# Patient Record
Sex: FEMALE | Race: WHITE | NOT HISPANIC OR LATINO | Employment: FULL TIME | URBAN - METROPOLITAN AREA
[De-identification: names, ages, dates, MRNs, and addresses within clinical notes are randomized per-mention and may not be internally consistent; named-entity substitution may affect disease eponyms.]

---

## 2017-03-29 ENCOUNTER — RADIANT APPOINTMENT (OUTPATIENT)
Dept: MAMMOGRAPHY | Facility: CLINIC | Age: 47
End: 2017-03-29
Attending: NURSE PRACTITIONER
Payer: COMMERCIAL

## 2017-03-29 DIAGNOSIS — Z12.31 VISIT FOR SCREENING MAMMOGRAM: ICD-10-CM

## 2017-03-29 PROCEDURE — G0202 SCR MAMMO BI INCL CAD: HCPCS | Performed by: RADIOLOGY

## 2017-04-14 ENCOUNTER — OFFICE VISIT (OUTPATIENT)
Dept: URGENT CARE | Facility: URGENT CARE | Age: 47
End: 2017-04-14
Payer: COMMERCIAL

## 2017-04-14 ENCOUNTER — TELEPHONE (OUTPATIENT)
Dept: FAMILY MEDICINE | Facility: CLINIC | Age: 47
End: 2017-04-14

## 2017-04-14 VITALS
TEMPERATURE: 98.2 F | HEART RATE: 77 BPM | BODY MASS INDEX: 20.52 KG/M2 | DIASTOLIC BLOOD PRESSURE: 78 MMHG | WEIGHT: 132 LBS | OXYGEN SATURATION: 99 % | SYSTOLIC BLOOD PRESSURE: 114 MMHG | RESPIRATION RATE: 15 BRPM

## 2017-04-14 DIAGNOSIS — T14.8XXA BRUISING: ICD-10-CM

## 2017-04-14 DIAGNOSIS — J30.2 SEASONAL ALLERGIC RHINITIS, UNSPECIFIED ALLERGIC RHINITIS TRIGGER: ICD-10-CM

## 2017-04-14 DIAGNOSIS — J45.31 MILD PERSISTENT ASTHMA WITH EXACERBATION: ICD-10-CM

## 2017-04-14 DIAGNOSIS — R42 VERTIGO: Primary | ICD-10-CM

## 2017-04-14 LAB
ERYTHROCYTE [DISTWIDTH] IN BLOOD BY AUTOMATED COUNT: 12.2 % (ref 10–15)
HCT VFR BLD AUTO: 40.1 % (ref 35–47)
HGB BLD-MCNC: 13.1 G/DL (ref 11.7–15.7)
MCH RBC QN AUTO: 30.6 PG (ref 26.5–33)
MCHC RBC AUTO-ENTMCNC: 32.7 G/DL (ref 31.5–36.5)
MCV RBC AUTO: 94 FL (ref 78–100)
PLATELET # BLD AUTO: 209 10E9/L (ref 150–450)
RBC # BLD AUTO: 4.28 10E12/L (ref 3.8–5.2)
WBC # BLD AUTO: 5.5 10E9/L (ref 4–11)

## 2017-04-14 PROCEDURE — 99214 OFFICE O/P EST MOD 30 MIN: CPT | Performed by: FAMILY MEDICINE

## 2017-04-14 PROCEDURE — 36415 COLL VENOUS BLD VENIPUNCTURE: CPT | Performed by: FAMILY MEDICINE

## 2017-04-14 PROCEDURE — 85027 COMPLETE CBC AUTOMATED: CPT | Performed by: FAMILY MEDICINE

## 2017-04-14 RX ORDER — FLUTICASONE PROPIONATE 50 MCG
SPRAY, SUSPENSION (ML) NASAL
Qty: 9.9 ML | Refills: 1 | Status: SHIPPED | OUTPATIENT
Start: 2017-04-14 | End: 2017-07-18

## 2017-04-14 RX ORDER — ALBUTEROL SULFATE 90 UG/1
2 AEROSOL, METERED RESPIRATORY (INHALATION) EVERY 4 HOURS PRN
Qty: 1 INHALER | Refills: 2 | Status: SHIPPED | OUTPATIENT
Start: 2017-04-14 | End: 2018-09-28

## 2017-04-14 ASSESSMENT — PAIN SCALES - GENERAL: PAINLEVEL: MILD PAIN (3)

## 2017-04-14 NOTE — TELEPHONE ENCOUNTER
RN returned call to pt.    Pt reports a family hx of migraines. Pt states her first migraine was 15-years-ago. Previously pt headaches were only associated with allergy symptom congestion. Pt states recently over the past few months, headaches have become more frequent and no longer respond to tx with Advil. Pt states she is unsure if increasing HAs are related to stress, tension, or other. Pt reports having a headache earlier today, but did Yoga to stretch out and it helped.     Pt reports hx of dizziness/vertigo with current episode lasting 6 days without complete resolution. First episode about 4-5 years ago. Hx of seizures as a child at age 1, without additional hx of this since early childhood. Pt reports hx of low BP and states she gets dizziness when dehydrated due to low BP. Pt states she is not dehydrated at this time and states the dizziness has been persistent and not only associated with position changes. Pt states last week on Thursday and Friday she developed a headache, followed by improvement on Saturday. Sunday-Wednesday of this week significant, persistent dizziness developed especially with position changes. The vertigo Sunday-Wednesday was associated with nausea with any change in position. Pt took dramamine for vertigo sx. Pt states mild dizziness persists today, but is able to eat and walk without nausea now today.    Pt reports about 1 week ago she noticed blood underneath skin on wrists. Pt reports sudden onset without injury. Pt felt pinching sensation in wrist and at that time could see active bleeding below the skin in her wrist without a break in the skin. Pt states affected area began about 1.5 inches above the wrist in the arm, and extended to the hand near thumb. Pt states the visible blood below the skin was associated with swelling locally and pain associated with the swelling in the region. Pt states the bleeding stopped without additional intervention, and bruising remained visible  in an area about 3 inches in area. Pt has had no additional episodes of spontaneous or unusual bleeding episodes since that time.    Per Telephone Triage Protocols for Nurses, Fifth Edition, Millicent Parson/Agnieszka, pt advised to be seen for evaluation for vertigo sx persisting beyond 3 days. Advised it would be appropriate to discuss the spontaneous bleeding episode and questions regarding peripheral circulation at that time as well. Advised pt schedule next available appointment with PCP as well to discuss the increasing frequency and severity of headaches no longer responding to OTC tx.   Pt states she will come to urgent care tonight regarding vertigo and bleeding, and next available appointment was scheduled on 4/26/17 with PCP. The patient/parent agrees with the plan and verbalized good understanding.  Note above reviewed with Dr. Vega and provider agrees with this plan.    Shauna Worley RN

## 2017-04-14 NOTE — MR AVS SNAPSHOT
After Visit Summary   4/14/2017    An Cornejo    MRN: [id number]           Patient Information     Date Of Birth          1970        Visit Information        Provider Department      4/14/2017 6:10 PM Ayan Nava MD [facility]        Today's Diagnoses     Vertigo    -  1    Bruising        Seasonal allergic rhinitis, unspecified allergic rhinitis trigger        Mild persistent asthma with exacerbation          Care Instructions      Benign Positional Vertigo    The inner ear is located behind the middle ear. It is a part of the balance center of the body. It contains small calcium particles within fluid filled canals (semi-circular canals). These particles can move out of position as a result of aging, head trauma or disease of the inner ear. Once that happens, movement of the head into certain positions may cause the particles to stimulate the inner ear and create the feeling of vertigo.  Vertigo is a false feeling of motion (as if you or the room is spinning). A vertigo attack may cause sudden nausea, vomiting and heavy sweating. Severe vertigo causes a loss of balance and may result in falling. During an attack of vertigo, head movement and body position changes will worsen symptoms.  An episode of vertigo may last seconds, minutes or hours. Once you are over the first episode of vertigo, it may never return. Sometimes symptoms recur off and on over several weeks or longer.  Home Care:    If symptoms are severe, rest quietly in bed. Change positions slowly. There is usually one position that will feel best, such as lying on one side or lying on your back with your head slightly raised on pillows.    Do not drive or work with dangerous machinery for one week after symptoms disappear, in case of a sudden return of symptoms.    Take medicine as prescribed to relieve your symptoms. Unless another medicine was prescribed for nausea, vomiting and vertigo, you may use  over-the-counter motion sickness pills, such as meclizine (Bonine, Bonamine, Antivert) or dimenhydrinate (Dramamine).  Follow Up  with your doctor or as directed by our staff. Report any persistent ringing in the ear or hearing loss to your doctor.  [NOTE: If you had a CT or MRI scan, it will be reviewed by a specialist. You will be notified of any new findings that may affect your care.]  Get Prompt Medical Attention  if any of the following occur:    Worsening of vertigo not controlled by the medicine prescribed    Repeated vomiting not controlled by the medicine prescribed    Increased weakness or fainting    Severe headache or unusual drowsiness or confusion    Weakness of an arm or leg or one side of the face    Difficulty with speech or vision    Seizure    9878-7687 [employer]. [address] [place] [address]. All rights reserved. This information is not intended as a substitute for professional medical care. Always follow your healthcare professional's instructions.        Labyrinthitis    The inner ear is located behind the middle ear. It is part of the balance center of your body. When the inner ear becomes irritated or inflamed it causes a condition known as labyrinthitis. It may due to a viral infection, but often a cause is not found. Labyrinthitis causes sudden dizziness and balance problems. It often causes a feeling that you or the room is spinning (vertigo). You may feel nauseated or throw up. You may also feel a loss of balance when trying to walk. Head movement from side to side or changes in body position (from lying to sitting or standing) may worsen symptoms. You may have ringing in the ear. Hearing may also be affected.  An episode of labyrinthitis may last seconds, minutes or hours. It may never return. Or symptoms may recur off and on over several weeks or longer. In many cases, the problem is short-term and goes away when the inner ear issue resolves.  Home  care    Take medicine as prescribed to relieve your symptoms. Unless another medicine was prescribed, you can try over-the-counter motion sickness pills. Note that these medicines may cause drowsiness.    If symptoms are severe, rest quietly in bed. Change positions slowly. There may be one position feels best, such as lying on one side or lying on your back with your head slightly raised on pillows.    Vestibular rehabilitation exercises involve moving the head to help resolve problems in the inner ear. If these exercises have been prescribed, do them as you have been instructed.    Do not drive or work with dangerous machinery until one week has passed without symptoms. Be cautious about using stairs.  Follow-up care  Follow up with your healthcare provider or as advised by our staff.  When to seek medical advice  Call your healthcare provider for any of the following:    Symptoms that are not controlled by medicine or that get worse    Repeated vomiting that is not relieved by medicine    Increased weakness or fainting    Headache or unusual drowsiness    Difficulty with vision or speech    Trouble moving the face, arms or legs    Hearing loss    Symptoms persist beyond a few days    1226-8067 [employer]. [address] [place] [address]. All rights reserved. This information is not intended as a substitute for professional medical care. Always follow your healthcare professional's instructions.              Follow-ups after your visit        Additional Services     PHYSICAL THERAPY REFERRAL       *This therapy referral will be filtered to a centralized scheduling office at [facility] and the patient will receive a call to schedule an appointment at a [place] location most convenient for them. *     [facility] provides Physical Therapy evaluation and treatment and many specialty services across the [place] system.  If requesting a specialty  program, please choose from the list below.    If you have not heard from the scheduling office within 2 business days, please call [phone] for all locations, with the exception of [place], please call [phone].  Treatment: Evaluation & Treatment  Special Instructions/Modalities: none   Special Programs: Balance/Vestibular    Please be aware that coverage of these services is subject to the terms and limitations of your health insurance plan.  Call member services at your health plan with any benefit or coverage questions.      **Note to Provider:  If you are referring outside of [facility] for the therapy appointment, please list the name of the location in the  special instructions  above, print the referral and give to the patient to schedule the appointment.                  Your next 10 appointments already scheduled     Apr 26, 2017  8:35 AM CDT   SHORT with Melania Vega MD   [facility] ([facility])    [address] [facility] [address] [phone]              Who to contact     If you have questions or need follow up information about today's clinic visit or your schedule please contact [facility] directly at [phone].  Normal or non-critical lab and imaging results will be communicated to you by MyChart, letter or phone within 4 business days after the clinic has received the results. If you do not hear from us within 7 days, please contact the clinic through [employer] or phone. If you have a critical or abnormal lab result, we will notify you by phone as soon as possible.  Submit refill requests through [employer] or call your pharmacy and they will forward the refill request to us. Please allow 3 business days for your refill to be completed.          Additional Information About Your Visit        [employer] Information     [employer] gives you secure access to your electronic health record. If you see a primary care provider, you can also send  messages to your care team and make appointments. If you have questions, please call your primary care clinic.  If you do not have a primary care provider, please call [phone] and they will assist you.        Care EveryWhere ID     This is your Care EveryWhere ID. This could be used by other organizations to access your [facility] medical records  [id number]        Your Vitals Were     Pulse Temperature Respirations Pulse Oximetry Breastfeeding? BMI (Body Mass Index)    77 98.2  F (36.8  C) (Oral) 15 99% No 20.52 kg/m2       Blood Pressure from Last 3 Encounters:   04/14/17 114/78   11/28/16 117/80   10/31/16 121/79    Weight from Last 3 Encounters:   04/14/17 132 lb (59.9 kg)   11/28/16 134 lb (60.8 kg)   10/31/16 136 lb 9.6 oz (62 kg)              We Performed the Following     CBC with platelets     PHYSICAL THERAPY REFERRAL          Where to get your medicines      These medications were sent to [facility]/pharmacy #[id number] - [place] [address] [facility],  AT [address], [facility] [address]     Phone:  [phone]     albuterol 108 (90 BASE) MCG/ACT Inhaler    fluticasone 50 MCG/ACT spray          Primary Care Provider Office Phone # Fax #    Melania Vega [phone]       [facility] [address] [facility] [address]        Thank you!     Thank you for choosing [facility]  for your care. Our goal is always to provide you with excellent care. Hearing back from our patients is one way we can continue to improve our services. Please take a few minutes to complete the written survey that you may receive in the mail after your visit with us. Thank you!             Your Updated Medication List - Protect others around you: Learn how to safely use, store and throw away your medicines at www.disposemymeds.org.          This list is accurate as of: 4/14/17  6:56 PM.  Always use your most recent med list.                    Brand Name Dispense Instructions for use    albuterol 108 (90 BASE) MCG/ACT Inhaler    PROAIR HFA/PROVENTIL HFA/VENTOLIN HFA    1 Inhaler    Inhale 2 puffs into the lungs every 4 hours as needed for shortness of breath / dyspnea       ALPRAZolam 0.25 MG tablet    XANAX    30 tablet    Take 1 tablet (0.25 mg) by mouth 3 times daily as needed for sleep or anxiety       cyclobenzaprine 10 MG tablet    FLEXERIL    14 tablet    Take 1 tablet (10 mg) by mouth nightly as needed for muscle spasms       dicyclomine 20 MG tablet    BENTYL    40 tablet    Take 1 tablet (20 mg) by mouth 4 times daily as needed       fluticasone 50 MCG/ACT spray    FLONASE    9.9 mL    2 sprays each nostril twice daily, for 1 week, then 2 sprays each nostril 1 time daily       MIRENA (52 MG) 20 MCG/24HR IUD   Generic drug:  levonorgestrel      1 each by Intrauterine route once Reported on 4/14/2017       senna 8.6 MG tablet    SENOKOT    60 tablet    Take 1 tablet by mouth daily

## 2017-04-14 NOTE — PATIENT INSTRUCTIONS
Benign Positional Vertigo    The inner ear is located behind the middle ear. It is a part of the balance center of the body. It contains small calcium particles within fluid filled canals (semi-circular canals). These particles can move out of position as a result of aging, head trauma or disease of the inner ear. Once that happens, movement of the head into certain positions may cause the particles to stimulate the inner ear and create the feeling of vertigo.  Vertigo is a false feeling of motion (as if you or the room is spinning). A vertigo attack may cause sudden nausea, vomiting and heavy sweating. Severe vertigo causes a loss of balance and may result in falling. During an attack of vertigo, head movement and body position changes will worsen symptoms.  An episode of vertigo may last seconds, minutes or hours. Once you are over the first episode of vertigo, it may never return. Sometimes symptoms recur off and on over several weeks or longer.  Home Care:    If symptoms are severe, rest quietly in bed. Change positions slowly. There is usually one position that will feel best, such as lying on one side or lying on your back with your head slightly raised on pillows.    Do not drive or work with dangerous machinery for one week after symptoms disappear, in case of a sudden return of symptoms.    Take medicine as prescribed to relieve your symptoms. Unless another medicine was prescribed for nausea, vomiting and vertigo, you may use over-the-counter motion sickness pills, such as meclizine (Bonine, Bonamine, Antivert) or dimenhydrinate (Dramamine).  Follow Up  with your doctor or as directed by our staff. Report any persistent ringing in the ear or hearing loss to your doctor.  [NOTE: If you had a CT or MRI scan, it will be reviewed by a specialist. You will be notified of any new findings that may affect your care.]  Get Prompt Medical Attention  if any of the following occur:    Worsening of vertigo not  controlled by the medicine prescribed    Repeated vomiting not controlled by the medicine prescribed    Increased weakness or fainting    Severe headache or unusual drowsiness or confusion    Weakness of an arm or leg or one side of the face    Difficulty with speech or vision    Seizure    0283-0154 [employer]. [address] [place] [address]. All rights reserved. This information is not intended as a substitute for professional medical care. Always follow your healthcare professional's instructions.        Labyrinthitis    The inner ear is located behind the middle ear. It is part of the balance center of your body. When the inner ear becomes irritated or inflamed it causes a condition known as labyrinthitis. It may due to a viral infection, but often a cause is not found. Labyrinthitis causes sudden dizziness and balance problems. It often causes a feeling that you or the room is spinning (vertigo). You may feel nauseated or throw up. You may also feel a loss of balance when trying to walk. Head movement from side to side or changes in body position (from lying to sitting or standing) may worsen symptoms. You may have ringing in the ear. Hearing may also be affected.  An episode of labyrinthitis may last seconds, minutes or hours. It may never return. Or symptoms may recur off and on over several weeks or longer. In many cases, the problem is short-term and goes away when the inner ear issue resolves.  Home care    Take medicine as prescribed to relieve your symptoms. Unless another medicine was prescribed, you can try over-the-counter motion sickness pills. Note that these medicines may cause drowsiness.    If symptoms are severe, rest quietly in bed. Change positions slowly. There may be one position feels best, such as lying on one side or lying on your back with your head slightly raised on pillows.    Vestibular rehabilitation exercises involve moving the head to help resolve  problems in the inner ear. If these exercises have been prescribed, do them as you have been instructed.    Do not drive or work with dangerous machinery until one week has passed without symptoms. Be cautious about using stairs.  Follow-up care  Follow up with your healthcare provider or as advised by our staff.  When to seek medical advice  Call your healthcare provider for any of the following:    Symptoms that are not controlled by medicine or that get worse    Repeated vomiting that is not relieved by medicine    Increased weakness or fainting    Headache or unusual drowsiness    Difficulty with vision or speech    Trouble moving the face, arms or legs    Hearing loss    Symptoms persist beyond a few days    1715-6951 [employer]. [address] [place] [address], [place] [address]. All rights reserved. This information is not intended as a substitute for professional medical care. Always follow your healthcare professional's instructions.

## 2017-04-14 NOTE — LETTER
[facility]  [address] [facility] [address]        April 17, 2017    An Nameny  [address] [facility] [address]            Dear An,    The results of your recent tests were normal.  Below is a copy of the results.  It was a pleasure to see you at your last appointment.    If you have any questions or concerns, please call myself or my nurse at [phone].    Sincerely,    Ayan Nava MD/ks    Results for orders placed or performed in visit on 04/14/17   CBC with platelets   Result Value Ref Range    WBC 5.5 4.0 - 11.0 10e9/L    RBC Count 4.28 3.8 - 5.2 10e12/L    Hemoglobin 13.1 11.7 - 15.7 g/dL    Hematocrit 40.1 35.0 - 47.0 %    MCV 94 78 - 100 fl    MCH 30.6 26.5 - 33.0 pg    MCHC 32.7 31.5 - 36.5 g/dL    RDW 12.2 10.0 - 15.0 %    Platelet Count 209 150 - 450 10e9/L

## 2017-04-14 NOTE — TELEPHONE ENCOUNTER
blood underneath skin on wrists, no trauma. Bruised for a few days. Went away.  Headaches and migraines for a few months. Dizzy spells, vertigo for 3 days. Patient would like a call back to discuss and if should be seen.   [phone]  Okay to leave detailed VM.

## 2017-04-14 NOTE — PROGRESS NOTES
SUBJECTIVE:                                                    An Cornejo is a 46 year old female who presents to clinic today for the following health issues:      Dizziness      Duration: 5 day    Description   Feeling faint:  no   Feeling like the surroundings are moving: YES  Loss of consciousness or falls: no     Intensity:  moderate    Accompanying signs and symptoms: patient recently c/o nasal congestion, post nasal drainage, headache and sinus pressure  Nausea/vomitting: YES  Palpitations: no   Weakness in arms or legs: no but off and on numbness in upper arms  Vision or speech changes: vision  Ringing in ears (Tinnitus): YES  Hearing loss related to dizziness: no   Other (fevers/chills/sweating/dyspnea): no     History (similar episodes/head trauma/previous evaluation/recent bleeding): None    Precipitating or alleviating factors (new meds/chemicals): None  Worse with activity/head movement: YES    Therapies tried and outcome: dramamine     She states that symptoms are improving gradually. 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  She had vertigo episodes in the past as well, last one 2 years ago. She had right wrist bruising few days ago, concern about the cause, bruising has resolved though, no trauma or injury. She is scheduled to see PCP in about 10 days.        Problem list and histories reviewed & adjusted, as indicated.  Additional history: as documented    Patient Active Problem List   Diagnosis     CARDIOVASCULAR SCREENING; LDL GOAL LESS THAN 160     Anxiety     Mild persistent asthma     Sore throat (viral)     Asthma, exercise induced     Exposure to pertussis     Rectocele     KATIE (stress urinary incontinence, female)     Papanicolaou smear of cervix with low grade squamous intraepithelial lesion (LGSIL)     Past Surgical History:   Procedure Laterality Date     BREAST LUMPECTOMY, RT/LT      left     COLPORRHAPHY POSTERIOR N/A 10/21/2014    Procedure:  COLPORRHAPHY POSTERIOR;  Surgeon: Fabian Harding MD;  [facility]     CYSTOSCOPY, SLING TRANSVAGINAL N/A 10/21/2014    Procedure: CYSTOSCOPY, SLING TRANSVAGINAL;  Surgeon: Fabian Harding MD;  [facility]     EXTRACTION(S) DENTAL       PERINEORRHAPHY N/A 10/21/2014    Procedure: PERINEORRHAPHY;  Surgeon: Fabian Harding MD;  [facility]     RECONSTRUCT BREAST BILATERAL         Social History   Substance Use Topics     Smoking status: Never Smoker     Smokeless tobacco: Never Used      Comment: nonsmoking household     Alcohol use 0.0 oz/week     0 Standard drinks or equivalent per week      Comment: occasional     Family History   Problem Relation Age of Onset     Depression Mother      Hypertension Mother      Lipids Mother      Arthritis Mother      Asthma Mother      Breast Cancer Maternal Grandmother      postmenopausal     CANCER Maternal Grandmother      Respiratory Paternal Grandfather      emphysema     Hypertension Father      CANCER Sister      Thyroid     Neurologic Disorder Sister      MS         Current Outpatient Prescriptions   Medication Sig Dispense Refill     fluticasone (FLONASE) 50 MCG/ACT spray 2 sprays each nostril twice daily, for 1 week, then 2 sprays each nostril 1 time daily 9.9 mL 1     albuterol (PROAIR HFA/PROVENTIL HFA/VENTOLIN HFA) 108 (90 BASE) MCG/ACT Inhaler Inhale 2 puffs into the lungs every 4 hours as needed for shortness of breath / dyspnea 1 Inhaler 2     ALPRAZolam (XANAX) 0.25 MG tablet Take 1 tablet (0.25 mg) by mouth 3 times daily as needed for sleep or anxiety 30 tablet 0     cyclobenzaprine (FLEXERIL) 10 MG tablet Take 1 tablet (10 mg) by mouth nightly as needed for muscle spasms 14 tablet 1     dicyclomine (BENTYL) 20 MG tablet Take 1 tablet (20 mg) by mouth 4 times daily as needed (Patient not taking: Reported on 4/14/2017) 40 tablet 1     senna (SENOKOT) 8.6 MG tablet Take 1 tablet by mouth daily (Patient not taking: Reported on 4/14/2017) 60  tablet 1     [DISCONTINUED] albuterol (PROAIR HFA, PROVENTIL HFA, VENTOLIN HFA) 108 (90 BASE) MCG/ACT inhaler Inhale 2 puffs into the lungs every 4 hours as needed for shortness of breath / dyspnea (Patient not taking: Reported on 4/14/2017) 1 Inhaler 2     levonorgestrel (MIRENA) 20 MCG/24HR IUD 1 each by Intrauterine route once Reported on 4/14/2017       Allergies   Allergen Reactions     Pain Relieving      Unable to urinate/have BM     Hydrocodone      Recent Labs   Lab Test  11/11/16   0739  10/28/16   1735  10/13/14   1532  12/18/12   1231  12/06/12   1141   LDL  114*   --   105   --   103   HDL  75   --   97   --   75   TRIG  52   --   45   --   141   TSH   --   3.69   --   1.93   --       BP Readings from Last 3 Encounters:   04/14/17 114/78   11/28/16 117/80   10/31/16 121/79    Wt Readings from Last 3 Encounters:   04/14/17 132 lb (59.9 kg)   11/28/16 134 lb (60.8 kg)   10/31/16 136 lb 9.6 oz (62 kg)                  Labs reviewed in EPIC    ROS:  Constitutional, HEENT, cardiovascular, pulmonary, gi and gu systems are negative, except as otherwise noted.    OBJECTIVE:                                                    /78  Pulse 77  Temp 98.2  F (36.8  C) (Oral)  Resp 15  Wt 132 lb (59.9 kg)  SpO2 99%  Breastfeeding? No  BMI 20.52 kg/m2  Body mass index is 20.52 kg/(m^2).  GENERAL: healthy, alert and no distress  EYES: Eyes grossly normal to inspection, PERRL and conjunctivae and sclerae normal, EOM intact   HENT: ear canals and TM's normal, nose and mouth without ulcers or lesions, Hallpike maneuver negative   NECK: no adenopathy, no asymmetry, masses, or scars and thyroid normal to palpation  RESP: lungs clear to auscultation - no rales, rhonchi or wheezes  CV: regular rate and rhythm, normal S1 S2, no S3 or S4, no murmur, click or rub, no peripheral edema and peripheral pulses strong  ABDOMEN: soft, nontender, no hepatosplenomegaly, no masses and bowel sounds normal  MS: no gross  musculoskeletal defects noted, no edema  SKIN: no suspicious lesions or rashes  NEURO: Normal strength and tone, mentation intact and speech normal, CN II-XII intact,   LYMPH: no cervical, supraclavicular, axillary, or inguinal adenopathy     ASSESSMENT/PLAN:                                                          ICD-10-CM    1. Vertigo R42 PHYSICAL THERAPY REFERRAL     CBC with platelets   2. Bruising T14.8    3. Seasonal allergic rhinitis, unspecified allergic rhinitis trigger J30.2 fluticasone (FLONASE) 50 MCG/ACT spray   4. Mild persistent asthma with exacerbation J45.31 albuterol (PROAIR HFA/PROVENTIL HFA/VENTOLIN HFA) 108 (90 BASE) MCG/ACT Inhaler       Discussed in detail differentials and further management. Differentials including but not limited to BPV and labyrinthitis. Vitals are normal and physical exam unremarkable. Reassurance provided. Suggested vestibular exercises, physical therapy order placed. Patient would like her platelets checked, CBC with platelets ordered. Albuterol inhaler and Flonase nasal spray refilled. Written instructions/information provided. Patient understood and in agreement with the above plan. All questions are answered. Patient has an appointment with PCP in about 10 days. Suggested to follow up earlier if needed.         Patient Instructions     Benign Positional Vertigo    The inner ear is located behind the middle ear. It is a part of the balance center of the body. It contains small calcium particles within fluid filled canals (semi-circular canals). These particles can move out of position as a result of aging, head trauma or disease of the inner ear. Once that happens, movement of the head into certain positions may cause the particles to stimulate the inner ear and create the feeling of vertigo.  Vertigo is a false feeling of motion (as if you or the room is spinning). A vertigo attack may cause sudden nausea, vomiting and heavy sweating. Severe vertigo causes a loss  of balance and may result in falling. During an attack of vertigo, head movement and body position changes will worsen symptoms.  An episode of vertigo may last seconds, minutes or hours. Once you are over the first episode of vertigo, it may never return. Sometimes symptoms recur off and on over several weeks or longer.  Home Care:    If symptoms are severe, rest quietly in bed. Change positions slowly. There is usually one position that will feel best, such as lying on one side or lying on your back with your head slightly raised on pillows.    Do not drive or work with dangerous machinery for one week after symptoms disappear, in case of a sudden return of symptoms.    Take medicine as prescribed to relieve your symptoms. Unless another medicine was prescribed for nausea, vomiting and vertigo, you may use over-the-counter motion sickness pills, such as meclizine (Bonine, Bonamine, Antivert) or dimenhydrinate (Dramamine).  Follow Up  with your doctor or as directed by our staff. Report any persistent ringing in the ear or hearing loss to your doctor.  [NOTE: If you had a CT or MRI scan, it will be reviewed by a specialist. You will be notified of any new findings that may affect your care.]  Get Prompt Medical Attention  if any of the following occur:    Worsening of vertigo not controlled by the medicine prescribed    Repeated vomiting not controlled by the medicine prescribed    Increased weakness or fainting    Severe headache or unusual drowsiness or confusion    Weakness of an arm or leg or one side of the face    Difficulty with speech or vision    Seizure    0288-2261 [employer]. [address] [place] [address], [place] [address]. All rights reserved. This information is not intended as a substitute for professional medical care. Always follow your healthcare professional's instructions.        Labyrinthitis    The inner ear is located behind the middle ear. It is part of the balance center of your  body. When the inner ear becomes irritated or inflamed it causes a condition known as labyrinthitis. It may due to a viral infection, but often a cause is not found. Labyrinthitis causes sudden dizziness and balance problems. It often causes a feeling that you or the room is spinning (vertigo). You may feel nauseated or throw up. You may also feel a loss of balance when trying to walk. Head movement from side to side or changes in body position (from lying to sitting or standing) may worsen symptoms. You may have ringing in the ear. Hearing may also be affected.  An episode of labyrinthitis may last seconds, minutes or hours. It may never return. Or symptoms may recur off and on over several weeks or longer. In many cases, the problem is short-term and goes away when the inner ear issue resolves.  Home care    Take medicine as prescribed to relieve your symptoms. Unless another medicine was prescribed, you can try over-the-counter motion sickness pills. Note that these medicines may cause drowsiness.    If symptoms are severe, rest quietly in bed. Change positions slowly. There may be one position feels best, such as lying on one side or lying on your back with your head slightly raised on pillows.    Vestibular rehabilitation exercises involve moving the head to help resolve problems in the inner ear. If these exercises have been prescribed, do them as you have been instructed.    Do not drive or work with dangerous machinery until one week has passed without symptoms. Be cautious about using stairs.  Follow-up care  Follow up with your healthcare provider or as advised by our staff.  When to seek medical advice  Call your healthcare provider for any of the following:    Symptoms that are not controlled by medicine or that get worse    Repeated vomiting that is not relieved by medicine    Increased weakness or fainting    Headache or unusual drowsiness    Difficulty with vision or speech    Trouble moving the face,  arms or legs    Hearing loss    Symptoms persist beyond a few days    7609-6514 [employer]. [address] [place] [address], [place] [address]. All rights reserved. This information is not intended as a substitute for professional medical care. Always follow your healthcare professional's instructions.            Ayan Nava MD  [facility]

## 2017-04-19 NOTE — PROGRESS NOTES
SUBJECTIVE:                                                    An Cornejo is a 46 year old female who presents to clinic today for the following health issues:      Headaches      Duration: ongoing     Description  Location: entire head and down into neck    Character: throbbing pain, dull pain, sharp pain  Frequency:  Feels like she has a constant headache all the time   Duration:  All day     Intensity:  moderate    Accompanying signs and symptoms:    Precipitating or Alleviating factors:  Nausea/vomiting: nausea   Dizziness: with last headache lasting 3 days   Weakness or numbness: weakness , numbness in arms and legs   Visual changes: with the vertigo cloudness on right side   Fever: no   Sinus or URI symptoms YES- sinus     History  Head trauma: no  Family history of migraines: no   Previous tests for headaches: no   Neurologist evaluations: no , when she was 1 years old was on seizure medication  Able to do daily activities when headache present: YES- sometimes   Wake with headaches: YES  Daily pain medication use: YES  Any changes in: family , work  and school going to grad school     Precipitating or Alleviating factors (light/sound/sleep/caffeine): unsure , does get nausea with caffeine     Therapies tried and outcome: dyquil , Ibuprofen (Advil, Motrin) and Tylenol    Outcome - not effective did take a flexeril which was somewhat effective   Frequent/daily pain medication use: no        Bleeding under right wrist, felt pop/pain in wrist then saw bruise develop under skin of dorsal right wrist.  Full resolved.   Vaginal odor - no discharge, no itch. Odor seems to come and go.     Having different HA  - Migraines - +photophobia/phonophobia, severe pain behind right eye to back of head down neck to upper back. + nausea Has to dark room, sleep. Uses OTC analgesics.  Happening 1-2 times per month over the past 6 months, previous would years between migraines.     - near daily HA - pain is in same  distribution but not as severe, no other symptoms as above.      Problem list and histories reviewed & adjusted, as indicated.  Additional history: as documented    Patient Active Problem List   Diagnosis     CARDIOVASCULAR SCREENING; LDL GOAL LESS THAN 160     Anxiety     Mild persistent asthma     Sore throat (viral)     Asthma, exercise induced     Exposure to pertussis     Rectocele     KATIE (stress urinary incontinence, female)     Papanicolaou smear of cervix with low grade squamous intraepithelial lesion (LGSIL)     Past Surgical History:   Procedure Laterality Date     BREAST LUMPECTOMY, RT/LT      left     COLPORRHAPHY POSTERIOR N/A 10/21/2014    Procedure: COLPORRHAPHY POSTERIOR;  Surgeon: Fabian Harding MD;  [facility]     CYSTOSCOPY, SLING TRANSVAGINAL N/A 10/21/2014    Procedure: CYSTOSCOPY, SLING TRANSVAGINAL;  Surgeon: Fabian Harding MD;  [facility]     EXTRACTION(S) DENTAL       PERINEORRHAPHY N/A 10/21/2014    Procedure: PERINEORRHAPHY;  Surgeon: Fabian Harding MD;  [facility]     RECONSTRUCT BREAST BILATERAL         Social History   Substance Use Topics     Smoking status: Never Smoker     Smokeless tobacco: Never Used      Comment: nonsmoking household     Alcohol use 0.0 oz/week     0 Standard drinks or equivalent per week      Comment: occasional     Family History   Problem Relation Age of Onset     Depression Mother      Hypertension Mother      Lipids Mother      Arthritis Mother      Asthma Mother      Breast Cancer Maternal Grandmother      postmenopausal     CANCER Maternal Grandmother      Respiratory Paternal Grandfather      emphysema     Hypertension Father      CANCER Sister      Thyroid     Neurologic Disorder Sister      MS         Current Outpatient Prescriptions   Medication Sig Dispense Refill     SUMAtriptan (IMITREX) 25 MG tablet Take 1-2 tablets (25-50 mg) by mouth at onset of headache for migraine May repeat in 2 hours. Max 8 tablets/24 hours. 9  "tablet 1     metroNIDAZOLE (FLAGYL) 500 MG tablet Take 1 tablet (500 mg) by mouth 2 times daily 14 tablet 0     fluticasone (FLONASE) 50 MCG/ACT spray 2 sprays each nostril twice daily, for 1 week, then 2 sprays each nostril 1 time daily 9.9 mL 1     albuterol (PROAIR HFA/PROVENTIL HFA/VENTOLIN HFA) 108 (90 BASE) MCG/ACT Inhaler Inhale 2 puffs into the lungs every 4 hours as needed for shortness of breath / dyspnea 1 Inhaler 2     dicyclomine (BENTYL) 20 MG tablet Take 1 tablet (20 mg) by mouth 4 times daily as needed 40 tablet 1     ALPRAZolam (XANAX) 0.25 MG tablet Take 1 tablet (0.25 mg) by mouth 3 times daily as needed for sleep or anxiety 30 tablet 0     senna (SENOKOT) 8.6 MG tablet Take 1 tablet by mouth daily 60 tablet 1     cyclobenzaprine (FLEXERIL) 10 MG tablet Take 1 tablet (10 mg) by mouth nightly as needed for muscle spasms 14 tablet 1     levonorgestrel (MIRENA) 20 MCG/24HR IUD 1 each by Intrauterine route once Reported on 4/14/2017       BP Readings from Last 3 Encounters:   04/26/17 110/70   04/14/17 114/78   11/28/16 117/80    Wt Readings from Last 3 Encounters:   04/26/17 135 lb (61.2 kg)   04/14/17 132 lb (59.9 kg)   11/28/16 134 lb (60.8 kg)                  Labs reviewed in EPIC    Reviewed and updated as needed this visit by clinical staff  Tobacco  Allergies  Meds  Problems  Med Hx  Surg Hx  Fam Hx  Soc Hx        Reviewed and updated as needed this visit by Provider  Allergies  Meds  Problems         ROS:  Constitutional, HEENT, cardiovascular, pulmonary, gi and gu systems are negative, except as otherwise noted.    OBJECTIVE:                                                    /70  Pulse 78  Temp 97.1  F (36.2  C) (Oral)  Ht 5' 7.25\" (1.708 m)  Wt 135 lb (61.2 kg)  BMI 20.99 kg/m2  Body mass index is 20.99 kg/(m^2).  GENERAL: healthy, alert and no distress  EYES: Eyes grossly normal to inspection, PERRL and conjunctivae and sclerae normal  HENT: ear canals and TM's " normal, nose and mouth without ulcers or lesions  NECK: no adenopathy, no asymmetry, masses, or scars and thyroid normal to palpation  MS: no gross musculoskeletal defects noted, no edema  SKIN: no suspicious lesions or rashes  NEURO: Normal strength and tone, sensory exam grossly normal, mentation intact and cranial nerves 2-12 intact  PSYCH: mentation appears normal, affect normal/bright    Diagnostic Test Results:  Wet prep positive for clue cells     ASSESSMENT/PLAN:                                                    (R51) Chronic daily headache  (primary encounter diagnosis)  (G43.009) Migraine without aura and without status migrainosus, not intractable  Comment: mixed picture  Plan: [facility] CHIR[facility]RACTIC REFERRAL,         SUMAtriptan (IMITREX) 25 MG tablet        Trial of imitrex for migraines, refer to PT for massage  Continue chiro for ha treatment    (N76.0,  B96.89) BV (bacterial vaginosis)  (N89.8) Vaginal odor  Comment: pos wet prep  Plan: metroNIDAZOLE (FLAGYL) 500 MG tablet,         DISCONTINUED: metroNIDAZOLE (FLAGYL ER) 750 MG         TB24        Wet prep        Treat with flagyl      See Patient Instructions    Melania Vega MD  [facility]

## 2017-04-26 ENCOUNTER — OFFICE VISIT (OUTPATIENT)
Dept: FAMILY MEDICINE | Facility: CLINIC | Age: 47
End: 2017-04-26
Payer: COMMERCIAL

## 2017-04-26 VITALS
SYSTOLIC BLOOD PRESSURE: 110 MMHG | WEIGHT: 135 LBS | HEART RATE: 78 BPM | DIASTOLIC BLOOD PRESSURE: 70 MMHG | BODY MASS INDEX: 21.19 KG/M2 | TEMPERATURE: 97.1 F | HEIGHT: 67 IN

## 2017-04-26 DIAGNOSIS — N76.0 BV (BACTERIAL VAGINOSIS): ICD-10-CM

## 2017-04-26 DIAGNOSIS — B96.89 BV (BACTERIAL VAGINOSIS): ICD-10-CM

## 2017-04-26 DIAGNOSIS — G43.009 MIGRAINE WITHOUT AURA AND WITHOUT STATUS MIGRAINOSUS, NOT INTRACTABLE: ICD-10-CM

## 2017-04-26 DIAGNOSIS — R51.9 CHRONIC DAILY HEADACHE: Primary | ICD-10-CM

## 2017-04-26 DIAGNOSIS — N89.8 VAGINAL ODOR: ICD-10-CM

## 2017-04-26 LAB
MICRO REPORT STATUS: ABNORMAL
SPECIMEN SOURCE: ABNORMAL
WET PREP SPEC: ABNORMAL

## 2017-04-26 PROCEDURE — 99214 OFFICE O/P EST MOD 30 MIN: CPT | Performed by: FAMILY MEDICINE

## 2017-04-26 PROCEDURE — 87210 SMEAR WET MOUNT SALINE/INK: CPT | Performed by: FAMILY MEDICINE

## 2017-04-26 RX ORDER — SUMATRIPTAN 25 MG/1
25-50 TABLET, FILM COATED ORAL
Qty: 9 TABLET | Refills: 1 | Status: SHIPPED | OUTPATIENT
Start: 2017-04-26 | End: 2017-05-18 | Stop reason: SINTOL

## 2017-04-26 RX ORDER — METRONIDAZOLE 500 MG/1
500 TABLET ORAL 2 TIMES DAILY
Qty: 14 TABLET | Refills: 0 | Status: SHIPPED | OUTPATIENT
Start: 2017-04-26 | End: 2017-06-14

## 2017-04-26 NOTE — MR AVS SNAPSHOT
After Visit Summary   4/26/2017    An Cornejo    MRN: [id number]           Patient Information     Date Of Birth          1970        Visit Information        Provider Department      4/26/2017 8:35 AM Melania Vega MD [facility]        Today's Diagnoses     Chronic daily headache    -  1    Migraine without aura and without status migrainosus, not intractable        Vaginal odor          Care Instructions    Start with PT for massage      See your chiropractor      Could consider Elavil for prevention of daily ha, take at night but may have hangover effect in AM        Follow-ups after your visit        Additional Services     CAROL ANN PT, HAND, AND CHIROPRACTIC REFERRAL       **This order will print in the [place] [facility] Scheduling Office**    Physical Therapy, Hand Therapy and Chiropractic Care are available through:    *[facility]  *[facility]  *[facility]    Call one number to schedule at any of the above locations: [phone].    Your provider has referred you to: Physical Therapy at [place] [facility] or [facility]    Indication/Reason for Referral: Headaches/neck/upper back pain  Onset of Illness: 6 months  Therapy Orders: Evaluate and Treat  Special Programs: None  Special Request: Manual Therapy: Myofascial Release/Massage  None    Micheal Burton      Additional Comments for the Therapist or Chiropractor:     Please be aware that coverage of these services is subject to the terms and limitations of your health insurance plan.  Call member services at your health plan with any benefit or coverage questions.      Please bring the following to your appointment:    *Your personal calendar for scheduling future appointments  *Comfortable clothing                  Who to contact     If you have questions or need follow up information about today's clinic visit or your schedule please contact [facility] directly at  "[phone].  Normal or non-critical lab and imaging results will be communicated to you by MyChart, letter or phone within 4 business days after the clinic has received the results. If you do not hear from us within 7 days, please contact the clinic through [employer] or phone. If you have a critical or abnormal lab result, we will notify you by phone as soon as possible.  Submit refill requests through [employer] or call your pharmacy and they will forward the refill request to us. Please allow 3 business days for your refill to be completed.          Additional Information About Your Visit        [employer] Information     [employer] gives you secure access to your electronic health record. If you see a primary care provider, you can also send messages to your care team and make appointments. If you have questions, please call your primary care clinic.  If you do not have a primary care provider, please call [phone] and they will assist you.        Care EveryWhere ID     This is your Care EveryWhere ID. This could be used by other organizations to access your [facility] medical records  [id number]        Your Vitals Were     Pulse Temperature Height BMI (Body Mass Index)          78 97.1  F (36.2  C) (Oral) 5' 7.25\" (1.708 m) 20.99 kg/m2         Blood Pressure from Last 3 Encounters:   04/26/17 110/70   04/14/17 114/78   11/28/16 117/80    Weight from Last 3 Encounters:   04/26/17 135 lb (61.2 kg)   04/14/17 132 lb (59.9 kg)   11/28/16 134 lb (60.8 kg)              We Performed the Following     CAROL ANN PT, HAND, AND CHIROPRACTIC REFERRAL     Wet prep          Today's Medication Changes          These changes are accurate as of: 4/26/17  9:10 AM.  If you have any questions, ask your nurse or doctor.               Start taking these medicines.        Dose/Directions    SUMAtriptan 25 MG tablet   Commonly known as:  IMITREX   Used for:  Migraine without aura and without status migrainosus, not intractable   Started by:  " Melania Vega MD        Dose:  25-50 mg   Take 1-2 tablets (25-50 mg) by mouth at onset of headache for migraine May repeat in 2 hours. Max 8 tablets/24 hours.   Quantity:  9 tablet   Refills:  1            Where to get your medicines      These medications were sent to [facility]/pharmacy #[id number] - [place], [place] - [address],  [address]  [address], , [facility] [address]     Phone:  [phone]     SUMAtriptan 25 MG tablet                Primary Care Provider Office Phone # Fax #    Melania Vega [phone]       [facility] [address] [address]        Thank you!     Thank you for choosing [facility]  for your care. Our goal is always to provide you with excellent care. Hearing back from our patients is one way we can continue to improve our services. Please take a few minutes to complete the written survey that you may receive in the mail after your visit with us. Thank you!             Your Updated Medication List - Protect others around you: Learn how to safely use, store and throw away your medicines at www.disposemymeds.org.          This list is accurate as of: 4/26/17  9:10 AM.  Always use your most recent med list.                   Brand Name Dispense Instructions for use    albuterol 108 (90 BASE) MCG/ACT Inhaler    PROAIR HFA/PROVENTIL HFA/VENTOLIN HFA    1 Inhaler    Inhale 2 puffs into the lungs every 4 hours as needed for shortness of breath / dyspnea       ALPRAZolam 0.25 MG tablet    XANAX    30 tablet    Take 1 tablet (0.25 mg) by mouth 3 times daily as needed for sleep or anxiety       cyclobenzaprine 10 MG tablet    FLEXERIL    14 tablet    Take 1 tablet (10 mg) by mouth nightly as needed for muscle spasms       dicyclomine 20 MG tablet    BENTYL    40 tablet    Take 1 tablet (20 mg) by mouth 4 times daily as needed       fluticasone 50 MCG/ACT spray    FLONASE    9.9 mL    2 sprays  each nostril twice daily, for 1 week, then 2 sprays each nostril 1 time daily       MIRENA (52 MG) 20 MCG/24HR IUD   Generic drug:  levonorgestrel      1 each by Intrauterine route once Reported on 4/14/2017       senna 8.6 MG tablet    SENOKOT    60 tablet    Take 1 tablet by mouth daily       SUMAtriptan 25 MG tablet    IMITREX    9 tablet    Take 1-2 tablets (25-50 mg) by mouth at onset of headache for migraine May repeat in 2 hours. Max 8 tablets/24 hours.

## 2017-04-26 NOTE — PATIENT INSTRUCTIONS
Start with PT for massage      See your chiropractor      Could consider Elavil for prevention of daily ha, take at night but may have hangover effect in AM

## 2017-04-26 NOTE — NURSING NOTE
"Chief Complaint   Patient presents with     Headache       Initial /70  Pulse 78  Temp 97.1  F (36.2  C) (Oral)  Ht 5' 7.25\" (1.708 m)  Wt 135 lb (61.2 kg)  BMI 20.99 kg/m2 Estimated body mass index is 20.99 kg/(m^2) as calculated from the following:    Height as of this encounter: 5' 7.25\" (1.708 m).    Weight as of this encounter: 135 lb (61.2 kg).  Medication Reconciliation: complete  Belle Goldsmith , EDELMIRA     "

## 2017-04-27 ASSESSMENT — ASTHMA QUESTIONNAIRES: ACT_TOTALSCORE: 23

## 2017-05-01 ENCOUNTER — THERAPY VISIT (OUTPATIENT)
Dept: PHYSICAL THERAPY | Facility: CLINIC | Age: 47
End: 2017-05-01
Payer: COMMERCIAL

## 2017-05-01 DIAGNOSIS — R29.3 POOR POSTURE: ICD-10-CM

## 2017-05-01 DIAGNOSIS — M54.2 NECK PAIN: ICD-10-CM

## 2017-05-01 DIAGNOSIS — G44.219 EPISODIC TENSION-TYPE HEADACHE, NOT INTRACTABLE: Primary | ICD-10-CM

## 2017-05-01 DIAGNOSIS — M54.9 UPPER BACK PAIN: ICD-10-CM

## 2017-05-01 PROCEDURE — 97140 MANUAL THERAPY 1/> REGIONS: CPT | Mod: GP | Performed by: PHYSICAL THERAPIST

## 2017-05-01 PROCEDURE — 97110 THERAPEUTIC EXERCISES: CPT | Mod: GP | Performed by: PHYSICAL THERAPIST

## 2017-05-01 PROCEDURE — 97112 NEUROMUSCULAR REEDUCATION: CPT | Mod: GP | Performed by: PHYSICAL THERAPIST

## 2017-05-01 PROCEDURE — 97161 PT EVAL LOW COMPLEX 20 MIN: CPT | Mod: GP | Performed by: PHYSICAL THERAPIST

## 2017-05-01 NOTE — MR AVS SNAPSHOT
After Visit Summary   5/1/2017    An Cornejo    MRN: [id number]           Patient Information     Date Of Birth          1970        Visit Information        Provider Department      5/1/2017 7:40 AM Aracelis Ojeda, PT [facility] Physical Therapy        Today's Diagnoses     Episodic tension-type headache, not intractable    -  1    Neck pain        Upper back pain        Poor posture           Follow-ups after your visit        Your next 10 appointments already scheduled     May 08, 2017  5:50 PM CDT   CAROL ANN Spine with Renaldo Servin, PT   [facility] Physical Therapy ([facility]  )    [address]  [facility] [address]   [phone]            May 18, 2017  1:30 PM CDT   CAROL ANN Spine with Renaldo Servin, PT   [facility] Physical Therapy ([facility]  )    [address]  [facility] [id number] [phone]              Who to contact     If you have questions or need follow up information about today's clinic visit or your schedule please contact [facility] PHYSICAL THERAPY directly at [phone].  Normal or non-critical lab and imaging results will be communicated to you by [employer], letter or phone within 4 business days after the clinic has received the results. If you do not hear from us within 7 days, please contact the clinic through [employer] or phone. If you have a critical or abnormal lab result, we will notify you by phone as soon as possible.  Submit refill requests through [employer] or call your pharmacy and they will forward the refill request to us. Please allow 3 business days for your refill to be completed.          Additional Information About Your Visit        [employer] Information     [employer] gives you secure access to your electronic health record. If you see a primary care provider, you can also send messages to  your care team and make appointments. If you have questions, please call your primary care clinic.  If you do not have a primary care provider, please call [phone] and they will assist you.        Care EveryWhere ID     This is your Care EveryWhere ID. This could be used by other organizations to access your [facility] medical records  [id number]         Blood Pressure from Last 3 Encounters:   04/26/17 110/70   04/14/17 114/78   11/28/16 117/80    Weight from Last 3 Encounters:   04/26/17 61.2 kg (135 lb)   04/14/17 59.9 kg (132 lb)   11/28/16 60.8 kg (134 lb)              We Performed the Following     HC PT EVAL, LOW COMPLEXITY     CAROL ANN INITIAL EVAL REPORT     MANUAL THER TECH,1+REGIONS,EA 15 MIN     NEUROMUSCULAR RE-EDUCATION     THERAPEUTIC EXERCISES        Primary Care Provider Office Phone # Fax #    Melania Vega [phone]       [address] [address]        Thank you!     Thank you for [facility] PHYSICAL THERAP[facility]  for your care. Our goal is always to provide you with excellent care. Hearing back from our patients is one way we can continue to improve our services. Please take a few minutes to complete the written survey that you may receive in the mail after your visit with us. Thank you!             Your Updated Medication List - Protect others around you: Learn how to safely use, store and throw away your medicines at www.disposemymeds.org.          This list is accurate as of: 5/1/17  1:23 PM.  Always use your most recent med list.                   Brand Name Dispense Instructions for use    albuterol 108 (90 BASE) MCG/ACT Inhaler    PROAIR HFA/PROVENTIL HFA/VENTOLIN HFA    1 Inhaler    Inhale 2 puffs into the lungs every 4 hours as needed for shortness of breath / dyspnea       ALPRAZolam 0.25 MG tablet    XANAX    30 tablet    Take 1 tablet (0.25 mg) by mouth 3 times daily as needed for  sleep or anxiety       cyclobenzaprine 10 MG tablet    FLEXERIL    14 tablet    Take 1 tablet (10 mg) by mouth nightly as needed for muscle spasms       dicyclomine 20 MG tablet    BENTYL    40 tablet    Take 1 tablet (20 mg) by mouth 4 times daily as needed       fluticasone 50 MCG/ACT spray    FLONASE    9.9 mL    2 sprays each nostril twice daily, for 1 week, then 2 sprays each nostril 1 time daily       metroNIDAZOLE 500 MG tablet    FLAGYL    14 tablet    Take 1 tablet (500 mg) by mouth 2 times daily       MIRENA (52 MG) 20 MCG/24HR IUD   Generic drug:  levonorgestrel      1 each by Intrauterine route once Reported on 4/14/2017       senna 8.6 MG tablet    SENOKOT    60 tablet    Take 1 tablet by mouth daily       SUMAtriptan 25 MG tablet    IMITREX    9 tablet    Take 1-2 tablets (25-50 mg) by mouth at onset of headache for migraine May repeat in 2 hours. Max 8 tablets/24 hours.

## 2017-05-01 NOTE — LETTER
[facility] PH[facility]ICAL [facility]  [address]  [facility] [id number] [phone]    May 1, 2017    Re: An Cornejo   :   1970  MRN:  [id number]   REFERRING PHYSICIAN:   Melania Vega    [facility]    Date of Initial Evaluation:  ***  Visits:  Rxs Used: 1  Reason for Referral:     Episodic tension-type headache, not intractable  Neck pain  Upper back pain  Poor posture    EVALUATION SUMMARY    Subjective:    Patient is a 46 year old female presenting with rehab cervical spine hpi.   An Cornejo is a 46 year old female with a cervical spine condition.  Condition occurred with:  Repetition/overuse and insidious onset.  Condition occurred: for unknown reasons.  This is a chronic and recurrent condition  MD order 17. An has been having headache, neck pain, upper back pain for the past 15 years. Last 3-4 months it has gotten worse with increased pain in her upper back, constant headache and nausea. Few weeks ago she was very dizzy and had symptoms of vertigo and was in bed for 3 days, went to ER and was given medication. She met with PCP and was referred to PT for further management..    Patient reports pain:  Cervical right side.  Radiates to:  Head, face, shoulder right and shoulder left.  Pain is described as aching and is constant and reported as 3/10.  Associated symptoms:  Dizziness, headache, tingling, numbness, loss of strength and fatigue. Pain is worse during the day.  Symptoms are exacerbated by rotating head, change of position and looking up or down and relieved by rest and muscle relaxants.  Since onset symptoms are gradually worsening.        General health as reported by patient is fair.  Pertinent medical history includes:  Seizures, depression and asthma (Dizziness, changes in skin color).      Current medications:  Muscle relaxants and pain medication.  Current occupation is Writer.                                     Objective:    Standing Alignment:    Cervical/Thoracic:  Forward head and thoracic kyphosis increased  Shoulder/UE:  Rounded shoulders, protracted scapula L and protracted scapula R                  Flexibility/Screens:     Upper Extremity:    Decreased left upper extremity flexibility at:  Pectoralis Major and Pectoralis Minor    Decreased right upper extremity flexibility present at:  Pectoralis Major and Pectoralis Minor    Spine:  Decreased left spine flexibility:  Upper Trap    Decreased right spine flexibility:  Upper Trap                  Cervical/Thoracic Evaluation  Cervical AROM: normal (R rot (+), R SB(+))         Headaches: cervical and migraine  Cervical Myotomes:  normal                  DTR's:  normal          Cervical Dermatomes:  normal                    Cervical Palpation:    Tenderness present at Left:    Rhomboids; Upper Trap; Levator and Erector Spinae  Tenderness present at Right:    Rhomboids; Upper Trap; Levator; Erector Spinae and Suboccipitals        Cord Sign:  normal                                            Vero Cervical Evaluation    Posture:  Sitting: poor  Standing: fair  Protruding Head: yes  Wry Neck: no  Correction of Posture: better    Movement Loss:          Lateral Flexion Right (LF R): min and pain    Rotation Right (ROT R): min and pain      Static Tests:      Retraction: better    Conclusion: posture and dysfunction  Principle of Treatment:  Posture Correction: Sitting, standing, working demonstrated, educated and practied, Advised to get lumbar support to help with posture                                                 ROS    Assessment/Plan:      Patient is a 46 year old female with cervical complaints.    Patient has the following significant findings with corresponding treatment plan.                Diagnosis 1:  Headache, Neck pain, Upper back pain, Poor posture  Pain -  hot/cold therapy, US, electric stimulation, manual therapy,  STS, splint/taping/bracing/orthotics, self management, education and home program  Decreased strength - therapeutic exercise, therapeutic activities and home program  Decreased function - therapeutic activities and home program  Impaired posture - neuro re-education, therapeutic activities and home program    Therapy Evaluation Codes:   1) History comprised of:   Personal factors that impact the plan of care:      Past/current experiences and Time since onset of symptoms.    Comorbidity factors that impact the plan of care are:      Asthma, Depression, Dizziness, Migraines/headaches, Numbness/tingling, Pain at night/rest, Seizures and changes in skin color.     Medications impacting care: Muscle relaxant and Pain.  2) Examination of Body Systems comprised of:   Body structures and functions that impact the plan of care:      Cervical spine.   Activity limitations that impact the plan of care are:      Driving, Reading/Computer work, Sitting and Sleeping.  3) Clinical presentation characteristics are:   Stable/Uncomplicated.  4) Decision-Making    Low complexity using standardized patient assessment instrument and/or measureable assessment of functional outcome.  Cumulative Therapy Evaluation is: Low complexity.    Previous and current functional limitations:  (See Goal Flow Sheet for this information)    Short term and Long term goals: (See Goal Flow Sheet for this information)     Communication ability:  Patient appears to be able to clearly communicate and understand verbal and written communication and follow directions correctly.  Treatment Explanation - The following has been discussed with the patient:   RX ordered/plan of care  Anticipated outcomes  Possible risks and side effects  This patient would benefit from PT intervention to resume normal activities.   Rehab potential is good.    Frequency:  1 X week, once daily  Duration:  for 6 weeks  Discharge Plan:  Achieve all LTG.  Independent in home treatment  program.  Reach maximal therapeutic benefit.    Please refer to the daily flowsheet for treatment today, total treatment time and time spent performing 1:1 timed codes.             Thank you for your referral.    INQUIRIES  Therapist:  [facility]  [address]  [facility] [address]  Phone: [phone]  Fax: [phone]

## 2017-05-01 NOTE — PROGRESS NOTES
Subjective:    Patient is a 46 year old female presenting with rehab cervical spine hpi.   An Cornejo is a 46 year old female with a cervical spine condition.  Condition occurred with:  Repetition/overuse and insidious onset.  Condition occurred: for unknown reasons.  This is a chronic and recurrent condition  MD wade 4/26/17. An has been having headache, neck pain, upper back pain for the past 15 years. Last 3-4 months it has gotten worse with increased pain in her upper back, constant headache and nausea. Few weeks ago she was very dizzy and had symptoms of vertigo and was in bed for 3 days, went to ER and was given medication. She met with PCP and was referred to PT for further management..    Patient reports pain:  Cervical right side.  Radiates to:  Head, face, shoulder right and shoulder left.  Pain is described as aching and is constant and reported as 3/10.  Associated symptoms:  Dizziness, headache, tingling, numbness, loss of strength and fatigue. Pain is worse during the day.  Symptoms are exacerbated by rotating head, change of position and looking up or down and relieved by rest and muscle relaxants.  Since onset symptoms are gradually worsening.        General health as reported by patient is fair.  Pertinent medical history includes:  Seizures, depression and asthma (Dizziness, changes in skin color).      Current medications:  Muscle relaxants and pain medication.  Current occupation is Writer.                                    Objective:    Standing Alignment:    Cervical/Thoracic:  Forward head and thoracic kyphosis increased  Shoulder/UE:  Rounded shoulders, protracted scapula L and protracted scapula R                  Flexibility/Screens:     Upper Extremity:    Decreased left upper extremity flexibility at:  Pectoralis Major and Pectoralis Minor    Decreased right upper extremity flexibility present at:  Pectoralis Major and Pectoralis Minor    Spine:  Decreased left spine flexibility:   Upper Trap    Decreased right spine flexibility:  Upper Trap                  Cervical/Thoracic Evaluation  Cervical AROM: normal (R rot (+), R SB(+))         Headaches: cervical and migraine  Cervical Myotomes:  normal                  DTR's:  normal          Cervical Dermatomes:  normal                    Cervical Palpation:    Tenderness present at Left:    Rhomboids; Upper Trap; Levator and Erector Spinae  Tenderness present at Right:    Rhomboids; Upper Trap; Levator; Erector Spinae and Suboccipitals        Cord Sign:  normal                                            Vero Cervical Evaluation    Posture:  Sitting: poor  Standing: fair  Protruding Head: yes  Wry Neck: no  Correction of Posture: better    Movement Loss:          Lateral Flexion Right (LF R): min and pain    Rotation Right (ROT R): min and pain      Static Tests:      Retraction: better    Conclusion: posture and dysfunction  Principle of Treatment:  Posture Correction: Sitting, standing, working demonstrated, educated and practied, Advised to get lumbar support to help with posture                                                 ROS    Assessment/Plan:      Patient is a 46 year old female with cervical complaints.    Patient has the following significant findings with corresponding treatment plan.                Diagnosis 1:  Headache, Neck pain, Upper back pain, Poor posture  Pain -  hot/cold therapy, US, electric stimulation, manual therapy, STS, splint/taping/bracing/orthotics, self management, education and home program  Decreased strength - therapeutic exercise, therapeutic activities and home program  Decreased function - therapeutic activities and home program  Impaired posture - neuro re-education, therapeutic activities and home program    Therapy Evaluation Codes:   1) History comprised of:   Personal factors that impact the plan of care:      Past/current experiences and Time since onset of symptoms.    Comorbidity factors that  impact the plan of care are:      Asthma, Depression, Dizziness, Migraines/headaches, Numbness/tingling, Pain at night/rest, Seizures and changes in skin color.     Medications impacting care: Muscle relaxant and Pain.  2) Examination of Body Systems comprised of:   Body structures and functions that impact the plan of care:      Cervical spine.   Activity limitations that impact the plan of care are:      Driving, Reading/Computer work, Sitting and Sleeping.  3) Clinical presentation characteristics are:   Stable/Uncomplicated.  4) Decision-Making    Low complexity using standardized patient assessment instrument and/or measureable assessment of functional outcome.  Cumulative Therapy Evaluation is: Low complexity.    Previous and current functional limitations:  (See Goal Flow Sheet for this information)    Short term and Long term goals: (See Goal Flow Sheet for this information)     Communication ability:  Patient appears to be able to clearly communicate and understand verbal and written communication and follow directions correctly.  Treatment Explanation - The following has been discussed with the patient:   RX ordered/plan of care  Anticipated outcomes  Possible risks and side effects  This patient would benefit from PT intervention to resume normal activities.   Rehab potential is good.    Frequency:  1 X week, once daily  Duration:  for 6 weeks  Discharge Plan:  Achieve all LTG.  Independent in home treatment program.  Reach maximal therapeutic benefit.    Please refer to the daily flowsheet for treatment today, total treatment time and time spent performing 1:1 timed codes.

## 2017-05-08 ENCOUNTER — THERAPY VISIT (OUTPATIENT)
Dept: PHYSICAL THERAPY | Facility: CLINIC | Age: 47
End: 2017-05-08
Payer: COMMERCIAL

## 2017-05-08 DIAGNOSIS — M54.9 UPPER BACK PAIN: ICD-10-CM

## 2017-05-08 DIAGNOSIS — G44.219 EPISODIC TENSION-TYPE HEADACHE, NOT INTRACTABLE: ICD-10-CM

## 2017-05-08 DIAGNOSIS — M54.2 NECK PAIN: ICD-10-CM

## 2017-05-08 DIAGNOSIS — R29.3 POOR POSTURE: ICD-10-CM

## 2017-05-08 PROCEDURE — 97112 NEUROMUSCULAR REEDUCATION: CPT | Mod: GP | Performed by: PHYSICAL THERAPIST

## 2017-05-08 PROCEDURE — 97110 THERAPEUTIC EXERCISES: CPT | Mod: GP | Performed by: PHYSICAL THERAPIST

## 2017-05-08 PROCEDURE — 97140 MANUAL THERAPY 1/> REGIONS: CPT | Mod: GP | Performed by: PHYSICAL THERAPIST

## 2017-05-18 ENCOUNTER — TELEPHONE (OUTPATIENT)
Dept: FAMILY MEDICINE | Facility: CLINIC | Age: 47
End: 2017-05-18

## 2017-05-18 DIAGNOSIS — G43.009 MIGRAINE WITHOUT AURA AND WITHOUT STATUS MIGRAINOSUS, NOT INTRACTABLE: ICD-10-CM

## 2017-05-18 DIAGNOSIS — R51.9 SINUS HEADACHE: ICD-10-CM

## 2017-05-18 DIAGNOSIS — R42 VERTIGO: Primary | ICD-10-CM

## 2017-05-18 RX ORDER — RIZATRIPTAN BENZOATE 10 MG/1
10 TABLET ORAL
Qty: 18 TABLET | Refills: 1 | Status: SHIPPED | OUTPATIENT
Start: 2017-05-18 | End: 2017-12-06

## 2017-05-18 NOTE — TELEPHONE ENCOUNTER
Patient states followed up with Dr Melania Vega after this appointment.  Not noted in office visit notes.  Patient states did go to PHYSICAL THERAPY, worked primarily on Muscles.   Imitrex worsens Vertigo.  Potentially sinus symptoms related to Vertigo.   Has sinus pain, headache and Vertigo all at the same time.    Requesting referral for ENT.   Please advise  Cassie Saleh RN

## 2017-05-18 NOTE — TELEPHONE ENCOUNTER
Patient calling wanted to let Dr Vega know she had another episode of vertigo lasted 3 days, declined appt at this time would like a referral to an ENT Dr or Vertigo specialist. Please call to advise.

## 2017-05-18 NOTE — TELEPHONE ENCOUNTER
Patient saw Dr Nava in 4/14/17 Urgent Care for BPV.  Was also given PHYSICAL THERAPY referral.  Left message on answering machine for patient/parent to call back.   [phone].  Cassie Saleh RN

## 2017-05-18 NOTE — TELEPHONE ENCOUNTER
Information below is reviewed with  Dr Melania Vega, Verbal Orders to place referral for ENT.  Specialty phone # is [phone] to schedule an appointment     Will prescribe Maxalt as an alternative for headaches, ideally this will not cause side effects.    The patient/parent agrees with the plan and verbalized good understanding.    Per protocol, will route encounter to be cosigned by provider for Verbal Orders.  Cassie Saleh RN

## 2017-05-24 ENCOUNTER — OFFICE VISIT (OUTPATIENT)
Dept: OTOLARYNGOLOGY | Facility: CLINIC | Age: 47
End: 2017-05-24
Payer: COMMERCIAL

## 2017-05-24 VITALS — WEIGHT: 134.2 LBS | HEIGHT: 67 IN | BODY MASS INDEX: 21.06 KG/M2 | RESPIRATION RATE: 16 BRPM

## 2017-05-24 DIAGNOSIS — R42 VERTIGO: Primary | ICD-10-CM

## 2017-05-24 DIAGNOSIS — G43.019 INTRACTABLE MIGRAINE WITHOUT AURA AND WITHOUT STATUS MIGRAINOSUS: ICD-10-CM

## 2017-05-24 PROCEDURE — 99243 OFF/OP CNSLTJ NEW/EST LOW 30: CPT | Performed by: OTOLARYNGOLOGY

## 2017-05-24 ASSESSMENT — PAIN SCALES - GENERAL: PAINLEVEL: NO PAIN (0)

## 2017-05-24 NOTE — MR AVS SNAPSHOT
After Visit Summary   5/24/2017    An Cornejo    MRN: [id number]           Patient Information     Date Of Birth          1970        Visit Information        Provider Department      5/24/2017 3:45 PM Ion Hernandez MD [facility]        Today's Diagnoses     Vertigo    -  1    Intractable migraine without aura and without status migrainosus          Care Instructions    General Scheduling Information  To schedule your CT/MRI scan, please contact [place] [facility]gu[facility] at [phone] [address]  [place] [address]    To schedule your Surgery, please contact our Specialty Schedulers at [phone]    ENT Clinic Locations Clinic Hours Telephone Number     [facility]  [address]  [place] [address]   Tuesday:       8:00am -- 4:00pm    Wednesday:  8:00am - 4:00pm   To schedule an appointment with   Dr. Hernandez,   please contact our   Specialty Scheduling Department at:     [phone]       [facility]  [address] [place] [address]   Friday:          8:00am - 4:00pm         Urgent Care Locations Clinic Hours Telephone Numbers     [facility]  [address] [place] [address]     Monday-Friday:     11:00pm - 9:00pm    Saturday-Sunday:  9:00am - 5:00pm   [phone]     [facility]  [address] [place] [address]     Monday-Friday:      5:00pm - 9:00pm     Saturday-Sunday:  9:00am - 5:00pm   [phone]               Follow-ups after your visit        Your next 10 appointments already scheduled     May 31, 2017  3:50 PM CDT   CAROL ANN Spine with Renaldo Servin PT   [facility] ([facility]  )    [address]  [employer] [address]   [phone]            Jun 07, 2017  5:50 PM CDT   CAROL ANN Spine with Renaldo Servin PT   [facility] ([facility]  )    [address]  [employer]  "[id number]   [phone]            Jun 14, 2017  5:50 PM CDT   CAROL ANN Spine with Renaldo Servin PT   [facility] [facility] ([facility] [place]  )    [address]  [place] [address]   [phone]              Future tests that were ordered for you today     Open Future Orders        Priority Expected Expires Ordered    MR Brain w/o & w Contrast Routine  5/24/2018 5/24/2017            Who to contact     If you have questions or need follow up information about today's clinic visit or your schedule please contact [facility] directly at [phone].  Normal or non-critical lab and imaging results will be communicated to you by ReserveMyHomehart, letter or phone within 4 business days after the clinic has received the results. If you do not hear from us within 7 days, please contact the clinic through NovaSparkst or phone. If you have a critical or abnormal lab result, we will notify you by phone as soon as possible.  Submit refill requests through "[employer] or call your pharmacy and they will forward the refill request to us. Please allow 3 business days for your refill to be completed.          Additional Information About Your Visit        "[employer] Information     "[employer] gives you secure access to your electronic health record. If you see a primary care provider, you can also send messages to your care team and make appointments. If you have questions, please call your primary care clinic.  If you do not have a primary care provider, please call [phone] and they will assist you.        Care EveryWhere ID     This is your Care EveryWhere ID. This could be used by other organizations to access your [facility] medical records  [id number]        Your Vitals Were     Respirations Height BMI (Body Mass Index)             16 1.708 m (5' 7.25\") 20.86 kg/m2          Blood Pressure from Last 3 Encounters:   04/26/17 110/70   04/14/17 114/78   11/28/16 117/80    Weight from " Last 3 Encounters:   05/24/17 60.9 kg (134 lb 3.2 oz)   04/26/17 61.2 kg (135 lb)   04/14/17 59.9 kg (132 lb)               Primary Care Provider Office Phone # Fax #    Melania Vega [phone]       [facility] [address] [facility] [address]        Thank you!     Thank you for choosing [facility]  for your care. Our goal is always to provide you with excellent care. Hearing back from our patients is one way we can continue to improve our services. Please take a few minutes to complete the written survey that you may receive in the mail after your visit with us. Thank you!             Your Updated Medication List - Protect others around you: Learn how to safely use, store and throw away your medicines at www.disposemymeds.org.          This list is accurate as of: 5/24/17  5:07 PM.  Always use your most recent med list.                   Brand Name Dispense Instructions for use    albuterol 108 (90 BASE) MCG/ACT Inhaler    PROAIR HFA/PROVENTIL HFA/VENTOLIN HFA    1 Inhaler    Inhale 2 puffs into the lungs every 4 hours as needed for shortness of breath / dyspnea       ALPRAZolam 0.25 MG tablet    XANAX    30 tablet    Take 1 tablet (0.25 mg) by mouth 3 times daily as needed for sleep or anxiety       cyclobenzaprine 10 MG tablet    FLEXERIL    14 tablet    Take 1 tablet (10 mg) by mouth nightly as needed for muscle spasms       dicyclomine 20 MG tablet    BENTYL    40 tablet    Take 1 tablet (20 mg) by mouth 4 times daily as needed       fluticasone 50 MCG/ACT spray    FLONASE    9.9 mL    2 sprays each nostril twice daily, for 1 week, then 2 sprays each nostril 1 time daily       metroNIDAZOLE 500 MG tablet    FLAGYL    14 tablet    Take 1 tablet (500 mg) by mouth 2 times daily       MIRENA (52 MG) 20 MCG/24HR IUD   Generic drug:  levonorgestrel      1 each by Intrauterine route once Reported on 4/14/2017       rizatriptan 10 MG tablet    MAXALT    18  tablet    Take 1 tablet (10 mg) by mouth at onset of headache for migraine May repeat in 2 hours. Max 3 tablets/24 hours.       senna 8.6 MG tablet    SENOKOT    60 tablet    Take 1 tablet by mouth daily

## 2017-05-24 NOTE — PATIENT INSTRUCTIONS
General Scheduling Information  To schedule your CT/MRI scan, please contact [place] Salguero at [phone]   [address] [place] [address]    To schedule your Surgery, please contact our Specialty Schedulers at [phone]    ENT Clinic Locations Clinic Hours Telephone Number     [facility] [place]  [address] [place] [address]   Tuesday:       8:00am -- 4:00pm    Wednesday:  8:00am - 4:00pm   To schedule an appointment with   Dr. Hernandez,   please contact our   Specialty Scheduling Department at:     [phone]       [facility]  [address] [place] [address]   Friday:          8:00am - 4:00pm         Urgent Care Locations Clinic Hours Telephone Numbers     [facility] Hand  [address] [place] [address]     Monday-Friday:     11:00pm - 9:00pm    Saturday-Sunday:  9:00am - 5:00pm   [phone]     [facility]  [address] [place] [address]     Monday-Friday:      5:00pm - 9:00pm     Saturday-Sunday:  9:00am - 5:00pm   [phone]

## 2017-05-24 NOTE — PROGRESS NOTES
I am seeing this patient in consultation for sinusitis, dizziness at the request of the provider Dr. Melania Vega.    Chief Complaint - Dizziness; headaches    History of Present Illness - An Cornejo is a 46 year old female who presents with dizziness. The patient describes vertigo in which the entire room spins, or they spin, or there is a sense of motion.  It lasted for 3 days one month ago. This happened again two weeks ago.  This happened once 5 years, but now twice in last month. The patient has significant nausea. Unsure exacerbating factors. The patient has noted no significant ear symptoms. No otalgia, otorrhea, tinnitus, or hearing loss. She notes mild bilateral tinnitus. Has more hyperacusis. The patient has a history of migraines. Daily she feels she has a sinus headache. That is constant. She uses flonase and zyrtec. She feels some postnasal drainage, but doesn't seem to be too discolored. No sinus surgery. Tried imitrex, helped headache, but dizziness and nausea were worse. Migraines in family.     Past Medical History -   Patient Active Problem List   Diagnosis     CARDIOVASCULAR SCREENING; LDL GOAL LESS THAN 160     Anxiety     Mild persistent asthma     Sore throat (viral)     Asthma, exercise induced     Exposure to pertussis     Rectocele     KATIE (stress urinary incontinence, female)     Papanicolaou smear of cervix with low grade squamous intraepithelial lesion (LGSIL)     Episodic tension-type headache, not intractable     Neck pain     Upper back pain     Poor posture       Current Medications -   Current Outpatient Prescriptions:      rizatriptan (MAXALT) 10 MG tablet, Take 1 tablet (10 mg) by mouth at onset of headache for migraine May repeat in 2 hours. Max 3 tablets/24 hours., Disp: 18 tablet, Rfl: 1     metroNIDAZOLE (FLAGYL) 500 MG tablet, Take 1 tablet (500 mg) by mouth 2 times daily, Disp: 14 tablet, Rfl: 0     fluticasone (FLONASE) 50 MCG/ACT spray, 2 sprays each nostril twice  daily, for 1 week, then 2 sprays each nostril 1 time daily, Disp: 9.9 mL, Rfl: 1     albuterol (PROAIR HFA/PROVENTIL HFA/VENTOLIN HFA) 108 (90 BASE) MCG/ACT Inhaler, Inhale 2 puffs into the lungs every 4 hours as needed for shortness of breath / dyspnea, Disp: 1 Inhaler, Rfl: 2     dicyclomine (BENTYL) 20 MG tablet, Take 1 tablet (20 mg) by mouth 4 times daily as needed, Disp: 40 tablet, Rfl: 1     ALPRAZolam (XANAX) 0.25 MG tablet, Take 1 tablet (0.25 mg) by mouth 3 times daily as needed for sleep or anxiety, Disp: 30 tablet, Rfl: 0     senna (SENOKOT) 8.6 MG tablet, Take 1 tablet by mouth daily, Disp: 60 tablet, Rfl: 1     cyclobenzaprine (FLEXERIL) 10 MG tablet, Take 1 tablet (10 mg) by mouth nightly as needed for muscle spasms, Disp: 14 tablet, Rfl: 1     levonorgestrel (MIRENA) 20 MCG/24HR IUD, 1 each by Intrauterine route once Reported on 4/14/2017, Disp: , Rfl:     Allergies -   Allergies   Allergen Reactions     Pain Relieving      Unable to urinate/have BM     Hydrocodone        Social History -   Social History     Social History     Marital status:      Spouse name: N/A     Number of children: N/A     Years of education: N/A     Social History Main Topics     Smoking status: Never Smoker     Smokeless tobacco: Never Used      Comment: nonsmoking household     Alcohol use 0.0 oz/week     0 Standard drinks or equivalent per week      Comment: occasional     Drug use: No     Sexual activity: Yes     Partners: Male     Birth control/ protection: IUD     Other Topics Concern     None     Social History Narrative       Family History -   Family History   Problem Relation Age of Onset     Depression Mother      Hypertension Mother      Lipids Mother      Arthritis Mother      Asthma Mother      Breast Cancer Maternal Grandmother      postmenopausal     CANCER Maternal Grandmother      Respiratory Paternal Grandfather      emphysema     Hypertension Father      CANCER Sister      Thyroid     Neurologic  "Disorder Sister      MS       Review of Systems - As per HPI and PMHx, has some worsening vision issues, otherwise 7 system review of the head and neck negative.    Physical Exam  Resp 16  Ht 1.708 m (5' 7.25\")  Wt 60.9 kg (134 lb 3.2 oz)  BMI 20.86 kg/m2  General - The patient is in no distress.  Alert and oriented x3, answers questions and cooperates with examination appropriately.   Voice and Breathing - The patient was breathing comfortably without the use of accessory muscles. There was no wheezing, stridor, or stertor.  The patients voice was clear and strong, with no dysphonia.    Eyes - Pupils are reactive to light. Extraocular movements intact. Has right eye beating nystagmus on right lateral gaze. None on left gaze. Sclera were not icteric or injected, conjunctiva were pink and moist.   Neurologic - Cranial nerves II-XII are grossly intact. Specifically, the facial nerve is intact, House-Brackmann grade 1 of 6. Finger-nose-finger is normal. Normal Romberg. Normal gait.   Mouth - Examination of the oral cavity showed pink, healthy oral mucosa. No lesions or ulcerations noted.  The tongue was mobile and protrudes midline.   Oropharynx - The walls of the oropharynx were smooth, pink, moist, symmetric, and had no lesions or ulcerations. The uvula was midline and the palate raised symmetrically.   Neck -  Palpation of the occipital, submental, submandibular, internal jugular chain, and supraclavicular nodes did not demonstrate any abnormal lymph nodes or masses. The parotid glands were without masses. Palpation of the thyroid was soft and smooth, with no nodules or goiter appreciated.  The trachea was midline.  Ears - The auricles appeared normal. The external auditory canals were nonedematous and nonerythematous. The tympanic membranes are normal in appearance, bony landmarks are intact.  No retraction, perforation, or masses.  No fluid or purulence was seen in the external canal or the middle ear.     A/P " - An Cornejo is a 46 year old female with dizziness. This seems most likely vertigo. However, she doesn't have much for ear symptoms. She has an extensive migraine history. i worry about vestibular migraine, possibly meniere's. She had some right lateral gaze nystagmus. MS or other neurologic issue is possible. MRI, then possibly neuro referral, and/or vestibular testing. i doubt sinusitis, but MRI will show sinuses.          Ion Hernandez MD  Otolaryngology  [facility]

## 2017-05-24 NOTE — NURSING NOTE
"Chief Complaint   Patient presents with     Sinus Problem     Infections     Dizziness     Tinnitus       Initial Resp 16  Ht 1.708 m (5' 7.25\")  Wt 60.9 kg (134 lb 3.2 oz)  BMI 20.86 kg/m2 Estimated body mass index is 20.86 kg/(m^2) as calculated from the following:    Height as of this encounter: 1.708 m (5' 7.25\").    Weight as of this encounter: 60.9 kg (134 lb 3.2 oz).  Medication Reconciliation: complete     Florence Adler MA    "
Influenza A

## 2017-05-26 ENCOUNTER — RADIANT APPOINTMENT (OUTPATIENT)
Dept: MRI IMAGING | Facility: CLINIC | Age: 47
End: 2017-05-26
Attending: OTOLARYNGOLOGY
Payer: COMMERCIAL

## 2017-05-26 ENCOUNTER — TELEPHONE (OUTPATIENT)
Dept: OTOLARYNGOLOGY | Facility: CLINIC | Age: 47
End: 2017-05-26

## 2017-05-26 DIAGNOSIS — G43.809 VESTIBULAR MIGRAINE: Primary | ICD-10-CM

## 2017-05-26 DIAGNOSIS — R42 VERTIGO: ICD-10-CM

## 2017-05-26 DIAGNOSIS — J34.89 RHINORRHEA: ICD-10-CM

## 2017-05-26 PROCEDURE — 70553 MRI BRAIN STEM W/O & W/DYE: CPT | Mod: TC

## 2017-05-26 PROCEDURE — A9585 GADOBUTROL INJECTION: HCPCS | Mod: JW | Performed by: OTOLARYNGOLOGY

## 2017-05-26 RX ORDER — IPRATROPIUM BROMIDE 42 UG/1
2 SPRAY, METERED NASAL 4 TIMES DAILY PRN
Qty: 1 BOX | Refills: 3 | Status: SHIPPED | OUTPATIENT
Start: 2017-05-26 | End: 2017-08-07

## 2017-05-26 RX ORDER — GADOBUTROL 604.72 MG/ML
7.5 INJECTION INTRAVENOUS ONCE
Status: COMPLETED | OUTPATIENT
Start: 2017-05-26 | End: 2017-05-26

## 2017-05-26 RX ADMIN — GADOBUTROL 6 ML: 604.72 INJECTION INTRAVENOUS at 08:10

## 2017-05-26 NOTE — TELEPHONE ENCOUNTER
MRI mostly normal, maybe a few small white spots that can be attributed migraines. Will send to neurology for migraine prophylaxis and vestibular migraine treatment. Try atrovent for vasomotor rhinitis.

## 2017-05-31 ENCOUNTER — THERAPY VISIT (OUTPATIENT)
Dept: PHYSICAL THERAPY | Facility: CLINIC | Age: 47
End: 2017-05-31
Payer: COMMERCIAL

## 2017-05-31 DIAGNOSIS — R29.3 POOR POSTURE: ICD-10-CM

## 2017-05-31 DIAGNOSIS — G44.219 EPISODIC TENSION-TYPE HEADACHE, NOT INTRACTABLE: ICD-10-CM

## 2017-05-31 DIAGNOSIS — M54.9 UPPER BACK PAIN: ICD-10-CM

## 2017-05-31 DIAGNOSIS — M54.2 NECK PAIN: ICD-10-CM

## 2017-05-31 PROCEDURE — 97140 MANUAL THERAPY 1/> REGIONS: CPT | Mod: GP | Performed by: PHYSICAL THERAPIST

## 2017-05-31 PROCEDURE — 97110 THERAPEUTIC EXERCISES: CPT | Mod: GP | Performed by: PHYSICAL THERAPIST

## 2017-06-14 ENCOUNTER — OFFICE VISIT (OUTPATIENT)
Dept: NEUROLOGY | Facility: CLINIC | Age: 47
End: 2017-06-14
Payer: COMMERCIAL

## 2017-06-14 ENCOUNTER — THERAPY VISIT (OUTPATIENT)
Dept: PHYSICAL THERAPY | Facility: CLINIC | Age: 47
End: 2017-06-14
Payer: COMMERCIAL

## 2017-06-14 VITALS
SYSTOLIC BLOOD PRESSURE: 104 MMHG | DIASTOLIC BLOOD PRESSURE: 75 MMHG | WEIGHT: 132.6 LBS | BODY MASS INDEX: 20.81 KG/M2 | HEART RATE: 101 BPM | HEIGHT: 67 IN

## 2017-06-14 DIAGNOSIS — R29.3 POOR POSTURE: ICD-10-CM

## 2017-06-14 DIAGNOSIS — M54.9 UPPER BACK PAIN: ICD-10-CM

## 2017-06-14 DIAGNOSIS — M54.2 NECK PAIN: ICD-10-CM

## 2017-06-14 DIAGNOSIS — G44.219 EPISODIC TENSION-TYPE HEADACHE, NOT INTRACTABLE: ICD-10-CM

## 2017-06-14 DIAGNOSIS — G43.809 VESTIBULAR MIGRAINE: Primary | ICD-10-CM

## 2017-06-14 PROCEDURE — 99244 OFF/OP CNSLTJ NEW/EST MOD 40: CPT | Performed by: PSYCHIATRY & NEUROLOGY

## 2017-06-14 PROCEDURE — 97110 THERAPEUTIC EXERCISES: CPT | Mod: GP | Performed by: PHYSICAL THERAPIST

## 2017-06-14 PROCEDURE — 97140 MANUAL THERAPY 1/> REGIONS: CPT | Mod: GP | Performed by: PHYSICAL THERAPIST

## 2017-06-14 RX ORDER — VERAPAMIL HYDROCHLORIDE 40 MG/1
TABLET ORAL
Qty: 270 TABLET | Refills: 3 | Status: SHIPPED | OUTPATIENT
Start: 2017-06-14 | End: 2018-09-11

## 2017-06-14 NOTE — MR AVS SNAPSHOT
After Visit Summary   6/14/2017    An Cornejo    MRN: [id number]           Patient Information     Date Of Birth          1970        Visit Information        Provider Department      6/14/2017 2:00 PM Johnny Gamble MD [facility]        Today's Diagnoses     Vestibular migraine    -  1      Care Instructions    AFTER VISIT SUMMARY (AVS)    Signed Prescriptions:                        Disp   Refills    verapamil (CALAN) 40 MG tablet             270 ta*3        Sig: Week 1: 40 mg at bed time. Week 2: 40 mg two times a           day. Week 3 & afterwards: 40 mg three times  Authorizing Provider: JOHNNY GAMBLE      Maxalt  for acute migraine. Not to take more than 2-3 times in a week to avoid the risk of rebound headache    VERAPAMIL for migraine prevention    Educated about  the risk of rebound headache with excessive use of either over-the-counter or prescribed medications for acute headache/migraine.    Encouraged to :        A. Keep physically and mentally active with particular emphasis on daily stretching exercises, walking, and healthy eating.          B. Read the educational material on headache and sleep hygiene.      To call us for follow-up appointment in next 3 month(s) or earlier if needed.    For acute emergency, you need to go to local ER & call for follow-up with us for further review.      Helpful Sources:  1. Http://www.headaches.org/  2. Migraine and other Headaches, Marco Reis M.D., Jose Maria Schaffer MD 3. Heal Your Headache (1-2-3 Program) by Chapito Ricketts MD    Thanks              Follow-ups after your visit        Follow-up notes from your care team     Return in about 3 months (around 9/14/2017).      Your next 10 appointments already scheduled     Jun 14, 2017  5:50 PM CDT   CAROL ANN Spine with Renaldo Servin PT   [facility] ([facility] [place]  )    [address]  [facility] [address]  "  [phone]              Who to contact     If you have questions or need follow up information about today's clinic visit or your schedule please contact [facility] directly at [phone].  Normal or non-critical lab and imaging results will be communicated to you by MyChart, letter or phone within 4 business days after the clinic has received the results. If you do not hear from us within 7 days, please contact the clinic through AngelListhart or phone. If you have a critical or abnormal lab result, we will notify you by phone as soon as possible.  Submit refill requests through [employer] or call your pharmacy and they will forward the refill request to us. Please allow 3 business days for your refill to be completed.          Additional Information About Your Visit        [employer] Information     [employer] gives you secure access to your electronic health record. If you see a primary care provider, you can also send messages to your care team and make appointments. If you have questions, please call your primary care clinic.  If you do not have a primary care provider, please call [phone] and they will assist you.        Care EveryWhere ID     This is your Care EveryWhere ID. This could be used by other organizations to access your [place] medical records  [id number]        Your Vitals Were     Pulse Height BMI (Body Mass Index)             101 1.708 m (5' 7.25\") 20.61 kg/m2          Blood Pressure from Last 3 Encounters:   06/14/17 104/75   04/26/17 110/70   04/14/17 114/78    Weight from Last 3 Encounters:   06/14/17 60.1 kg (132 lb 9.6 oz)   05/24/17 60.9 kg (134 lb 3.2 oz)   04/26/17 61.2 kg (135 lb)              Today, you had the following     No orders found for display         Today's Medication Changes          These changes are accurate as of: 6/14/17  4:23 PM.  If you have any questions, ask your nurse or doctor.               Start taking these medicines.        Dose/Directions    " verapamil 40 MG tablet   Commonly known as:  CALAN   Used for:  Vestibular migraine   Started by:  Johnny Fulton MD        Week 1: 40 mg at bed time. Week 2: 40 mg two times a day. Week 3 & afterwards: 40 mg three times   Quantity:  270 tablet   Refills:  3         These medicines have changed or have updated prescriptions.        Dose/Directions    ALPRAZolam 0.25 MG tablet   Commonly known as:  XANAX   This may have changed:  additional instructions   Used for:  Anxiety        Dose:  0.25 mg   Take 1 tablet (0.25 mg) by mouth 3 times daily as needed for sleep or anxiety   Quantity:  30 tablet   Refills:  0       ipratropium 0.06 % spray   Commonly known as:  ATROVENT   This may have changed:    - when to take this  - additional instructions   Used for:  Rhinorrhea        Dose:  2 spray   Spray 2 sprays into both nostrils 4 times daily as needed   Quantity:  1 Box   Refills:  3            Where to get your medicines      These medications were sent to [facility]pharm[facility] #[id number] - [address],  [address], [facility] [address]     Phone:  [phone]     verapamil 40 MG tablet                Primary Care Provider Office Phone # Fax #    Melania Vega [phone]       [facility] [address] [facility] [address]        Thank you!     Thank you for choosing [facility]  for your care. Our goal is always to provide you with excellent care. Hearing back from our patients is one way we can continue to improve our services. Please take a few minutes to complete the written survey that you may receive in the mail after your visit with us. Thank you!             Your Updated Medication List - Protect others around you: Learn how to safely use, store and throw away your medicines at www.disposemymeds.org.          This list is accurate as of: 6/14/17  4:23 PM.  Always use your most recent med list.                    Brand Name Dispense Instructions for use    albuterol 108 (90 BASE) MCG/ACT Inhaler    PROAIR HFA/PROVENTIL HFA/VENTOLIN HFA    1 Inhaler    Inhale 2 puffs into the lungs every 4 hours as needed for shortness of breath / dyspnea       ALPRAZolam 0.25 MG tablet    XANAX    30 tablet    Take 1 tablet (0.25 mg) by mouth 3 times daily as needed for sleep or anxiety       cyclobenzaprine 10 MG tablet    FLEXERIL    14 tablet    Take 1 tablet (10 mg) by mouth nightly as needed for muscle spasms       dicyclomine 20 MG tablet    BENTYL    40 tablet    Take 1 tablet (20 mg) by mouth 4 times daily as needed       fluticasone 50 MCG/ACT spray    FLONASE    9.9 mL    2 sprays each nostril twice daily, for 1 week, then 2 sprays each nostril 1 time daily       ipratropium 0.06 % spray    ATROVENT    1 Box    Spray 2 sprays into both nostrils 4 times daily as needed       MIRENA (52 MG) 20 MCG/24HR IUD   Generic drug:  levonorgestrel      1 each by Intrauterine route once Reported on 4/14/2017       rizatriptan 10 MG tablet    MAXALT    18 tablet    Take 1 tablet (10 mg) by mouth at onset of headache for migraine May repeat in 2 hours. Max 3 tablets/24 hours.       senna 8.6 MG tablet    SENOKOT    60 tablet    Take 1 tablet by mouth daily       verapamil 40 MG tablet    CALAN    270 tablet    Week 1: 40 mg at bed time. Week 2: 40 mg two times a day. Week 3 & afterwards: 40 mg three times

## 2017-06-14 NOTE — NURSING NOTE
"Chief Complaint   Patient presents with     Neurologic Problem     Vestibular Migraine        Initial /75 (BP Location: Right arm, Patient Position: Chair, Cuff Size: Adult Regular)  Pulse 101  Ht 5' 7.25\" (1.708 m)  Wt 132 lb 9.6 oz (60.1 kg)  BMI 20.61 kg/m2 Estimated body mass index is 20.61 kg/(m^2) as calculated from the following:    Height as of this encounter: 5' 7.25\" (1.708 m).    Weight as of this encounter: 132 lb 9.6 oz (60.1 kg).  Medication Reconciliation: complete   Yana Cai MA      "

## 2017-06-14 NOTE — PATIENT INSTRUCTIONS
AFTER VISIT SUMMARY (AVS)    Signed Prescriptions:                        Disp   Refills    verapamil (CALAN) 40 MG tablet             270 ta*3        Sig: Week 1: 40 mg at bed time. Week 2: 40 mg two times a           day. Week 3 & afterwards: 40 mg three times  Authorizing Provider: CHARLIE GAMBLE  for acute migraine. Not to take more than 2-3 times in a week to avoid the risk of rebound headache    VERAPAMIL for migraine prevention    Educated about  the risk of rebound headache with excessive use of either over-the-counter or prescribed medications for acute headache/migraine.    Encouraged to :        A. Keep physically and mentally active with particular emphasis on daily stretching exercises, walking, and healthy eating.          B. Read the educational material on headache and sleep hygiene.      To call us for follow-up appointment in next 3 month(s) or earlier if needed.    For acute emergency, you need to go to local ER & call for follow-up with us for further review.      Helpful Sources:  1. Http://www.headaches.org/  2. Migraine and other Headaches, Marco Reis M.D., Jose Maria Schaffer MD 3. Heal Your Headache (1-2-3 Program) by Chapito Ricketts MD    Thanks

## 2017-06-14 NOTE — PROGRESS NOTES
"INITIAL NEUROLOGY CONSULTATION    LOCATION: [facility]   DATE OF VISIT: 2017  MRN: [id number]  NAME: Ms. An Cornejo  :  1970 (46 year old)    REFERRING/PRIMARY CARE PROVIDER: Melania Vega MD    REASON FOR CONSULTATION: Headache    HISTORY OF PRESENT ILLNESS ([place]):    Ms. Cornejo is seen at the request of Dr. Ion Hernandez. 46-year-old Right-handed woman started having headaches age 29. Headaches used to be once a year till age 34. Around age 34 she started having \"sinus headaches\". Today her main  concern is that she has been having frequent headaches for last 6 months. She will acute headache 3 times a week. No visual aura. Each headache is quite severe and describes as feeling off as \"carlene passing through her right eye to the back of the head\" although headache is distributed all over her head with severity of 7/10. With severe headaches she would get vertigo and her description is that her head is spinning and there are times objects around her spinning. Associated symptoms include nausea, photophobia and sonophobia. Dietary trigger include wine.  Environmental triggers include strong smells-perfume & gasoline. Sleep does not help with the headache.  H/o car sickness.  Strong family h/o migraine-mother and all her siblings    PMH of anxiety.Takes medication for it.  No h/o hypertension.    Review of Systems: All negative except as noted in the [place] and Identifying information.    Current Outpatient Prescriptions on File Prior to Visit:  ipratropium (ATROVENT) 0.06 % spray Spray 2 sprays into both nostrils 4 times daily as needed (Patient taking differently: Spray 2 sprays into both nostrils daily Patient says she uses this daily)   fluticasone (FLONASE) 50 MCG/ACT spray 2 sprays each nostril twice daily, for 1 week, then 2 sprays each nostril 1 time daily   albuterol (PROAIR HFA/PROVENTIL HFA/VENTOLIN HFA) 108 (90 BASE) MCG/ACT Inhaler Inhale 2 puffs into the lungs every 4 hours as needed " "for shortness of breath / dyspnea   dicyclomine (BENTYL) 20 MG tablet Take 1 tablet (20 mg) by mouth 4 times daily as needed   ALPRAZolam (XANAX) 0.25 MG tablet Take 1 tablet (0.25 mg) by mouth 3 times daily as needed for sleep or anxiety (Patient taking differently: Take 0.25 mg by mouth 3 times daily as needed for sleep or anxiety Per patient she says if she needs this medication she takes 2 tablets at a time)   cyclobenzaprine (FLEXERIL) 10 MG tablet Take 1 tablet (10 mg) by mouth nightly as needed for muscle spasms   levonorgestrel (MIRENA) 20 MCG/24HR IUD 1 each by Intrauterine route once Reported on 4/14/2017   rizatriptan (MAXALT) 10 MG tablet Take 1 tablet (10 mg) by mouth at onset of headache for migraine May repeat in 2 hours. Max 3 tablets/24 hours. (Patient not taking: Reported on 6/14/2017)   senna (SENOKOT) 8.6 MG tablet Take 1 tablet by mouth daily (Patient not taking: Reported on 6/14/2017)     No current facility-administered medications on file prior to visit.   Allergies   Allergen Reactions     Pain Relieving      Unable to urinate/have BM     Hydrocodone      Past Medical History:   Diagnosis Date     Anxiety     Use medication as needed     Asthma     Uses inhaler as needed     Episodic tension type headache     Started when she was in her 30's intermittent     IUD     Mirena inserted 6/5/13     Neck pain     Both side of neck     Papanicolaou smear of cervix with low grade squamous intraepithelial lesion (LGSIL) 10/31/16    + HR HPV 16 & other     Seizures (H) infancy    \"grew out of it\"     Status post colposcopy 11/28/16    ECC - negative     Upper back pain     Over 5 years or more     Past Surgical History:   Procedure Laterality Date     BREAST LUMPECTOMY, RT/LT Left     Benign lesion as per patient     COLPORRHAPHY POSTERIOR N/A 10/21/2014    Procedure: COLPORRHAPHY POSTERIOR;  Surgeon: Fabian Harding MD;  Location: [facility] OR     CYSTOSCOPY, SLING TRANSVAGINAL N/A 10/21/2014    " "Procedure: CYSTOSCOPY, SLING TRANSVAGINAL;  Surgeon: Fabian Harding MD;  Location: [facility] OR     EXTRACTION(S) DENTAL       PERINEORRHAPHY N/A 10/21/2014    Procedure: PERINEORRHAPHY;  Surgeon: Fabian Harding MD;  Location: [facility] OR     RECONSTRUCT BREAST BILATERAL       Social History   Substance Use Topics     Smoking status: Never Smoker     Smokeless tobacco: Never Used      Comment: nonsmoking household     Alcohol use 0.0 oz/week     0 Standard drinks or equivalent per week      Comment: 2 glasses of gin and tonic may be once per month         GENERAL EXAMINATION:    General appearance: Pleasant female sitting comfortably in a chair  /75 (BP Location: Right arm, Patient Position: Chair, Cuff Size: Adult Regular)  Pulse 101  Ht 1.708 m (5' 7.25\")  Wt 60.1 kg (132 lb 9.6 oz)  BMI 20.61 kg/m2    NEUROLOGICAL EXAMINATION:    Head & Neck:  Neck supple  No carotid bruit  Mental Status:    Alert and oriented to time, place and person    Recent and remote memory intact    Attention span and concentration normal    Adequate fund of knowledge  Speech: Normal  Cranial Nerves:  Cranial Nerve 2:    Visual acuity normal to finger counting    Pupils equal and reacting to light    No field defect by confrontation    Fundus reveals normal disc margins      Cranial Nerves 3, 4 and 6:    Eye movements normal in all directions of gaze    Cranial Nerve 5:     Normal facial sensory and motor functions    Cranial Nerve 7:     Symmetrical face without motor weakness     Cranial Nerve 8:    Normal hearing to whispered sounds    Cranial Nerves 9, 10:    Normal palate and uvula movements    Cranial Nerve 11:    Shoulder shrug symmetrical    Cranial Nerve 12:    Tongue midline with normal movements    Motor:    Tone and bulk: Normal in both upper and lower limbs    Power: No drift of the outstretched arms          Normal strength in all muscle groups of both upper and lower limbs     Coordination:    Finger nose test and rapid " alternate movements normal bilaterally    Heel-shin test normal bilaterally    Deep Tendon Reflexes:    Upper limbs: Equal and symmetrical    Lower limbs: Equal and symmetrical with intact ankle jerks and down going plantars      Sensations:    Touch/Pin prick: Normal at both and upper and lower limbs    Vibration (128 Hz): Normal at both ankles    Position sense: Normal at both big toes    Gait:    Walks with a normal stride length and a normal arm swing    Can stand on heels and toes    Can walk on heels and toes    Normal tandem walking    Romberg Sign: Negative  IMPRESSION:  Encounter Diagnoses   Name Primary?     Vestibular migraine Yes     PLANS:  Patient Instructions   AFTER VISIT SUMMARY (AVS)    Signed Prescriptions:                        Disp   Refills    verapamil (CALAN) 40 MG tablet             270 ta*3        Sig: Week 1: 40 mg at bed time. Week 2: 40 mg two times a           day. Week 3 & afterwards: 40 mg three times  Authorizing Provider: CHARLIE GAMBLE for acute migraine. Not to take more than 2-3 times in a week to avoid the risk of rebound headache    VERAPAMIL for migraine prevention    Educated about  the risk of rebound headache with excessive use of either over-the-counter or prescribed medications for acute headache/migraine.    Encouraged to :        A. Keep physically and mentally active with particular emphasis on daily stretching exercises, walking, and healthy eating.          B. Read the educational material on headache and sleep hygiene.      To call us for follow-up appointment in next 3 month(s) or earlier if needed.    For acute emergency, you need to go to local ER & call for follow-up with us for further review.      Helpful Sources:  1. Http://www.headaches.org/  2. Migraine and other Headaches, Marco Reis M.D., Jose Maria Schaffer MD 3. Heal Your Headache (1-2-3 Program) by Chapito Ricketts MD    Thanks to Dr. Ion Hernandez and Melania Vega MD for  allowing me to participate in Ms. Cornejo's care.  Please feel free to call me with any questions or concerns.     Time with patient 60 minutes, greater than 50% of which was counseling and coordination of care.    Johnny Fulton MD, [facility]  Neurologist    cc: Dr. Ion Vega MD

## 2017-07-12 ENCOUNTER — THERAPY VISIT (OUTPATIENT)
Dept: PHYSICAL THERAPY | Facility: CLINIC | Age: 47
End: 2017-07-12
Payer: COMMERCIAL

## 2017-07-12 DIAGNOSIS — M54.2 NECK PAIN: ICD-10-CM

## 2017-07-12 DIAGNOSIS — G44.219 EPISODIC TENSION-TYPE HEADACHE, NOT INTRACTABLE: ICD-10-CM

## 2017-07-12 DIAGNOSIS — R29.3 POOR POSTURE: ICD-10-CM

## 2017-07-12 DIAGNOSIS — M54.9 UPPER BACK PAIN: ICD-10-CM

## 2017-07-12 PROCEDURE — 97112 NEUROMUSCULAR REEDUCATION: CPT | Mod: GP | Performed by: PHYSICAL THERAPIST

## 2017-07-12 PROCEDURE — 97110 THERAPEUTIC EXERCISES: CPT | Mod: GP | Performed by: PHYSICAL THERAPIST

## 2017-07-12 PROCEDURE — 97140 MANUAL THERAPY 1/> REGIONS: CPT | Mod: GP | Performed by: PHYSICAL THERAPIST

## 2017-07-12 NOTE — MR AVS SNAPSHOT
After Visit Summary   7/12/2017    An Cornejo    MRN: [id number]           Patient Information     Date Of Birth          1970        Visit Information        Provider Department      7/12/2017 5:10 PM Renaldo Servin, PT [facility] Physical Therapy        Today's Diagnoses     Episodic tension-type headache, not intractable        Neck pain        Upper back pain        Poor posture           Follow-ups after your visit        Your next 10 appointments already scheduled     Jul 19, 2017  5:10 PM CDT   CAROL ANN Spine with Renaldo Servin PT   [facility] Physical Therapy ([facility]  )    [address]  [facility] [address]   [phone]            Jul 26, 2017  5:50 PM CDT   CAROL ANN Spine with Renaldo Servin PT   [facility] Physical Therapy ([facility]  )    [address]  [facility] [address]   [phone]            Aug 02, 2017  5:50 PM CDT   CAROL ANN Spine with Renaldo Servin PT   [facility] Physical Therapy ([facility]  )    [address]  [facility] [address]   [phone]              Who to contact     If you have questions or need follow up information about today's clinic visit or your schedule please contact [facility] PHYSICAL THERAPY directly at [phone].  Normal or non-critical lab and imaging results will be communicated to you by MyChart, letter or phone within 4 business days after the clinic has received the results. If you do not hear from us within 7 days, please contact the clinic through MyChart or phone. If you have a critical or abnormal lab result, we will notify you by phone as soon as possible.  Submit refill requests through [employer] or call your pharmacy and they will forward the refill request to us. Please allow 3 business days for your refill to be completed.           Additional Information About Your Visit        MyChart Information     [employer] gives you secure access to your electronic health record. If you see a primary care provider, you can also send messages to your care team and make appointments. If you have questions, please call your primary care clinic.  If you do not have a primary care provider, please call [phone] and they will assist you.        Care EveryWhere ID     This is your Care EveryWhere ID. This could be used by other organizations to access your [place] medical records  [id number]         Blood Pressure from Last 3 Encounters:   06/14/17 104/75   04/26/17 110/70   04/14/17 114/78    Weight from Last 3 Encounters:   06/14/17 60.1 kg (132 lb 9.6 oz)   05/24/17 60.9 kg (134 lb 3.2 oz)   04/26/17 61.2 kg (135 lb)              We Performed the Following     MANUAL THER TECH,1+REGIONS,EA 15 MIN     NEUROMUSCULAR RE-EDUCATION     THERAPEUTIC EXERCISES          Today's Medication Changes          These changes are accurate as of: 7/12/17  5:51 PM.  If you have any questions, ask your nurse or doctor.               These medicines have changed or have updated prescriptions.        Dose/Directions    ALPRAZolam 0.25 MG tablet   Commonly known as:  XANAX   This may have changed:  additional instructions   Used for:  Anxiety        Dose:  0.25 mg   Take 1 tablet (0.25 mg) by mouth 3 times daily as needed for sleep or anxiety   Quantity:  30 tablet   Refills:  0       ipratropium 0.06 % spray   Commonly known as:  ATROVENT   This may have changed:    - when to take this  - additional instructions   Used for:  Rhinorrhea        Dose:  2 spray   Spray 2 sprays into both nostrils 4 times daily as needed   Quantity:  1 Box   Refills:  3                Primary Care Provider Office Phone # Fax #    Melania Vega [phone]       [facility] [address] [address] [facility] [address]        Equal Access to Services     BERNABE  GAAR : Hadii aad ku hadjarek De [place]ali, waaxda luqadaha, qaybta kaalmada adejacquie, geovanny anain hayaaradha barajas zhao pedrito . So [facility] [phone].    ATENCIÓN: Si habla sara, tiene a mejía disposición servicios gratuitos de asistencia lingüística. Llame al [phone].    We comply with applicable federal civil rights laws and [place] laws. We do not discriminate on the basis of race, color, national origin, age, disability sex, sexual orientation or gender identity.            Thank you!     Thank you for choosing [facility]  for your care. Our goal is always to provide you with excellent care. Hearing back from our patients is one way we can continue to improve our services. Please take a few minutes to complete the written survey that you may receive in the mail after your visit with us. Thank you!             Your Updated Medication List - Protect others around you: Learn how to safely use, store and throw away your medicines at www.disposemymeds.org.          This list is accurate as of: 7/12/17  5:51 PM.  Always use your most recent med list.                   Brand Name Dispense Instructions for use Diagnosis    albuterol 108 (90 BASE) MCG/ACT Inhaler    PROAIR HFA/PROVENTIL HFA/VENTOLIN HFA    1 Inhaler    Inhale 2 puffs into the lungs every 4 hours as needed for shortness of breath / dyspnea    Mild persistent asthma with exacerbation       ALPRAZolam 0.25 MG tablet    XANAX    30 tablet    Take 1 tablet (0.25 mg) by mouth 3 times daily as needed for sleep or anxiety    Anxiety       cyclobenzaprine 10 MG tablet    FLEXERIL    14 tablet    Take 1 tablet (10 mg) by mouth nightly as needed for muscle spasms    Cervical radiculopathy       dicyclomine 20 MG tablet    BENTYL    40 tablet    Take 1 tablet (20 mg) by mouth 4 times daily as needed    Abdominal cramps       fluticasone 50 MCG/ACT spray    FLONASE    9.9 mL    2 sprays each nostril twice daily,  for 1 week, then 2 sprays each nostril 1 time daily    Seasonal allergic rhinitis, unspecified allergic rhinitis trigger       ipratropium 0.06 % spray    ATROVENT    1 Box    Spray 2 sprays into both nostrils 4 times daily as needed    Rhinorrhea       MIRENA (52 MG) 20 MCG/24HR IUD   Generic drug:  levonorgestrel      1 each by Intrauterine route once Reported on 4/14/2017        rizatriptan 10 MG tablet    MAXALT    18 tablet    Take 1 tablet (10 mg) by mouth at onset of headache for migraine May repeat in 2 hours. Max 3 tablets/24 hours.    Migraine without aura and without status migrainosus, not intractable       senna 8.6 MG tablet    SENOKOT    60 tablet    Take 1 tablet by mouth daily    Diarrhea, unspecified type       verapamil 40 MG tablet    CALAN    270 tablet    Week 1: 40 mg at bed time. Week 2: 40 mg two times a day. Week 3 & afterwards: 40 mg three times    Vestibular migraine

## 2017-07-18 DIAGNOSIS — J30.2 SEASONAL ALLERGIC RHINITIS: ICD-10-CM

## 2017-07-18 RX ORDER — FLUTICASONE PROPIONATE 50 MCG
SPRAY, SUSPENSION (ML) NASAL
Qty: 16 ML | Refills: 1 | Status: SHIPPED | OUTPATIENT
Start: 2017-07-18 | End: 2020-05-01

## 2017-07-18 NOTE — TELEPHONE ENCOUNTER
fluticasone (FLONASE) 50 MCG/ACT spray      Last Written Prescription Date: 4/14/17  Last Fill Quantity: 9.9ml,  # refills: 1   Last Office Visit with [facility], [facility] or [facility] prescribing provider: 6/14/17

## 2017-08-07 DIAGNOSIS — J34.89 RHINORRHEA: ICD-10-CM

## 2017-08-07 NOTE — TELEPHONE ENCOUNTER
Pending Prescriptions:                       Disp   Refills    ipratropium (ATROVENT) 0.06 % spray       1 Box  3            Sig: Spray 2 sprays into both nostrils 4 times daily           as needed

## 2017-08-08 RX ORDER — IPRATROPIUM BROMIDE 42 UG/1
2 SPRAY, METERED NASAL 4 TIMES DAILY PRN
Qty: 1 BOX | Refills: 11 | Status: SHIPPED | OUTPATIENT
Start: 2017-08-08 | End: 2020-05-01

## 2017-08-21 ENCOUNTER — NURSE TRIAGE (OUTPATIENT)
Dept: NURSING | Facility: CLINIC | Age: 47
End: 2017-08-21

## 2017-08-21 NOTE — TELEPHONE ENCOUNTER
Patient is currently at airport in [place] and will be boarding a flight in [facility] 30 minutes.  She will have a layover in [place] before arriving home later today.  States she is having vestibular migraine and has taken the one tablet of Maxalt she has with her and migraine continues.  STEPHEN called [facility] in attempt to reach Dr. Fulton and was told out of clinic today and not available.  FNA informed patient and reviewed directions for Verapamil with her and she will take her second of third daily dose of that and see if that is effective.

## 2017-08-29 PROBLEM — M54.2 NECK PAIN: Status: RESOLVED | Noted: 2017-05-01 | Resolved: 2017-08-29

## 2017-08-29 PROBLEM — R29.3 POOR POSTURE: Status: RESOLVED | Noted: 2017-05-01 | Resolved: 2017-08-29

## 2017-08-29 PROBLEM — G44.219 EPISODIC TENSION-TYPE HEADACHE, NOT INTRACTABLE: Status: RESOLVED | Noted: 2017-05-01 | Resolved: 2017-08-29

## 2017-08-29 PROBLEM — M54.9 UPPER BACK PAIN: Status: RESOLVED | Noted: 2017-05-01 | Resolved: 2017-08-29

## 2017-08-29 NOTE — PROGRESS NOTES
"Discharge Note    Progress reporting period is from initial eval to Jul 12, 2017.     An failed to return for next follow up visit and current status is unknown.  Please see information below for last relevant information on current status.  Patient seen for 5 visits.  SUBJECTIVE  Subjective changes noted by patient:  Pt reports her neck continues to do well. States was i [place] for the past 2 weeks and tolerated well without headaches and neck pain. Once she retuned had some headaches she relates to allergies.   .  Current pain level is 0/10.     Previous pain level was  3/10.   Changes in function:  Yes (See Goal flowsheet attached for changes in current functional level)  Adverse reaction to treatment or activity: None    OBJECTIVE  Changes noted in objective findings: Flexion chin 2\" to sternum, no pain, stretcing only, extension 70 degrees, no pain, SB L 45, R 43, no pain, rotation 80 deg, mild tweak of pain both ways end range. Posture much improved, one mild headache in the past week.      ASSESSMENT/PLAN  Diagnosis: Headache, Neck pain, Upper back pain   DIAGP:  Diagnoses of Episodic tension-type headache, not intractable, Neck pain, Upper back pain, and Poor posture were pertinent to this visit.  Updated problem list and treatment plan:   Pain - HEP  Decreased ROM/flexibility - HEP  STG/LTGs have been met or progress has been made towards goals:  Yes, please see goal flowsheet for most current information  Assessment of Progress: current status is unknown.    Last current status: Pt is progressing as expected   Self Management Plans:  HEP  I have re-evaluated this patient and find that the nature, scope, duration and intensity of the therapy is appropriate for the medical condition of the patient.  An continues to require the following intervention to meet STG and LTG's:  HEP.    Recommendations:  Discharge with current home program.  Patient to follow up with MD as needed.    Please refer to the " daily flowsheet for treatment today, total treatment time and time spent performing 1:1 timed codes.

## 2017-09-18 ENCOUNTER — OFFICE VISIT (OUTPATIENT)
Dept: URGENT CARE | Facility: URGENT CARE | Age: 47
End: 2017-09-18
Payer: COMMERCIAL

## 2017-09-18 ENCOUNTER — NURSE TRIAGE (OUTPATIENT)
Dept: NURSING | Facility: CLINIC | Age: 47
End: 2017-09-18

## 2017-09-18 VITALS
SYSTOLIC BLOOD PRESSURE: 115 MMHG | TEMPERATURE: 97.9 F | HEART RATE: 65 BPM | WEIGHT: 133.2 LBS | DIASTOLIC BLOOD PRESSURE: 77 MMHG | BODY MASS INDEX: 20.71 KG/M2 | OXYGEN SATURATION: 99 %

## 2017-09-18 DIAGNOSIS — N76.0 VAGINITIS AND VULVOVAGINITIS: ICD-10-CM

## 2017-09-18 DIAGNOSIS — R05.9 COUGH: Primary | ICD-10-CM

## 2017-09-18 PROCEDURE — 99214 OFFICE O/P EST MOD 30 MIN: CPT | Performed by: FAMILY MEDICINE

## 2017-09-18 RX ORDER — AZITHROMYCIN 250 MG/1
TABLET, FILM COATED ORAL
Qty: 6 TABLET | Refills: 0 | Status: SHIPPED | OUTPATIENT
Start: 2017-09-18 | End: 2017-12-06

## 2017-09-18 NOTE — NURSING NOTE
"Chief Complaint   Patient presents with     Sinus Problem     cough, runny/ stuffy nose X 1 week       Initial /77  Pulse 65  Temp 97.9  F (36.6  C) (Oral)  Wt 133 lb 3.2 oz (60.4 kg)  SpO2 99%  BMI 20.71 kg/m2 Estimated body mass index is 20.71 kg/(m^2) as calculated from the following:    Height as of 6/14/17: 5' 7.25\" (1.708 m).    Weight as of this encounter: 133 lb 3.2 oz (60.4 kg).  Medication Reconciliation: complete   Linda Fletcher CMA      "

## 2017-09-18 NOTE — MR AVS SNAPSHOT
After Visit Summary   2017    An Cornejo    MRN: [id number]           Patient Information     Date Of Birth          1970        Visit Information        Provider Department      2017 6:25 PM Walter Wasserman MD [facility]        Today's Diagnoses     Cough    -  1    Vaginitis and vulvovaginitis          Care Instructions      Bacterial Vaginosis    You have a vaginal infection called bacterial vaginosis (BV). Both good and bad bacteria are present in a healthy vagina. BV occurs when these bacteria get out of balance. The number of bad bacteria increase. And the number of good bacteria decrease.  BV may or may not cause symptoms. If symptoms do occur, they can include:    Thin, gray, milky-white, or sometimes green discharge    Unpleasant odor or  fishy  smell    Itching, burning, or pain in or around the vagina  It is not known what causes BV, but certain factors can make the problem more likely. This can include:    Douching    Having sex with a new partner    Having sex with more than one partner  BV will sometimes go away on its own. But treatment is usually recommended. This is because untreated BV can increase the risk of more serious health problems such as:    Pelvic inflammatory disease (PID)     delivery (giving birth to a baby early if you re pregnant)    HIV and certain other sexually transmitted diseases (STDs)    Infection after surgery on the reproductive organs  Home care  General care    BV is most often treated with medicines called antibiotics. These may be given as pills or as a vaginal cream. If antibiotics are prescribed, be sure to use them exactly as directed. Also, be sure to complete all of the medicine, even if your symptoms go away.    Avoid douching or having sex during treatment.    If you have sex with a female partner, ask your healthcare provider if she should also be treated.  Prevention    Limit or avoid  douching.    Avoid having sex. If you do have sex, then take steps to lower your risk:    Use condoms when having sex.    Limit the number of partners you have sex with.  Follow-up care  Follow up with your healthcare provider, or as advised.  When to seek medical advice  Call your healthcare provider right away if:    You have a fever of 100.4 F (38 C) or higher, or as directed by your provider.    Your symptoms worsen, or they don t go away within a few days of starting treatment.    You have new pain in the lower belly or pelvic region.    You have side effects that bother you or a reaction to the pills or cream you re prescribed.    You or any partners you have sex with have new symptoms, such as a rash, joint pain, or sores.  Date Last Reviewed: 7/30/2015 2000-2017 [employer]. [address] [place] [address]. All rights reserved. This information is not intended as a substitute for professional medical care. Always follow your healthcare professional's instructions.        Cough, Chronic, Uncertain Cause (Adult)    Everyone has had a cough as part of the common cold, flu, or bronchitis. This kind of cough occurs along with an achy feeling, low-grade fever, nasal and sinus congestion, and a scratchy or sore throat. This usually gets better in 2 to 3 weeks. A cough that lasts longer than 3 weeks may be due to other causes.  If your cough does not improve over the next 2 weeks, further testing may be needed. Follow up with your healthcare provider as advised. Cough suppressants may be recommended. Based on your exam today, the exact cause of your cough is not certain. Below are some common causes for persistent cough.  Smokers cough  Smoker s cough doesn t go away. If you continue to smoke, it only gets worse. The cough is from irritation in the air passages. Talk to your healthcare provider about quitting. Medicines or nicotine-replacement products, like gum or the patch, may make  quitting easier.  Postnasal drip  A cough that is worse at night may be due to postnasal drip. Excess mucus in the nose drains from the back of your nose to your throat. This triggers the cough reflex. Postnasal drip may be due to a sinus infection or allergy. Common allergens include dust, tobacco smoke (both inhaled and secondhand smoke), environmental pollutants, pollen, mold, pets, cleaning agents, room deodorizers, and chemical fumes. Over-the-counter antihistamines or decongestants may be helpful for allergies. A sinus infection may requires antibiotic treatment. See your healthcare provider if symptoms continue.  Medicines  Certain prescribed medicines can cause a chronic cough in some people:    ACE inhibitors for high blood pressure. These include benazepril, captopril, enalapril, fosinopril, lisinopril, quinapril, ramipril, and others.    Beta-blockers for high blood pressure and other conditions. These include propranolol, atenolol, metoprolol, nadolol, and others.  Let your healthcare provider know if you are taking any of these.  Asthma  Cough may be the only sign of mild asthma. You may have tests to find out if asthma is causing your cough. You may also take asthma medicine on a trial basis.  Acid reflux (heartburn, GERD)  The esophagus is the tube that carries food from the mouth to the stomach. A valve at its lower end prevents stomach acids from flowing upward. If this valve does not work properly, acid from the stomach enters the esophagus. This may cause a burning pain in the upper abdomen or lower chest, belching, or cough. Symptoms are often worse when lying flat. Avoid eating or drinking before bedtime. Try using extra pillows to raise your upper body, or place 4-inch blocks under the head of your bed. You may try an over-the-counter antacid or an acid-blocking medicine such as famotidine, cimetidine, ranitidine, esomeprazole, lansoprazole, or omeprazole. Stronger medicines for this condition  can be prescribed by your healthcare provider.  Follow-up care  Follow up with your healthcare provider, or as advised, if your cough does not improve. Further testing may be needed.  Note: If an X-ray was taken, a specialist will review it. You will be notified of any new findings that may affect your care.  When to seek medical advice  Call your healthcare provider right away if any of these occur:    Mild wheezing or difficulty breathing    Fever of 100.4 F (38 C) or higher, or as directed by your healthcare provider    Unexpected weight loss    Coughing up large amounts of colored sputum    Night sweats (sheets and pajamas get soaking wet)  Call 911, or get immediate medical care  Contact emergency services right away if any of these occur:    Coughing up blood    Moderate to severe trouble breathing or wheezing  Date Last Reviewed: 9/13/2015 2000-2017 [employer]. [address] [place] [address]. All rights reserved. This information is not intended as a substitute for professional medical care. Always follow your healthcare professional's instructions.                Follow-ups after your visit        Who to contact     If you have questions or need follow up information about today's clinic visit or your schedule please contact [facility] directly at [phone].  Normal or non-critical lab and imaging results will be communicated to you by MyChart, letter or phone within 4 business days after the clinic has received the results. If you do not hear from us within 7 days, please contact the clinic through MyChart or phone. If you have a critical or abnormal lab result, we will notify you by phone as soon as possible.  Submit refill requests through [employer] or call your pharmacy and they will forward the refill request to us. Please allow 3 business days for your refill to be completed.          Additional Information About Your Visit        MyChart Information      [employer] gives you secure access to your electronic health record. If you see a primary care provider, you can also send messages to your care team and make appointments. If you have questions, please call your primary care clinic.  If you do not have a primary care provider, please call [phone] and they will assist you.        Care EveryWhere ID     This is your Care EveryWhere ID. This could be used by other organizations to access your [place] medical records  [id number]        Your Vitals Were     Pulse Temperature Pulse Oximetry BMI (Body Mass Index)          65 97.9  F (36.6  C) (Oral) 99% 20.71 kg/m2         Blood Pressure from Last 3 Encounters:   09/18/17 115/77   06/14/17 104/75   04/26/17 110/70    Weight from Last 3 Encounters:   09/18/17 133 lb 3.2 oz (60.4 kg)   06/14/17 132 lb 9.6 oz (60.1 kg)   05/24/17 134 lb 3.2 oz (60.9 kg)              Today, you had the following     No orders found for display         Today's Medication Changes          These changes are accurate as of: 9/18/17  7:01 PM.  If you have any questions, ask your nurse or doctor.               Start taking these medicines.        Dose/Directions    azithromycin 250 MG tablet   Commonly known as:  ZITHROMAX   Used for:  Cough        Two tablets first day, then one tablet daily for four days.   Quantity:  6 tablet   Refills:  0       metroNIDAZOLE 750 MG Tb24   Commonly known as:  FLAGYL ER   Used for:  Vaginitis and vulvovaginitis        Dose:  1 tablet   Take 1 tablet by mouth daily   Quantity:  7 tablet   Refills:  0         These medicines have changed or have updated prescriptions.        Dose/Directions    ALPRAZolam 0.25 MG tablet   Commonly known as:  XANAX   This may have changed:  additional instructions   Used for:  Anxiety        Dose:  0.25 mg   Take 1 tablet (0.25 mg) by mouth 3 times daily as needed for sleep or anxiety   Quantity:  30 tablet   Refills:  0            Where to get your medicines      These  medications were sent to [facility]/pharmacy #[id number] - [place], [place] - [address],  [address] [facility]  [address], , [facility] [address]     Phone:  [phone]     azithromycin 250 MG tablet    metroNIDAZOLE 750 MG Tb24                Primary Care Provider Office Phone # Fax #    Melania Daphney Vega [phone] [address] [facility] [address]        Equal Access to Services     FUAD CARREON : Hadii aad ku hadasho Soomaali, waaxda luqadaha, qaybta kaalmada adeegyada, waxay idiin hayaan adeeg zhao major . So [facility] [phone].    ATENCIÓN: Si habla español, tiene a mejía disposición servicios gratuitos de asistencia lingüística. [facility] [phone].    We comply with applicable federal civil rights laws and [place] laws. We do not discriminate on the basis of race, color, national origin, age, disability sex, sexual orientation or gender identity.            Thank you!     Thank you for choosing [facility]  for your care. Our goal is always to provide you with excellent care. Hearing back from our patients is one way we can continue to improve our services. Please take a few minutes to complete the written survey that you may receive in the mail after your visit with us. Thank you!             Your Updated Medication List - Protect others around you: Learn how to safely use, store and throw away your medicines at www.disposemymeds.org.          This list is accurate as of: 9/18/17  7:01 PM.  Always use your most recent med list.                   Brand Name Dispense Instructions for use Diagnosis    albuterol 108 (90 BASE) MCG/ACT Inhaler    PROAIR HFA/PROVENTIL HFA/VENTOLIN HFA    1 Inhaler    Inhale 2 puffs into the lungs every 4 hours as needed for shortness of breath / dyspnea    Mild persistent asthma with exacerbation       ALPRAZolam 0.25 MG tablet    XANAX    30 tablet    Take 1 tablet (0.25 mg) by mouth 3 times daily as needed  for sleep or anxiety    Anxiety       azithromycin 250 MG tablet    ZITHROMAX    6 tablet    Two tablets first day, then one tablet daily for four days.    Cough       cyclobenzaprine 10 MG tablet    FLEXERIL    14 tablet    Take 1 tablet (10 mg) by mouth nightly as needed for muscle spasms    Cervical radiculopathy       dicyclomine 20 MG tablet    BENTYL    40 tablet    Take 1 tablet (20 mg) by mouth 4 times daily as needed    Abdominal cramps       fluticasone 50 MCG/ACT spray    FLONASE    16 mL    2 SPRAYS EACH NOSTRIL TWICE DAILY, FOR 1 WEEK, THEN 2 SPRAYS EACH NOSTRIL 1 TIME DAILY    Seasonal allergic rhinitis       ipratropium 0.06 % spray    ATROVENT    1 Box    Spray 2 sprays into both nostrils 4 times daily as needed    Rhinorrhea       metroNIDAZOLE 750 MG Tb24    FLAGYL ER    7 tablet    Take 1 tablet by mouth daily    Vaginitis and vulvovaginitis       MIRENA (52 MG) 20 MCG/24HR IUD   Generic drug:  levonorgestrel      1 each by Intrauterine route once Reported on 4/14/2017        rizatriptan 10 MG tablet    MAXALT    18 tablet    Take 1 tablet (10 mg) by mouth at onset of headache for migraine May repeat in 2 hours. Max 3 tablets/24 hours.    Migraine without aura and without status migrainosus, not intractable       senna 8.6 MG tablet    SENOKOT    60 tablet    Take 1 tablet by mouth daily    Diarrhea, unspecified type       verapamil 40 MG tablet    CALAN    270 tablet    Week 1: 40 mg at bed time. Week 2: 40 mg two times a day. Week 3 & afterwards: 40 mg three times    Vestibular migraine

## 2017-09-18 NOTE — PROGRESS NOTES
"SUBJECTIVE:   An Cornejo is a 47 year old female presenting with a chief complaint of nasal congestion, rhinorrhea, cough  and sore throat.  Onset of symptoms was 7 day(s) ago.  Course of illness is worsening.    Severity moderate  Current and Associated symptoms: nasal congestion, rhinorrhea, cough  and sore throat  Treatment measures tried include OTC meds.  Predisposing factors include exposure to sick relatives.    Also c/o poss vaginal infection -- foul smelling.  Had this before and was lasted treated this spring with Flagyl with good success.    Past Medical History:   Diagnosis Date     Anxiety     Use medication as needed     Asthma     Uses inhaler as needed     Episodic tension type headache     Started when she was in her 30's intermittent     IUD     Mirena inserted 6/5/13     Neck pain     Both side of neck     Papanicolaou smear of cervix with low grade squamous intraepithelial lesion (LGSIL) 10/31/16    + HR HPV 16 & other     Seizures (H) infancy    \"grew out of it\"     Status post colposcopy 11/28/16    ECC - negative     Upper back pain     Over 5 years or more     Current Outpatient Prescriptions   Medication Sig Dispense Refill     ipratropium (ATROVENT) 0.06 % spray Spray 2 sprays into both nostrils 4 times daily as needed 1 Box 11     fluticasone (FLONASE) 50 MCG/ACT spray 2 SPRAYS EACH NOSTRIL TWICE DAILY, FOR 1 WEEK, THEN 2 SPRAYS EACH NOSTRIL 1 TIME DAILY 16 mL 1     verapamil (CALAN) 40 MG tablet Week 1: 40 mg at bed time. Week 2: 40 mg two times a day. Week 3 & afterwards: 40 mg three times 270 tablet 3     rizatriptan (MAXALT) 10 MG tablet Take 1 tablet (10 mg) by mouth at onset of headache for migraine May repeat in 2 hours. Max 3 tablets/24 hours. 18 tablet 1     albuterol (PROAIR HFA/PROVENTIL HFA/VENTOLIN HFA) 108 (90 BASE) MCG/ACT Inhaler Inhale 2 puffs into the lungs every 4 hours as needed for shortness of breath / dyspnea 1 Inhaler 2     dicyclomine (BENTYL) 20 MG tablet Take " 1 tablet (20 mg) by mouth 4 times daily as needed 40 tablet 1     ALPRAZolam (XANAX) 0.25 MG tablet Take 1 tablet (0.25 mg) by mouth 3 times daily as needed for sleep or anxiety (Patient taking differently: Take 0.25 mg by mouth 3 times daily as needed for sleep or anxiety Per patient she says if she needs this medication she takes 2 tablets at a time) 30 tablet 0     cyclobenzaprine (FLEXERIL) 10 MG tablet Take 1 tablet (10 mg) by mouth nightly as needed for muscle spasms 14 tablet 1     levonorgestrel (MIRENA) 20 MCG/24HR IUD 1 each by Intrauterine route once Reported on 4/14/2017       senna (SENOKOT) 8.6 MG tablet Take 1 tablet by mouth daily (Patient not taking: Reported on 6/14/2017) 60 tablet 1     Social History   Substance Use Topics     Smoking status: Never Smoker     Smokeless tobacco: Never Used      Comment: nonsmoking household     Alcohol use 0.0 oz/week     0 Standard drinks or equivalent per week      Comment: 2 glasses of gin and tonic may be once per month       ROS:  Review of systems negative except as stated above.    OBJECTIVE  :/77  Pulse 65  Temp 97.9  F (36.6  C) (Oral)  Wt 133 lb 3.2 oz (60.4 kg)  SpO2 99%  BMI 20.71 kg/m2  GENERAL APPEARANCE: healthy, alert and no distress  EYES: EOMI,  PERRL, conjunctiva clear  HENT: ear canals and TM's normal.  Nose and mouth without ulcers, erythema or lesions  NECK: supple, nontender, no lymphadenopathy  RESP: lungs clear to auscultation - no rales, rhonchi or wheezes  CV: regular rates and rhythm, normal S1 S2, no murmur noted  NEURO: Normal strength and tone, sensory exam grossly normal,  normal speech and mentation  SKIN: no suspicious lesions or rashes    ASSESSMENT:  Cough  Vaginitis    PLAN:  Rx Z-chavo and Flagyl  See orders in Epic

## 2017-09-19 NOTE — TELEPHONE ENCOUNTER
"  Reason for Disposition    Pharmacy calling with prescription questions and triager unable to answer question    Additional Information    Negative: Drug overdose and nurse unable to answer question    Negative: Caller requesting information not related to medicine    Negative: Caller requesting a prescription for Strep throat and has a positive culture result    Negative: Rash while taking a medication or within 3 days of stopping it    Negative: Immunization reaction suspected    Negative: [1] Asthma and [2] having symptoms of asthma (cough, wheezing, etc)    Negative: MORE THAN A DOUBLE DOSE of a prescription or over-the-counter (OTC) drug    Negative: [1] DOUBLE DOSE (an extra dose or lesser amount) of over-the-counter (OTC) drug AND [2] any symptoms (e.g., dizziness, nausea, pain, sleepiness)    Negative: [1] DOUBLE DOSE (an extra dose or lesser amount) of prescription drug AND [2] any symptoms (e.g., dizziness, nausea, pain, sleepiness)    Negative: Took another person's prescription drug    Negative: [1] DOUBLE DOSE (an extra dose or lesser amount) of prescription drug AND [2] NO symptoms (Exception: a double dose of antibiotics)    Negative: Diabetes drug error or overdose (e.g., insulin or extra dose)    Negative: [1] Request for URGENT new prescription or refill of \"essential\" medication (i.e., likelihood of harm to patient if not taken) AND [2] triager unable to fill per unit policy    Negative: [1] Prescription not at pharmacy AND [2] was prescribed today by PCP    Protocols used: MEDICATION QUESTION CALL-ADULT-    Patient's pharmacy calling to clarify ordered Flagyl.  Transferred pharmacist to [facility] back line to obtain order clarification.    Hoa Plascencia RN  [facility]    "

## 2017-09-19 NOTE — PATIENT INSTRUCTIONS
Bacterial Vaginosis    You have a vaginal infection called bacterial vaginosis (BV). Both good and bad bacteria are present in a healthy vagina. BV occurs when these bacteria get out of balance. The number of bad bacteria increase. And the number of good bacteria decrease.  BV may or may not cause symptoms. If symptoms do occur, they can include:    Thin, gray, milky-white, or sometimes green discharge    Unpleasant odor or  fishy  smell    Itching, burning, or pain in or around the vagina  It is not known what causes BV, but certain factors can make the problem more likely. This can include:    Douching    Having sex with a new partner    Having sex with more than one partner  BV will sometimes go away on its own. But treatment is usually recommended. This is because untreated BV can increase the risk of more serious health problems such as:    Pelvic inflammatory disease (PID)     delivery (giving birth to a baby early if you re pregnant)    HIV and certain other sexually transmitted diseases (STDs)    Infection after surgery on the reproductive organs  Home care  General care    BV is most often treated with medicines called antibiotics. These may be given as pills or as a vaginal cream. If antibiotics are prescribed, be sure to use them exactly as directed. Also, be sure to complete all of the medicine, even if your symptoms go away.    Avoid douching or having sex during treatment.    If you have sex with a female partner, ask your healthcare provider if she should also be treated.  Prevention    Limit or avoid douching.    Avoid having sex. If you do have sex, then take steps to lower your risk:    Use condoms when having sex.    Limit the number of partners you have sex with.  Follow-up care  Follow up with your healthcare provider, or as advised.  When to seek medical advice  Call your healthcare provider right away if:    You have a fever of 100.4 F (38 C) or higher, or as directed by your  provider.    Your symptoms worsen, or they don t go away within a few days of starting treatment.    You have new pain in the lower belly or pelvic region.    You have side effects that bother you or a reaction to the pills or cream you re prescribed.    You or any partners you have sex with have new symptoms, such as a rash, joint pain, or sores.  Date Last Reviewed: 7/30/2015 2000-2017 [employer]. [address] [place] [address], [place] [address]. All rights reserved. This information is not intended as a substitute for professional medical care. Always follow your healthcare professional's instructions.        Cough, Chronic, Uncertain Cause (Adult)    Everyone has had a cough as part of the common cold, flu, or bronchitis. This kind of cough occurs along with an achy feeling, low-grade fever, nasal and sinus congestion, and a scratchy or sore throat. This usually gets better in 2 to 3 weeks. A cough that lasts longer than 3 weeks may be due to other causes.  If your cough does not improve over the next 2 weeks, further testing may be needed. Follow up with your healthcare provider as advised. Cough suppressants may be recommended. Based on your exam today, the exact cause of your cough is not certain. Below are some common causes for persistent cough.  Smokers cough  Smoker s cough doesn t go away. If you continue to smoke, it only gets worse. The cough is from irritation in the air passages. Talk to your healthcare provider about quitting. Medicines or nicotine-replacement products, like gum or the patch, may make quitting easier.  Postnasal drip  A cough that is worse at night may be due to postnasal drip. Excess mucus in the nose drains from the back of your nose to your throat. This triggers the cough reflex. Postnasal drip may be due to a sinus infection or allergy. Common allergens include dust, tobacco smoke (both inhaled and secondhand smoke), environmental pollutants, pollen, mold, pets,  cleaning agents, room deodorizers, and chemical fumes. Over-the-counter antihistamines or decongestants may be helpful for allergies. A sinus infection may requires antibiotic treatment. See your healthcare provider if symptoms continue.  Medicines  Certain prescribed medicines can cause a chronic cough in some people:    ACE inhibitors for high blood pressure. These include benazepril, captopril, enalapril, fosinopril, lisinopril, quinapril, ramipril, and others.    Beta-blockers for high blood pressure and other conditions. These include propranolol, atenolol, metoprolol, nadolol, and others.  Let your healthcare provider know if you are taking any of these.  Asthma  Cough may be the only sign of mild asthma. You may have tests to find out if asthma is causing your cough. You may also take asthma medicine on a trial basis.  Acid reflux (heartburn, GERD)  The esophagus is the tube that carries food from the mouth to the stomach. A valve at its lower end prevents stomach acids from flowing upward. If this valve does not work properly, acid from the stomach enters the esophagus. This may cause a burning pain in the upper abdomen or lower chest, belching, or cough. Symptoms are often worse when lying flat. Avoid eating or drinking before bedtime. Try using extra pillows to raise your upper body, or place 4-inch blocks under the head of your bed. You may try an over-the-counter antacid or an acid-blocking medicine such as famotidine, cimetidine, ranitidine, esomeprazole, lansoprazole, or omeprazole. Stronger medicines for this condition can be prescribed by your healthcare provider.  Follow-up care  Follow up with your healthcare provider, or as advised, if your cough does not improve. Further testing may be needed.  Note: If an X-ray was taken, a specialist will review it. You will be notified of any new findings that may affect your care.  When to seek medical advice  Call your healthcare provider right away if any  of these occur:    Mild wheezing or difficulty breathing    Fever of 100.4 F (38 C) or higher, or as directed by your healthcare provider    Unexpected weight loss    Coughing up large amounts of colored sputum    Night sweats (sheets and pajamas get soaking wet)  Call 911, or get immediate medical care  Contact emergency services right away if any of these occur:    Coughing up blood    Moderate to severe trouble breathing or wheezing  Date Last Reviewed: 9/13/2015 2000-2017 [employer]. [address] [place] [address]. All rights reserved. This information is not intended as a substitute for professional medical care. Always follow your healthcare professional's instructions.

## 2017-12-06 ENCOUNTER — OFFICE VISIT (OUTPATIENT)
Dept: OBGYN | Facility: CLINIC | Age: 47
End: 2017-12-06
Payer: COMMERCIAL

## 2017-12-06 VITALS
TEMPERATURE: 97.5 F | HEIGHT: 67 IN | WEIGHT: 138 LBS | DIASTOLIC BLOOD PRESSURE: 66 MMHG | HEART RATE: 77 BPM | BODY MASS INDEX: 21.66 KG/M2 | SYSTOLIC BLOOD PRESSURE: 104 MMHG

## 2017-12-06 DIAGNOSIS — K59.00 CONSTIPATION, UNSPECIFIED CONSTIPATION TYPE: ICD-10-CM

## 2017-12-06 DIAGNOSIS — G43.009 MIGRAINE WITHOUT AURA AND WITHOUT STATUS MIGRAINOSUS, NOT INTRACTABLE: ICD-10-CM

## 2017-12-06 DIAGNOSIS — Z01.419 ENCOUNTER FOR GYNECOLOGICAL EXAMINATION WITHOUT ABNORMAL FINDING: Primary | ICD-10-CM

## 2017-12-06 DIAGNOSIS — M54.12 CERVICAL RADICULOPATHY: ICD-10-CM

## 2017-12-06 DIAGNOSIS — R87.612 PAPANICOLAOU SMEAR OF CERVIX WITH LOW GRADE SQUAMOUS INTRAEPITHELIAL LESION (LGSIL): ICD-10-CM

## 2017-12-06 PROCEDURE — 88141 CYTOPATH C/V INTERPRET: CPT | Performed by: NURSE PRACTITIONER

## 2017-12-06 PROCEDURE — 87624 HPV HI-RISK TYP POOLED RSLT: CPT | Performed by: NURSE PRACTITIONER

## 2017-12-06 PROCEDURE — 88175 CYTOPATH C/V AUTO FLUID REDO: CPT | Performed by: NURSE PRACTITIONER

## 2017-12-06 PROCEDURE — 99396 PREV VISIT EST AGE 40-64: CPT | Performed by: NURSE PRACTITIONER

## 2017-12-06 RX ORDER — RIZATRIPTAN BENZOATE 10 MG/1
10 TABLET ORAL
Qty: 18 TABLET | Refills: 1 | Status: SHIPPED | OUTPATIENT
Start: 2017-12-06 | End: 2018-09-28

## 2017-12-06 RX ORDER — CYCLOBENZAPRINE HCL 10 MG
10 TABLET ORAL
Qty: 30 TABLET | Refills: 1 | Status: SHIPPED | OUTPATIENT
Start: 2017-12-06 | End: 2018-09-28

## 2017-12-06 ASSESSMENT — PAIN SCALES - GENERAL: PAINLEVEL: NO PAIN (0)
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
## 2017-12-06 NOTE — MR AVS SNAPSHOT
After Visit Summary   12/6/2017    An Cornejo    MRN: [id number]           Patient Information     Date Of Birth          1970        Visit Information        Provider Department      12/6/2017 8:50 AM Genoveva Gaviria APRN CNP [facility]        Today's Diagnoses     Encounter for gynecological examination without abnormal finding    -  1    Papanicolaou smear of cervix with low grade squamous intraepithelial lesion (LGSIL)        Constipation, unspecified constipation type           Follow-ups after your visit        Additional Services     GASTROENTEROLOGY ADULT REF CONSULT ONLY       Preferred Location: [facility], [facility]: [phone]      Please be aware that coverage of these services is subject to the terms and limitations of your health insurance plan.  Call member services at your health plan with any benefit or coverage questions.  Any procedures must be performed at a [place] facility OR coordinated by your clinic's referral office.    Please bring the following with you to your appointment:    (1) Any X-Rays, CTs or MRIs which have been performed.  Contact the facility where they were done to arrange for  prior to your scheduled appointment.    (2) List of current medications   (3) This referral request   (4) Any documents/labs given to you for this referral                  Your next 10 appointments already scheduled     Dec 19, 2017  8:55 AM CST   Office Visit with Melania Vega MD   [facility] ([facility])    [address] [address] [phone]           Bring a current list of meds and any records pertaining to this visit. For Physicals, please bring immunization records and any forms needing to be filled out. Please arrive 10 minutes early to complete paperwork.              Who to contact     If you have questions or need follow up information about today's clinic visit or your  "schedule please contact [facility] directly at [phone].  Normal or non-critical lab and imaging results will be communicated to you by MyChart, letter or phone within 4 business days after the clinic has received the results. If you do not hear from us within 7 days, please contact the clinic through [employer] or phone. If you have a critical or abnormal lab result, we will notify you by phone as soon as possible.  Submit refill requests through [employer] or call your pharmacy and they will forward the refill request to us. Please allow 3 business days for your refill to be completed.          Additional Information About Your Visit        [employer] Information     [employer] gives you secure access to your electronic health record. If you see a primary care provider, you can also send messages to your care team and make appointments. If you have questions, please call your primary care clinic.  If you do not have a primary care provider, please call [phone] and they will assist you.        Care EveryWhere ID     This is your Care EveryWhere ID. This could be used by other organizations to access your [place] medical records  [id number]        Your Vitals Were     Pulse Temperature Height BMI (Body Mass Index)          77 97.5  F (36.4  C) (Oral) 5' 7.25\" (1.708 m) 21.45 kg/m2         Blood Pressure from Last 3 Encounters:   12/06/17 104/66   09/18/17 115/77   06/14/17 104/75    Weight from Last 3 Encounters:   12/06/17 138 lb (62.6 kg)   09/18/17 133 lb 3.2 oz (60.4 kg)   06/14/17 132 lb 9.6 oz (60.1 kg)              We Performed the Following     GASTROENTEROLOGY ADULT REF CONSULT ONLY     HPV High Risk Types DNA Cervical     Pap imaged thin layer diagnostic only          Today's Medication Changes          These changes are accurate as of: 12/6/17  9:55 AM.  If you have any questions, ask your nurse or doctor.               These medicines have changed or have updated prescriptions.        " Dose/Directions    ALPRAZolam 0.25 MG tablet   Commonly known as:  XANAX   This may have changed:  additional instructions   Used for:  Anxiety        Dose:  0.25 mg   Take 1 tablet (0.25 mg) by mouth 3 times daily as needed for sleep or anxiety   Quantity:  30 tablet   Refills:  0         Stop taking these medicines if you haven't already. Please contact your care team if you have questions.     azithromycin 250 MG tablet   Commonly known as:  ZITHROMAX   Stopped by:  Genoveva Gaviria APRN CNP           senna 8.6 MG tablet   Commonly known as:  SENOKOT   Stopped by:  Genoveva Gaviria APRN CNP                    Primary Care Provider Office Phone # Fax #    Melania Daphney Vega [phone] [address] [facility] [address]        Equal Access to Services     [facility]: Hadii lamont carbajal hadasho Soomaali, waaxda luqadaha, qaybta kaalmada adeegyada, waxroberta major . So [place] [phone].    ATENCIÓN: Si habla español, tiene a mejía disposición servicios gratuitos de asistencia lingüística. Geeta al [phone].    We comply with applicable federal civil rights laws and [place] laws. We do not discriminate on the basis of race, color, national origin, age, disability, sex, sexual orientation, or gender identity.            Thank you!     Thank you for choosing [facility]  for your care. Our goal is always to provide you with excellent care. Hearing back from our patients is one way we can continue to improve our services. Please take a few minutes to complete the written survey that you may receive in the mail after your visit with us. Thank you!             Your Updated Medication List - Protect others around you: Learn how to safely use, store and throw away your medicines at www.disposemymeds.org.          This list is accurate as of: 12/6/17  9:55 AM.  Always use your most recent med list.                   Brand Name Dispense  Instructions for use Diagnosis    albuterol 108 (90 BASE) MCG/ACT Inhaler    PROAIR HFA/PROVENTIL HFA/VENTOLIN HFA    1 Inhaler    Inhale 2 puffs into the lungs every 4 hours as needed for shortness of breath / dyspnea    Mild persistent asthma with exacerbation       ALPRAZolam 0.25 MG tablet    XANAX    30 tablet    Take 1 tablet (0.25 mg) by mouth 3 times daily as needed for sleep or anxiety    Anxiety       cyclobenzaprine 10 MG tablet    FLEXERIL    14 tablet    Take 1 tablet (10 mg) by mouth nightly as needed for muscle spasms    Cervical radiculopathy       dicyclomine 20 MG tablet    BENTYL    40 tablet    Take 1 tablet (20 mg) by mouth 4 times daily as needed    Abdominal cramps       fluticasone 50 MCG/ACT spray    FLONASE    16 mL    2 SPRAYS EACH NOSTRIL TWICE DAILY, FOR 1 WEEK, THEN 2 SPRAYS EACH NOSTRIL 1 TIME DAILY    Seasonal allergic rhinitis       ipratropium 0.06 % spray    ATROVENT    1 Box    Spray 2 sprays into both nostrils 4 times daily as needed    Rhinorrhea       metroNIDAZOLE 750 MG Tb24    FLAGYL ER    7 tablet    Take 1 tablet by mouth daily    Vaginitis and vulvovaginitis       MIRENA (52 MG) 20 MCG/24HR IUD   Generic drug:  levonorgestrel      1 each by Intrauterine route once Reported on 4/14/2017        rizatriptan 10 MG tablet    MAXALT    18 tablet    Take 1 tablet (10 mg) by mouth at onset of headache for migraine May repeat in 2 hours. Max 3 tablets/24 hours.    Migraine without aura and without status migrainosus, not intractable       verapamil 40 MG tablet    CALAN    270 tablet    Week 1: 40 mg at bed time. Week 2: 40 mg two times a day. Week 3 & afterwards: 40 mg three times    Vestibular migraine

## 2017-12-06 NOTE — NURSING NOTE
"Chief Complaint   Patient presents with     Physical       Initial /66  Pulse 77  Temp 97.5  F (36.4  C) (Oral)  Ht 5' 7.25\" (1.708 m)  Wt 138 lb (62.6 kg)  BMI 21.45 kg/m2 Estimated body mass index is 21.45 kg/(m^2) as calculated from the following:    Height as of this encounter: 5' 7.25\" (1.708 m).    Weight as of this encounter: 138 lb (62.6 kg)..  BP completed using cuff size: tyrell Nascimento CMA    "

## 2017-12-06 NOTE — TELEPHONE ENCOUNTER
Routing refill request to provider for review/approval because:  Drug not on the [facility] refill protocol   Cassie Saleh RN

## 2017-12-11 LAB
COPATH REPORT: ABNORMAL
PAP: ABNORMAL

## 2017-12-12 LAB
FINAL DIAGNOSIS: ABNORMAL
HPV HR 12 DNA CVX QL NAA+PROBE: POSITIVE
HPV16 DNA SPEC QL NAA+PROBE: POSITIVE
HPV18 DNA SPEC QL NAA+PROBE: NEGATIVE
SPECIMEN DESCRIPTION: ABNORMAL

## 2017-12-28 ENCOUNTER — TRANSFERRED RECORDS (OUTPATIENT)
Dept: HEALTH INFORMATION MANAGEMENT | Facility: CLINIC | Age: 47
End: 2017-12-28

## 2018-01-11 ENCOUNTER — TELEPHONE (OUTPATIENT)
Dept: FAMILY MEDICINE | Facility: CLINIC | Age: 48
End: 2018-01-11

## 2018-01-11 NOTE — TELEPHONE ENCOUNTER
[facility] Call Center    Phone Message    Name of Caller: An    Phone Number: [phone]      Best time to return call: any    May a detailed message be left on voicemail: yes    Relation to patient: Self    Reason for Call: Other: Colposcopy.  Patient has had this procedure done by her pcp Melania Vega in the past.  States she has had all the tests done already, and would like it done with an ob/gyn provider this time.  Please contact patient about scheduling.     Action Taken: Message routed to:  [facility] p [phone]       Thank you,    Florina Owens  Primary Care   [place]

## 2018-01-17 ENCOUNTER — OFFICE VISIT (OUTPATIENT)
Dept: OBGYN | Facility: CLINIC | Age: 48
End: 2018-01-17
Payer: COMMERCIAL

## 2018-01-17 VITALS
OXYGEN SATURATION: 97 % | HEART RATE: 72 BPM | TEMPERATURE: 98 F | SYSTOLIC BLOOD PRESSURE: 104 MMHG | WEIGHT: 140.8 LBS | BODY MASS INDEX: 21.89 KG/M2 | DIASTOLIC BLOOD PRESSURE: 72 MMHG

## 2018-01-17 DIAGNOSIS — R87.612 PAPANICOLAOU SMEAR OF CERVIX WITH LOW GRADE SQUAMOUS INTRAEPITHELIAL LESION (LGSIL): Primary | ICD-10-CM

## 2018-01-17 PROCEDURE — 57455 BIOPSY OF CERVIX W/SCOPE: CPT | Performed by: OBSTETRICS & GYNECOLOGY

## 2018-01-17 PROCEDURE — 99212 OFFICE O/P EST SF 10 MIN: CPT | Mod: 25 | Performed by: OBSTETRICS & GYNECOLOGY

## 2018-01-17 NOTE — NURSING NOTE
"Chief Complaint   Patient presents with     Colposcopy     LSIL, HPV+16       Initial /72  Pulse 72  Temp 98  F (36.7  C) (Oral)  Wt 63.9 kg (140 lb 12.8 oz)  LMP 01/10/2018  SpO2 97%  BMI 21.89 kg/m2 Estimated body mass index is 21.89 kg/(m^2) as calculated from the following:    Height as of 12/6/17: 1.708 m (5' 7.25\").    Weight as of this encounter: 63.9 kg (140 lb 12.8 oz).  Medication Reconciliation: complete   Phyllis Red, EDELMIRA      "

## 2018-01-17 NOTE — MR AVS SNAPSHOT
After Visit Summary   1/17/2018    An Cornejo    MRN: [id number]           Patient Information     Date Of Birth          1970        Visit Information        Provider Department      1/17/2018 1:15 PM Stan Del Angel MD; PROC RM 1 [facility]        Today's Diagnoses     Papanicolaou smear of cervix with low grade squamous intraepithelial lesion (LGSIL)    -  1      Care Instructions                                                         If you have any questions regarding your visit, Please contact your care team.    [facility] CLINIC HOURS TELEPHONE NUMBER   MD Phyllis Gan CMA Lisa -    EVENS Lino RN       Monday:       7:30-4:30 [place]  Wednesday:       7:30-4:30 [place]  Thursday:       7:30-1:30 [place]  Friday:       7:30-11:30 [facility]  [address] [place]  [address]  [phone] ask for [facility]  [address] [place] [address]  [phone] ask for [facility]    Imaging Scheduling for [place]:  [phone]    Imaging Scheduling for [place]: [phone]       Urgent Care locations:    [facility] Saturday and Sunday   9 am - 5 pm    Monday-Friday   12 pm - 8 pm  Saturday and Sunday   9 am - 5 pm   [phone]     [facility] Labor and Delivery:  [phone]    If you need a medication refill, please contact your pharmacy. Please allow 3 business days for your refill to be completed.  As always, Thank you for trusting us with your healthcare needs!              Follow-ups after your visit        Who to contact     If you have questions or need follow up information about today's clinic visit or your schedule please contact [facility] directly at [phone].  Normal or non-critical lab and imaging results will be communicated to you by Hattie  letter or phone within 4 business days after the clinic has received the results. If you do not hear from us within 7 days, please contact the clinic through [employer] or phone. If you have a critical or abnormal lab result, we will notify you by phone as soon as possible.  Submit refill requests through [employer] or call your pharmacy and they will forward the refill request to us. Please allow 3 business days for your refill to be completed.          Additional Information About Your Visit        [employer] Information     [employer] gives you secure access to your electronic health record. If you see a primary care provider, you can also send messages to your care team and make appointments. If you have questions, please call your primary care clinic.  If you do not have a primary care provider, please call [phone] and they will assist you.        Care EveryWhere ID     This is your Care EveryWhere ID. This could be used by other organizations to access your [place] medical records  [id number]        Your Vitals Were     Pulse Temperature Last Period Pulse Oximetry BMI (Body Mass Index)       72 98  F (36.7  C) (Oral) 01/10/2018 97% 21.89 kg/m2        Blood Pressure from Last 3 Encounters:   01/17/18 104/72   12/06/17 104/66   09/18/17 115/77    Weight from Last 3 Encounters:   01/17/18 63.9 kg (140 lb 12.8 oz)   12/06/17 62.6 kg (138 lb)   09/18/17 60.4 kg (133 lb 3.2 oz)              We Performed the Following     COLP CERVIX/UPPER VAGINA W BX CERVIX/ENDOCERV CURETT          Today's Medication Changes          These changes are accurate as of: 1/17/18  2:10 PM.  If you have any questions, ask your nurse or doctor.               These medicines have changed or have updated prescriptions.        Dose/Directions    ALPRAZolam 0.25 MG tablet   Commonly known as:  XANAX   This may have changed:  additional instructions   Used for:  Anxiety        Dose:  0.25 mg   Take 1 tablet (0.25 mg) by mouth 3 times daily as  needed for sleep or anxiety   Quantity:  30 tablet   Refills:  0                Primary Care Provider Office Phone # Fax #    Melania Daphney Vega [phone] [address] [facility] [address]        Equal Access to Services     BERNABE CARREON : Hadii lamont ku hadjose martino Soomaali, waaxda luqadaha, qaybta kaalmada adeegyada, geovanny hernandezn adechilo churchill lajonasradha orellana. So [facility] [phone].    ATENCIÓN: Si habla español, tiene a mejía disposición servicios gratuitos de asistencia lingüística. Llame al [phone].    We comply with applicable federal civil rights laws and [place] laws. We do not discriminate on the basis of race, color, national origin, age, disability, sex, sexual orientation, or gender identity.            Thank you!     Thank you for choosing [facility]  for your care. Our goal is always to provide you with excellent care. Hearing back from our patients is one way we can continue to improve our services. Please take a few minutes to complete the written survey that you may receive in the mail after your visit with us. Thank you!             Your Updated Medication List - Protect others around you: Learn how to safely use, store and throw away your medicines at www.disposemymeds.org.          This list is accurate as of: 1/17/18  2:10 PM.  Always use your most recent med list.                   Brand Name Dispense Instructions for use Diagnosis    albuterol 108 (90 BASE) MCG/ACT Inhaler    PROAIR HFA/PROVENTIL HFA/VENTOLIN HFA    1 Inhaler    Inhale 2 puffs into the lungs every 4 hours as needed for shortness of breath / dyspnea    Mild persistent asthma with exacerbation       ALPRAZolam 0.25 MG tablet    XANAX    30 tablet    Take 1 tablet (0.25 mg) by mouth 3 times daily as needed for sleep or anxiety    Anxiety       cyclobenzaprine 10 MG tablet    FLEXERIL    30 tablet    Take 1 tablet (10 mg) by mouth nightly as needed for muscle spasms    Cervical  radiculopathy       dicyclomine 20 MG tablet    BENTYL    40 tablet    Take 1 tablet (20 mg) by mouth 4 times daily as needed    Abdominal cramps       fluticasone 50 MCG/ACT spray    FLONASE    16 mL    2 SPRAYS EACH NOSTRIL TWICE DAILY, FOR 1 WEEK, THEN 2 SPRAYS EACH NOSTRIL 1 TIME DAILY    Seasonal allergic rhinitis       ipratropium 0.06 % spray    ATROVENT    1 Box    Spray 2 sprays into both nostrils 4 times daily as needed    Rhinorrhea       metroNIDAZOLE 750 MG Tb24    FLAGYL ER    7 tablet    Take 1 tablet by mouth daily    Vaginitis and vulvovaginitis       MIRENA (52 MG) 20 MCG/24HR IUD   Generic drug:  levonorgestrel      1 each by Intrauterine route once Reported on 4/14/2017        rizatriptan 10 MG tablet    MAXALT    18 tablet    Take 1 tablet (10 mg) by mouth at onset of headache for migraine May repeat in 2 hours. Max 3 tablets/24 hours.    Migraine without aura and without status migrainosus, not intractable       verapamil 40 MG tablet    CALAN    270 tablet    Week 1: 40 mg at bed time. Week 2: 40 mg two times a day. Week 3 & afterwards: 40 mg three times    Vestibular migraine

## 2018-01-17 NOTE — PROGRESS NOTES
An presents for colposcopy. Pap showed: LSIL HR HPV+ 16.     PMH/PSH/Meds/Allergies reviewed & documented in [facility].    I discussed with the patient the results of her pap smear and/or HPV studies.    I discussed our current understanding of abnormal cytology and the role hpv can play in pre-cancerous cervical change.  I explained the current cytological/histologic terminology.      We discussed the screening nature of pap smears and the need for a more definitive examination.    She is given the opportunity to ask questions and have them answered.  She does agree to proceed with colposcopy.    Procedure for colposcopy and biopsy has been explained to the   patient and consent obtained.    PROCEDURE:  Speculum placed in vagina and excellent visualization of cervix achieved, cervix swabbed  with acetic acid solution.    FINDINGS:  Cervix: no visible lesions, no mosaicism, no punctation and no abnormal vasculature; SCJ visualized 360 degrees without lesions and no biopsies taken.    Vaginal inspection: vaginal colposcopy not performed.    Vulvar colposcopy: vulvar colposcopy not performed.    Procedure Summary: Patient tolerated procedure well and colposcopy adequate.    Colposcopic Impression: HPV effect    10-15 minutes were spent in face to face discussion regarding her pap and HPV status.  This was in addition to the time required for the procedure.     Plan:  Co-testing 1 year.    Stan Del Angel MD FACOG

## 2018-01-17 NOTE — PATIENT INSTRUCTIONS
If you have any questions regarding your visit, Please contact your care team.    [facility] CLINIC HOURS TELEPHONE NUMBER   MD Phyllis Gan CMA Lisa -    EVENS Lino RN       Monday:       7:30-4:30 [place]  Wednesday:       7:30-4:30 [place]  Thursday:       7:30-1:30 [place]  Friday:       7:30-11:30 [facility]  [address] [place]  [address]  [phone] ask for [facility]  [address] [place] [address]  [phone] ask for [facility]    Imaging Scheduling for [place]:  [phone]    Imaging Scheduling for [place]: [phone]       Urgent Care locations:    [facility] Saturday and Sunday   9 am - 5 pm    Monday-Friday   12 pm - 8 pm  Saturday and Sunday   9 am - 5 pm   [phone]     [facility] Labor and Delivery:  [phone]    If you need a medication refill, please contact your pharmacy. Please allow 3 business days for your refill to be completed.  As always, Thank you for trusting us with your healthcare needs!

## 2018-08-16 ENCOUNTER — TELEPHONE (OUTPATIENT)
Dept: FAMILY MEDICINE | Facility: CLINIC | Age: 48
End: 2018-08-16

## 2018-08-16 NOTE — TELEPHONE ENCOUNTER
Unless working directly with animals, no need for rabies.  Unlikely to need typhoid or yellow fever, unless recommended by the travel agency/coordinator for trip.    No need for malaria medication if no malaria in area.    Always a good idea to have Hep a/b vaccines though at this point will not be protective until 2 weeks after vaccination.  Recommend cleaning raw food very well prior to eating to help protect against Hep A.  Hep B risk is higher if exposed to blood/body fluids.

## 2018-08-16 NOTE — TELEPHONE ENCOUNTER
Patient is calling stating will be traveling to [facility] Sunday to [facility]. Has spoken to travel clinic and been on [facility] website and has gotten conflicting information and would like to discuss with provider on her thoughts or suggestions to the situation. Please call to discuss. Thank you.

## 2018-08-16 NOTE — TELEPHONE ENCOUNTER
Patient informed of provider note as below. Patient verbalized understanding    Hoa ROCHAN, RN, CPN

## 2018-08-16 NOTE — TELEPHONE ENCOUNTER
"Called travel clinic and has been reading online and gettingt conflicting info  When reading about the area they will be in, reading that nothing is required, province is malaria free so none needed  But then checked CDC website and recommended some immunizations - routine vaccinations plus Hep A and Typhoid for \"most travelers\", then states for \"some\" travelers also needing Hep B rabies, Yellow Fever, Malaria   Called travel clinic and since patient was leaving Sunday, told can't help her and told to call to [facility] if minute clinic could help patient.   Going [place] and area is malaria free, then read recommended to get rabies if going to be exposed.   Patient is confused what she is needing if anything for the area she will be traveling. Would like advice from pcp    To provider to advise  Hoa ROCHAN, RN, CPN    "

## 2018-09-04 ENCOUNTER — TELEPHONE (OUTPATIENT)
Dept: FAMILY MEDICINE | Facility: CLINIC | Age: 48
End: 2018-09-04

## 2018-09-04 DIAGNOSIS — G43.809 VESTIBULAR MIGRAINE: ICD-10-CM

## 2018-09-04 RX ORDER — VERAPAMIL HYDROCHLORIDE 40 MG/1
TABLET ORAL
Qty: 270 TABLET | Refills: 3 | Status: CANCELLED | OUTPATIENT
Start: 2018-09-04

## 2018-09-04 NOTE — TELEPHONE ENCOUNTER
"Last Rx filled by Dr Fulton who is no longer at [facility].  Sent to pt's PCP      Requested Prescriptions   Pending Prescriptions Disp Refills     verapamil (CALAN) 40 MG tablet  Last Written Prescription Date:  06/14/17  Last Fill Quantity: 90,  # refills: 3   Last Office Visit with [facility], [facility] or [facility] prescribing provider: 01/17/18Garland Vergara   Future Office Visit:    270 tablet 3     Sig: Week 1: 40 mg at bed time. Week 2: 40 mg two times a day. Week 3 & afterwards: 40 mg three times    Calcium Channel Blockers Protocol  Failed    9/4/2018  2:34 PM       Failed - Normal ALT in past 12 months    No lab results found.         Failed - Normal serum creatinine on file in past 12 months    No lab results found.         Passed - Blood pressure under 140/90 in past 12 months    BP Readings from Last 3 Encounters:   01/17/18 104/72   12/06/17 104/66   09/18/17 115/77                Passed - Recent (12 mo) or future (30 days) visit within the authorizing provider's specialty    Patient had office visit in the last 12 months or has a visit in the next 30 days with authorizing provider or within the authorizing provider's specialty.  See \"Patient Info\" tab in inbasket, or \"Choose Columns\" in Meds & Orders section of the refill encounter.           Passed - Patient is age 18 or older       Passed - No active pregnancy on record       Passed - No positive pregnancy test in past 12 months          "

## 2018-09-07 NOTE — TELEPHONE ENCOUNTER
Pt needs to schedule then will refill until appt.  Last order was a taper, need to know what the pt is taking.

## 2018-09-11 RX ORDER — VERAPAMIL HYDROCHLORIDE 40 MG/1
40 TABLET ORAL 3 TIMES DAILY
Qty: 90 TABLET | Refills: 0 | Status: SHIPPED | OUTPATIENT
Start: 2018-09-11 | End: 2018-09-28

## 2018-09-11 NOTE — TELEPHONE ENCOUNTER
RN please refill medication migraine medication, Patient takes 1 pill  3 times a day,  Patient is scheduled for pre-visit labs and Migraine medication/Work wellness visit. 09/28/18  ALEJANDRA Pitt

## 2018-09-18 ENCOUNTER — DOCUMENTATION ONLY (OUTPATIENT)
Dept: LAB | Facility: CLINIC | Age: 48
End: 2018-09-18

## 2018-09-18 DIAGNOSIS — Z00.00 ROUTINE GENERAL MEDICAL EXAMINATION AT A HEALTH CARE FACILITY: ICD-10-CM

## 2018-09-18 DIAGNOSIS — Z13.6 CARDIOVASCULAR SCREENING; LDL GOAL LESS THAN 160: Primary | ICD-10-CM

## 2018-09-18 NOTE — PROGRESS NOTES
Patient has an upcoming previsit appointment on 09/21/2018. Please review pended orders and add additional orders if needed.     Thank you,   Margarita Cole

## 2018-09-19 NOTE — PROGRESS NOTES
Please review and sign Pending Pre-visit Labs in [facility].Labs 09/21/18 and work wellness exam 09/28/18 Lalitha ROBERTS

## 2018-09-21 DIAGNOSIS — Z00.00 ROUTINE GENERAL MEDICAL EXAMINATION AT A HEALTH CARE FACILITY: ICD-10-CM

## 2018-09-21 DIAGNOSIS — Z13.6 CARDIOVASCULAR SCREENING; LDL GOAL LESS THAN 160: ICD-10-CM

## 2018-09-21 LAB
ANION GAP SERPL CALCULATED.3IONS-SCNC: 6 MMOL/L (ref 3–14)
BUN SERPL-MCNC: 13 MG/DL (ref 7–30)
CALCIUM SERPL-MCNC: 9.4 MG/DL (ref 8.5–10.1)
CHLORIDE SERPL-SCNC: 107 MMOL/L (ref 94–109)
CHOLEST SERPL-MCNC: 207 MG/DL
CO2 SERPL-SCNC: 29 MMOL/L (ref 20–32)
CREAT SERPL-MCNC: 0.92 MG/DL (ref 0.52–1.04)
GFR SERPL CREATININE-BSD FRML MDRD: 65 ML/MIN/1.7M2
GLUCOSE SERPL-MCNC: 90 MG/DL (ref 70–99)
HDLC SERPL-MCNC: 80 MG/DL
LDLC SERPL CALC-MCNC: 111 MG/DL
NONHDLC SERPL-MCNC: 127 MG/DL
POTASSIUM SERPL-SCNC: 4.5 MMOL/L (ref 3.4–5.3)
SODIUM SERPL-SCNC: 142 MMOL/L (ref 133–144)
TRIGL SERPL-MCNC: 80 MG/DL

## 2018-09-21 PROCEDURE — 36415 COLL VENOUS BLD VENIPUNCTURE: CPT | Performed by: FAMILY MEDICINE

## 2018-09-21 PROCEDURE — 80061 LIPID PANEL: CPT | Performed by: FAMILY MEDICINE

## 2018-09-21 PROCEDURE — 80048 BASIC METABOLIC PNL TOTAL CA: CPT | Performed by: FAMILY MEDICINE

## 2018-09-24 NOTE — PROGRESS NOTES
"  SUBJECTIVE:   An Cornejo is a 48 year old female who presents to clinic today for the following health issues:      Migraine Follow-Up    Headaches symptoms:  {STABLE/WORSENED/IMPROVED:029319::\"Stable ***\"}    Frequency: ***     Duration of headaches: ***    Able to do normal daily activities/work with migraines: {NO/YES:658893::\"No - ***\"}    Rescue/Relief medication:{Headache Meds Used:629158}              Effectiveness: {RELIEF:173280} relief    Preventative medication: {NONE DEFAULTED:545428::\"None\"}    Neurologic complications: {MIGRAINE COMPLICATIONS:813557::\"No new stroke-like symptoms, loss of vision or speech, numbness or weakness\"}    In the past 4 weeks, how often have you gone to Urgent Care or the emergency room because of your headaches?  {MIGRAINE FREQUENCY:872640}      Amount of exercise or physical activity: {Exercise frequency days per week:416853}    Problems taking medications regularly: {Med Problems:206169::\"No\"}    Medication side effects: {CHRONIC MED SIDE EFFECTS:659397::\"none\"}    Diet: { :007312}        {additional problems for provider to add:270931}    Problem list and histories reviewed & adjusted, as indicated.  Additional history: {NONE - AS DOCUMENTED:270039::\"as documented\"}    {HIST REVIEW/ LINKS 2:578440}    Reviewed and updated as needed this visit by clinical staff       Reviewed and updated as needed this visit by Provider         {PROVIDER CHARTING PREFERENCE:291051}  "

## 2018-09-28 ENCOUNTER — OFFICE VISIT (OUTPATIENT)
Dept: FAMILY MEDICINE | Facility: CLINIC | Age: 48
End: 2018-09-28
Payer: COMMERCIAL

## 2018-09-28 ENCOUNTER — RADIANT APPOINTMENT (OUTPATIENT)
Dept: GENERAL RADIOLOGY | Facility: CLINIC | Age: 48
End: 2018-09-28
Attending: FAMILY MEDICINE
Payer: COMMERCIAL

## 2018-09-28 VITALS
HEIGHT: 67 IN | SYSTOLIC BLOOD PRESSURE: 108 MMHG | WEIGHT: 138.6 LBS | TEMPERATURE: 98.4 F | BODY MASS INDEX: 21.75 KG/M2 | DIASTOLIC BLOOD PRESSURE: 72 MMHG | HEART RATE: 73 BPM

## 2018-09-28 DIAGNOSIS — N91.2 ABSENCE OF MENSTRUATION: ICD-10-CM

## 2018-09-28 DIAGNOSIS — R07.89 CHEST WALL PAIN: ICD-10-CM

## 2018-09-28 DIAGNOSIS — J45.31 MILD PERSISTENT ASTHMA WITH EXACERBATION: ICD-10-CM

## 2018-09-28 DIAGNOSIS — M54.12 CERVICAL RADICULOPATHY: ICD-10-CM

## 2018-09-28 DIAGNOSIS — G43.809 VESTIBULAR MIGRAINE: Primary | ICD-10-CM

## 2018-09-28 DIAGNOSIS — F41.9 ANXIETY: ICD-10-CM

## 2018-09-28 LAB
FSH SERPL-ACNC: 118 IU/L
LH SERPL-ACNC: 49 IU/L

## 2018-09-28 PROCEDURE — 71101 X-RAY EXAM UNILAT RIBS/CHEST: CPT | Mod: RT

## 2018-09-28 PROCEDURE — 83001 ASSAY OF GONADOTROPIN (FSH): CPT | Performed by: FAMILY MEDICINE

## 2018-09-28 PROCEDURE — 99214 OFFICE O/P EST MOD 30 MIN: CPT | Performed by: FAMILY MEDICINE

## 2018-09-28 PROCEDURE — 83002 ASSAY OF GONADOTROPIN (LH): CPT | Performed by: FAMILY MEDICINE

## 2018-09-28 PROCEDURE — 36415 COLL VENOUS BLD VENIPUNCTURE: CPT | Performed by: FAMILY MEDICINE

## 2018-09-28 RX ORDER — ALPRAZOLAM 0.5 MG
0.5 TABLET ORAL
Qty: 90 TABLET | Refills: 1 | Status: SHIPPED | OUTPATIENT
Start: 2018-09-28 | End: 2020-03-02

## 2018-09-28 RX ORDER — ALBUTEROL SULFATE 90 UG/1
2 AEROSOL, METERED RESPIRATORY (INHALATION) EVERY 4 HOURS PRN
Qty: 1 INHALER | Refills: 2 | Status: SHIPPED | OUTPATIENT
Start: 2018-09-28 | End: 2020-05-01

## 2018-09-28 RX ORDER — VERAPAMIL HYDROCHLORIDE 40 MG/1
40 TABLET ORAL 3 TIMES DAILY
Qty: 90 TABLET | Refills: 1 | Status: SHIPPED | OUTPATIENT
Start: 2018-09-28 | End: 2020-05-01

## 2018-09-28 RX ORDER — RIZATRIPTAN BENZOATE 10 MG/1
10 TABLET ORAL
Qty: 18 TABLET | Refills: 1 | Status: SHIPPED | OUTPATIENT
Start: 2018-09-28 | End: 2023-10-25

## 2018-09-28 RX ORDER — CYCLOBENZAPRINE HCL 10 MG
10 TABLET ORAL
Qty: 30 TABLET | Refills: 1 | Status: SHIPPED | OUTPATIENT
Start: 2018-09-28 | End: 2020-05-01

## 2018-09-28 ASSESSMENT — ENCOUNTER SYMPTOMS: HEADACHES: 1

## 2018-09-28 NOTE — LETTER
My Asthma Action Plan  Name: An Cornejo   YOB: 1970  Date: 9/24/2018   My doctor: Melania Vega MD   My clinic: [facility]        My Control Medicine: None  My Rescue Medicine: Albuterol (Proair/Ventolin/Proventil) inhaler as needed   My Asthma Severity: intermittent  Avoid your asthma triggers: upper respiratory infections               GREEN ZONE   Good Control    I feel good    No cough or wheeze    Can work, sleep and play without asthma symptoms       Take your asthma control medicine every day.     1. If exercise triggers your asthma, take your rescue medication    15 minutes before exercise or sports, and    During exercise if you have asthma symptoms  2. Spacer to use with inhaler: If you have a spacer, make sure to use it with your inhaler             YELLOW ZONE Getting Worse  I have ANY of these:    I do not feel good    Cough or wheeze    Chest feels tight    Wake up at night   1. Keep taking your Green Zone medications  2. Start taking your rescue medicine:    every 20 minutes for up to 1 hour. Then every 4 hours for 24-48 hours.  3. If you stay in the Yellow Zone for more than 12-24 hours, contact your doctor.  4. If you do not return to the Green Zone in 12-24 hours or you get worse, start taking your oral steroid medicine if prescribed by your provider.           RED ZONE Medical Alert - Get Help  I have ANY of these:    I feel awful    Medicine is not helping    Breathing getting harder    Trouble walking or talking    Nose opens wide to breathe       1. Take your rescue medicine NOW  2. If your provider has prescribed an oral steroid medicine, start taking it NOW  3. Call your doctor NOW  4. If you are still in the Red Zone after 20 minutes and you have not reached your doctor:    Take your rescue medicine again and    Call 911 or go to the emergency room right away    See your regular doctor within 2 weeks of an Emergency Room or Urgent Care visit for follow-up  treatment.          Annual Reminders:  Meet with Asthma Educator,  Flu Shot in the Fall, consider Pneumonia Vaccination for patients with asthma (aged 19 and older).    Pharm[id number]  [facility] [address] [facility],  AT [address] [facility]                      Asthma Triggers  How To Control Things That Make Your Asthma Worse    Triggers are things that make your asthma worse.  Look at the list below to help you find your triggers and what you can do about them.  You can help prevent asthma flare-ups by staying away from your triggers.      Trigger                                                          What you can do   Cigarette Smoke  Tobacco smoke can make asthma worse. Do not allow smoking in your home, car or around you.  Be sure no one smokes at a child s day care or school.  If you smoke, ask your health care provider for ways to help you quit.  Ask family members to quit too.  Ask your health care provider for a referral to Quit Plan to help you quit smoking, or call 8-292-126-PLAN.     Colds, Flu, Bronchitis  These are common triggers of asthma. Wash your hands often.  Don t touch your eyes, nose or mouth.  Get a flu shot every year.     Dust Mites  These are tiny bugs that live in cloth or carpet. They are too small to see. Wash sheets and blankets in hot water every week.   Encase pillows and mattress in dust mite proof covers.  Avoid having carpet if you can. If you have carpet, vacuum weekly.   Use a dust mask and HEPA vacuum.   Pollen and Outdoor Mold  Some people are allergic to trees, grass, or weed pollen, or molds. Try to keep your windows closed.  Limit time out doors when pollen count is high.   Ask you health care provider about taking medicine during allergy season.     Animal Dander  Some people are allergic to skin flakes, urine or saliva from pets with fur or feathers. Keep pets with fur or feathers out of your home.    If you can t keep the pet outdoors,  then keep the pet out of your bedroom.  Keep the bedroom door closed.  Keep pets off cloth furniture and away from stuffed toys.     Mice, Rats, and Cockroaches  Some people are allergic to the waste from these pests.   Cover food and garbage.  Clean up spills and food crumbs.  Store grease in the refrigerator.   Keep food out of the bedroom.   Indoor Mold  This can be a trigger if your home has high moisture. Fix leaking faucets, pipes, or other sources of water.   Clean moldy surfaces.  Dehumidify basement if it is damp and smelly.   Smoke, Strong Odors, and Sprays  These can reduce air quality. Stay away from strong odors and sprays, such as perfume, powder, hair spray, paints, smoke incense, paint, cleaning products, candles and new carpet.   Exercise or Sports  Some people with asthma have this trigger. Be active!  Ask your doctor about taking medicine before sports or exercise to prevent symptoms.    Warm up for 5-10 minutes before and after sports or exercise.     Other Triggers of Asthma  Cold air:  Cover your nose and mouth with a scarf.  Sometimes laughing or crying can be a trigger.  Some medicines and food can trigger asthma.

## 2018-09-28 NOTE — MR AVS SNAPSHOT
After Visit Summary   9/28/2018    An Cornejo    MRN: [id number]           Patient Information     Date Of Birth          1970        Visit Information        Provider Department      9/28/2018 7:35 AM Melania Vega MD [facility]        Today's Diagnoses     Vestibular migraine    -  1    Cervical radiculopathy        Anxiety        Mild persistent asthma with exacerbation        Absence of menstruation        Chest wall pain          Care Instructions          Book physical within 2-3 months          Follow-ups after your visit        Follow-up notes from your care team     Return in about 2 months (around 11/28/2018) for Physical Exam.      Future tests that were ordered for you today     Open Future Orders        Priority Expected Expires Ordered    XR Ribs & Chest Right G/E 3 Views Routine 9/28/2018 9/28/2019 9/28/2018            Who to contact     If you have questions or need follow up information about today's clinic visit or your schedule please contact [facility] directly at [phone].  Normal or non-critical lab and imaging results will be communicated to you by MyChart, letter or phone within 4 business days after the clinic has received the results. If you do not hear from us within 7 days, please contact the clinic through [employer] or phone. If you have a critical or abnormal lab result, we will notify you by phone as soon as possible.  Submit refill requests through [email or web address] or call your pharmacy and they will forward the refill request to us. Please allow 3 business days for your refill to be completed.          Additional Information About Your Visit        MyChart Information     [email or web address] gives you secure access to your electronic health record. If you see a primary care provider, you can also send messages to your care team and make appointments. If you have questions, please call your primary care clinic.  If you do not have a primary care  "provider, please call [phone] and they will assist you.        Care EveryWhere ID     This is your Care EveryWhere ID. This could be used by other organizations to access your [facility] medical records  [id number]        Your Vitals Were     Pulse Temperature Height BMI (Body Mass Index)          73 98.4  F (36.9  C) (Oral) 5' 7.25\" (1.708 m) 21.55 kg/m2         Blood Pressure from Last 3 Encounters:   09/28/18 108/72   01/17/18 104/72   12/06/17 104/66    Weight from Last 3 Encounters:   09/28/18 138 lb 9.6 oz (62.9 kg)   01/17/18 140 lb 12.8 oz (63.9 kg)   12/06/17 138 lb (62.6 kg)              We Performed the Following     Follicle stimulating hormone     Lutropin          Today's Medication Changes          These changes are accurate as of 9/28/18  8:05 AM.  If you have any questions, ask your nurse or doctor.               These medicines have changed or have updated prescriptions.        Dose/Directions    ALPRAZolam 0.5 MG tablet   Commonly known as:  XANAX   This may have changed:    - medication strength  - how much to take  - when to take this  - reasons to take this   Used for:  Anxiety   Changed by:  Melania Vega MD        Dose:  0.5 mg   Take 1 tablet (0.5 mg) by mouth nightly as needed for sleep   Quantity:  90 tablet   Refills:  1       verapamil 40 MG tablet   Commonly known as:  CALAN   This may have changed:  additional instructions   Used for:  Vestibular migraine   Changed by:  Melania Vega MD        Dose:  40 mg   Take 1 tablet (40 mg) by mouth 3 times daily   Quantity:  90 tablet   Refills:  1            Where to get your medicines      These medications were sent to [facility]/pharmacy #[id number] - [facility] [address] [facility],  AT CORNER OF [address], , [facility] [address]     Phone:  [phone]     albuterol 108 (90 Base) MCG/ACT inhaler    cyclobenzaprine 10 MG tablet    rizatriptan 10 MG tablet    verapamil 40 MG tablet       "   Some of these will need a paper prescription and others can be bought over the counter.  Ask your nurse if you have questions.     Bring a paper prescription for each of these medications     ALPRAZolam 0.5 MG tablet                Primary Care Provider Office Phone # Fax #    Melania Vega [phone] [address] [facility] [address]        Equal Access to Services     [facility] LAURI : Hadii aad ku hadasho Soomaali, waaxda luqadaha, qaybta kaalmada adeegyada, waxay idiin hayaan adeeg kharash la'aan . So [facility] [phone].    ATENCIÓN: Si habla español, tiene a mejía disposición servicios gratuitos de asistencia lingüística. Llame al [phone].    We comply with applicable federal civil rights laws and [place] laws. We do not discriminate on the basis of race, color, national origin, age, disability, sex, sexual orientation, or gender identity.            Thank you!     Thank you for choosing [facility]  for your care. Our goal is always to provide you with excellent care. Hearing back from our patients is one way we can continue to improve our services. Please take a few minutes to complete the written survey that you may receive in the mail after your visit with us. Thank you!             Your Updated Medication List - Protect others around you: Learn how to safely use, store and throw away your medicines at www.disposemymeds.org.          This list is accurate as of 9/28/18  8:05 AM.  Always use your most recent med list.                   Brand Name Dispense Instructions for use Diagnosis    albuterol 108 (90 Base) MCG/ACT inhaler    PROAIR HFA/PROVENTIL HFA/VENTOLIN HFA    1 Inhaler    Inhale 2 puffs into the lungs every 4 hours as needed for shortness of breath / dyspnea    Mild persistent asthma with exacerbation       ALPRAZolam 0.5 MG tablet    XANAX    90 tablet    Take 1 tablet (0.5 mg) by mouth nightly as needed for sleep    Anxiety        cyclobenzaprine 10 MG tablet    FLEXERIL    30 tablet    Take 1 tablet (10 mg) by mouth nightly as needed for muscle spasms    Cervical radiculopathy       fluticasone 50 MCG/ACT spray    FLONASE    16 mL    2 SPRAYS EACH NOSTRIL TWICE DAILY, FOR 1 WEEK, THEN 2 SPRAYS EACH NOSTRIL 1 TIME DAILY    Seasonal allergic rhinitis       ipratropium 0.06 % spray    ATROVENT    1 Box    Spray 2 sprays into both nostrils 4 times daily as needed    Rhinorrhea       MIRENA (52 MG) 20 MCG/24HR IUD   Generic drug:  levonorgestrel      1 each by Intrauterine route once Reported on 4/14/2017        rizatriptan 10 MG tablet    MAXALT    18 tablet    Take 1 tablet (10 mg) by mouth at onset of headache for migraine May repeat in 2 hours. Max 3 tablets/24 hours.    Vestibular migraine       verapamil 40 MG tablet    CALAN    90 tablet    Take 1 tablet (40 mg) by mouth 3 times daily    Vestibular migraine

## 2018-09-28 NOTE — NURSING NOTE
"Chief Complaint   Patient presents with     Headache     Health Maintenance     ACT       Initial /72  Pulse 73  Temp 98.4  F (36.9  C) (Oral)  Ht 5' 7.25\" (1.708 m)  Wt 138 lb 9.6 oz (62.9 kg)  BMI 21.55 kg/m2 Estimated body mass index is 21.55 kg/(m^2) as calculated from the following:    Height as of this encounter: 5' 7.25\" (1.708 m).    Weight as of this encounter: 138 lb 9.6 oz (62.9 kg).  Medication Reconciliation: complete  Pedro Buckner CMA    "

## 2018-09-28 NOTE — PROGRESS NOTES
SUBJECTIVE:   An Cornejo is a 48 year old female who presents to clinic today for the following health issues:      Headache      History of Present Illness     Migraines:     Headache Symptoms are:  Improving    Migraine frequency::  3 per year    Migraine Duration::  >3 days    Ability to perform ADL's::  No    Migraine Rescue/Relief Medications::  Ibuprofen (Advil, Motrin) and Tylenol    Effectiveness of rescue/relief medications::  Minor relief    Migraine Preventative Medications::  Verapamil    Neurological symptoms::  None    ER or UC Visits::  None    Diet:  Regular (no restrictions)  Frequency of exercise:  2-3 days/week  Duration of exercise:  45-60 minutes  Taking medications regularly:  Yes  Additional concerns today:  YES    Mirena  2018, periods have become irregular.  Questioning if in menopause. Having hot flashes, no night sweats, gets occas spotting - light for a day, not regular.   Has concerns about possible rib fx - she was messing around with her daughter, hit right side on the couch arm, heard a crack and was in pain x 1 month or so.  This occurred again a few weeks ago.  She wonders if she needs a bone density scan to check if she has thin bones. Never sought care for these injuries. No xray at the time.    Needs refills of migraine meds          Problem list and histories reviewed & adjusted, as indicated.  Additional history: as documented        Patient Active Problem List   Diagnosis     CARDIOVASCULAR SCREENING; LDL GOAL LESS THAN 160     Anxiety     Mild persistent asthma     Sore throat (viral)     Asthma, exercise induced     Exposure to pertussis     Rectocele     KATIE (stress urinary incontinence, female)     Papanicolaou smear of cervix with low grade squamous intraepithelial lesion (LGSIL)     Past Surgical History:   Procedure Laterality Date     BREAST LUMPECTOMY, RT/LT Left     Benign lesion as per patient     COLPORRHAPHY POSTERIOR N/A 10/21/2014    Procedure:  COLPORRHAPHY POSTERIOR;  Surgeon: Fabian Harding MD;  Location: [facility] OR     CYSTOSCOPY, SLING TRANSVAGINAL N/A 10/21/2014    Procedure: CYSTOSCOPY, SLING TRANSVAGINAL;  Surgeon: Fabian Harding MD;  Location: [facility] OR     EXTRACTION(S) DENTAL       PERINEORRHAPHY N/A 10/21/2014    Procedure: PERINEORRHAPHY;  Surgeon: Fabian Harding MD;  Location: [facility] OR     RECONSTRUCT BREAST BILATERAL         Social History   Substance Use Topics     Smoking status: Never Smoker     Smokeless tobacco: Never Used      Comment: nonsmoking household     Alcohol use 0.0 oz/week     0 Standard drinks or equivalent per week      Comment: 2 glasses of gin and tonic may be once per month     Family History   Problem Relation Age of Onset     Depression Mother      Hypertension Mother      Lipids Mother      Arthritis Mother      Asthma Mother      Pulmonary Embolism Mother      Breast Cancer Maternal Grandmother      postmenopausal     Cancer Maternal Grandmother      Respiratory Paternal Grandfather      emphysema     Hypertension Father      Cancer Sister      Thyroid     Neurologic Disorder Sister      MS         Current Outpatient Prescriptions   Medication Sig Dispense Refill     albuterol (PROAIR HFA/PROVENTIL HFA/VENTOLIN HFA) 108 (90 Base) MCG/ACT inhaler Inhale 2 puffs into the lungs every 4 hours as needed for shortness of breath / dyspnea 1 Inhaler 2     ALPRAZolam (XANAX) 0.5 MG tablet Take 1 tablet (0.5 mg) by mouth nightly as needed for sleep 90 tablet 1     cyclobenzaprine (FLEXERIL) 10 MG tablet Take 1 tablet (10 mg) by mouth nightly as needed for muscle spasms 30 tablet 1     levonorgestrel (MIRENA) 20 MCG/24HR IUD 1 each by Intrauterine route once Reported on 4/14/2017       rizatriptan (MAXALT) 10 MG tablet Take 1 tablet (10 mg) by mouth at onset of headache for migraine May repeat in 2 hours. Max 3 tablets/24 hours. 18 tablet 1     verapamil (CALAN) 40 MG tablet Take 1 tablet (40 mg) by mouth 3 times daily 90  "tablet 1     fluticasone (FLONASE) 50 MCG/ACT spray 2 SPRAYS EACH NOSTRIL TWICE DAILY, FOR 1 WEEK, THEN 2 SPRAYS EACH NOSTRIL 1 TIME DAILY (Patient not taking: Reported on 1/17/2018) 16 mL 1     ipratropium (ATROVENT) 0.06 % spray Spray 2 sprays into both nostrils 4 times daily as needed (Patient not taking: Reported on 1/17/2018) 1 Box 11     BP Readings from Last 3 Encounters:   09/28/18 108/72   01/17/18 104/72   12/06/17 104/66    Wt Readings from Last 3 Encounters:   09/28/18 138 lb 9.6 oz (62.9 kg)   01/17/18 140 lb 12.8 oz (63.9 kg)   12/06/17 138 lb (62.6 kg)                  Labs reviewed in EPIC    ROS:  Constitutional, HEENT, cardiovascular, pulmonary, gi and gu systems are negative, except as otherwise noted.    OBJECTIVE:     /72  Pulse 73  Temp 98.4  F (36.9  C) (Oral)  Ht 5' 7.25\" (1.708 m)  Wt 138 lb 9.6 oz (62.9 kg)  BMI 21.55 kg/m2  Body mass index is 21.55 kg/(m^2).  GENERAL: healthy, alert and no distress  EYES: Eyes grossly normal to inspection, PERRL and conjunctivae and sclerae normal  CHEST: no ttp over right lateral chest ribs 8-10 is area that was painful  MS: no gross musculoskeletal defects noted, no edema  SKIN: no suspicious lesions or rashes  NEURO: Normal strength and tone, mentation intact and speech normal  PSYCH: mentation appears normal, affect normal/bright    Diagnostic Test Results:  Cxr/rib right - no sign of acute or healing fx, normal density    ASSESSMENT/PLAN:     (G43.109) Vestibular migraine  (primary encounter diagnosis)  Comment: well controlled  Plan: verapamil (CALAN) 40 MG tablet, rizatriptan         (MAXALT) 10 MG tablet         Refill x 6 months     (M54.12) Cervical radiculopathy  Comment: stable, uses occas  Plan: cyclobenzaprine (FLEXERIL) 10 MG tablet         Refill x 6 months     (F41.9) Anxiety  Comment: start to use for sleep at night due to awakens then cannot get back to sleep due to thoughts won't shut off  Plan: ALPRAZolam (XANAX) 0.5 MG " tablet         Refill x 6 months     (J45.31) Mild persistent asthma with exacerbation  Comment: stable  Plan: albuterol (PROAIR HFA/PROVENTIL HFA/VENTOLIN         HFA) 108 (90 Base) MCG/ACT inhaler         Refill x 6 months     (N91.2) Absence of menstruation  Comment: possible menopause  Plan: Follicle stimulating hormone, Lutropin        Remove IUD - replace if not in menopause    (R07.89) Chest wall pain  Comment: check for rib fx  Plan: XR Ribs & Chest Right G/E 3 Views              Patient Instructions         Book physical within 2-3 months      Melania Vega MD  [facility] [place]  Answers for HPI/ROS submitted by the patient on 9/28/2018   PHQ-2 Score: 0

## 2018-09-28 NOTE — LETTER
My Migraine Action Plan      Date: 9/24/2018     My Name: An Cornejo   YOB: 1970  [id number] [place] [place] [facility] [address] AT [address] OF [facility]       My (Preventative) Control Medicine: verapamil        My Rescue Medicine: maxalt   My Doctor: Melania Vega     My Clinic: [facility]  [address] [facility] [address] [phone]        GREEN ZONE = Good Control    My headache plan is working.   I can do what I need to do.           I WILL:     ? Keep managing my triggers.  ? Write in my migraine diary each time I have a headache.  ? Keep taking my preventive (controller) medicine daily.  ? Take my relief and rescue medicine as needed.             YELLOW ZONE = Not Enough Control    My headache plan isn t always working.   My headaches keep me from doing   some of the things I need to do.       I WILL:     ? Set goals to control my triggers and act on them.  ? Write in my migraine diary each time I have a headache and review it for                      patterns or new triggers.  ? Keep taking my preventive (controller) medicine daily.  ? Take my relief and rescue medicine as needed.  ? Call my doctor or clinic at if I stay in the Yellow Zone.             RED ZONE = Poor or No Control    My headache plan has  failed. I can t do anything  when I have one. My  medicines aren t working.           I WILL:   ? Set goals to control my triggers and act on them.  ? Write in my migraine diary each time I have a headache and review it for                      patterns or new triggers.  ? Keep taking my preventive (controller) medicine daily.  ? Take my relief and rescue medicine as needed.  ? Call my doctor or clinic or go to urgent care or an ER if I m having the worst                  headache of my life.  ? Call my doctor or clinic or go to urgent care or an ER if my medicine doesn t work.  ? Let my doctor or clinic know within  2 weeks if I have gone to an urgent care or             emergency department.          Provider specific instructions:

## 2018-09-29 ASSESSMENT — ASTHMA QUESTIONNAIRES: ACT_TOTALSCORE: 24

## 2018-11-02 ENCOUNTER — OFFICE VISIT (OUTPATIENT)
Dept: ORTHOPEDICS | Facility: CLINIC | Age: 48
End: 2018-11-02
Payer: COMMERCIAL

## 2018-11-02 ENCOUNTER — RADIANT APPOINTMENT (OUTPATIENT)
Dept: GENERAL RADIOLOGY | Facility: CLINIC | Age: 48
End: 2018-11-02
Attending: FAMILY MEDICINE
Payer: COMMERCIAL

## 2018-11-02 VITALS — SYSTOLIC BLOOD PRESSURE: 110 MMHG | DIASTOLIC BLOOD PRESSURE: 71 MMHG

## 2018-11-02 DIAGNOSIS — S59.901A ELBOW INJURY, RIGHT, INITIAL ENCOUNTER: ICD-10-CM

## 2018-11-02 DIAGNOSIS — G56.21 ULNAR NEUROPATHY OF RIGHT UPPER EXTREMITY: ICD-10-CM

## 2018-11-02 DIAGNOSIS — S59.901A ELBOW INJURY, RIGHT, INITIAL ENCOUNTER: Primary | ICD-10-CM

## 2018-11-02 PROCEDURE — 99213 OFFICE O/P EST LOW 20 MIN: CPT | Performed by: FAMILY MEDICINE

## 2018-11-02 PROCEDURE — 73080 X-RAY EXAM OF ELBOW: CPT | Mod: RT

## 2018-11-02 NOTE — MR AVS SNAPSHOT
After Visit Summary   11/2/2018    An Cornejo    MRN: [id number]           Patient Information     Date Of Birth          1970        Visit Information        Provider Department      11/2/2018 12:00 PM Chris Liu MD [facility]        Today's Diagnoses     Elbow injury, right, initial encounter    -  1      Care Instructions      1) Have sons clean their own bathroom  2) Use towel or pillow to brace your arm straight at night (can buy brace on amazon pic below)  3) Follow-up if not better in 2 months     What Is Cubital Tunnel Syndrome?  Cubital tunnel syndrome is a set of symptoms that may occur if the ulnar nerve in your elbow gets pinched. This may happen if you bend or lean on your elbows often.    Your cubital tunnel  The cubital tunnel is a groove in a bone near your elbow. This narrow groove provides a passage for the ulnar nerve, one of the main nerves in your arm. The ulnar nerve can cause  funny bone  pain if your elbow gets bumped. Your cubital tunnel helps protect this nerve as it passes through your elbow and down to your fingers.  Compressing the ulnar nerve  Bending your elbow compresses the ulnar nerve inside the cubital tunnel. The nerve can get inflamed (irritated) after constant bending and pinching or after getting hurt. Over time, this can lead to pain or numbness. The pain is often felt in your ring fingers and little fingers.     Bending your elbow as you hold a phone can cause problems over time.      What are its symptoms?    Numbness or tingling in the ring fingers and little fingers    Loss of finger or hand strength    Inability to straighten fingers    Sharp, sudden pain when elbow is touched  The road to healing  You can keep cubital tunnel syndrome from flaring up. Avoid pinching the ulnar nerve by keeping your arm straight as much as you can, even while sleeping. And use phone headsets and elbow pads. If you still have  pain, tell your doctor.   Date Last Reviewed: 9/8/2015 2000-2017 [employer]. [address] [place] [address], [place] [address]. All rights reserved. This information is not intended as a substitute for professional medical care. Always follow your healthcare professional's instructions.                Follow-ups after your visit        Follow-up notes from your care team     Return in about 2 months (around 1/2/2019).      Who to contact     If you have questions or need follow up information about today's clinic visit or your schedule please contact [facility] directly at [phone].  Normal or non-critical lab and imaging results will be communicated to you by MyChart, letter or phone within 4 business days after the clinic has received the results. If you do not hear from us within 7 days, please contact the clinic through [employer] or phone. If you have a critical or abnormal lab result, we will notify you by phone as soon as possible.  Submit refill requests through [employer] or call your pharmacy and they will forward the refill request to us. Please allow 3 business days for your refill to be completed.          Additional Information About Your Visit        MyChart Information     [employer] gives you secure access to your electronic health record. If you see a primary care provider, you can also send messages to your care team and make appointments. If you have questions, please call your primary care clinic.  If you do not have a primary care provider, please call [phone] and they will assist you.        Care EveryWhere ID     This is your Care EveryWhere ID. This could be used by other organizations to access your [facility] medical records  [id number]         Blood Pressure from Last 3 Encounters:   11/02/18 110/71   09/28/18 108/72   01/17/18 104/72    Weight from Last 3 Encounters:   11/02/18 (P) 138 lb (62.6 kg)   09/28/18 138 lb 9.6 oz (62.9 kg)   01/17/18  140 lb 12.8 oz (63.9 kg)               Primary Care Provider Office Phone # Fax #    Melania Daphney Vega [phone] [address] [facility] [address]        Equal Access to Services     [facility] CARREON : Hadii aad ku hadjose martino Soomaali, waaxda luqadaha, qaybta kaalmada adeegyada, geovanny hernandezn adechilo churchill lajonasradha orellana. So [facility] [phone].    ATENCIÓN: Si habla español, tiene a mejía disposición servicios gratuitos de asistencia lingüística. Llame al [phone].    We comply with applicable federal civil rights laws and [place] laws. We do not discriminate on the basis of race, color, national origin, age, disability, sex, sexual orientation, or gender identity.            Thank you!     Thank you for choosing [facility]  for your care. Our goal is always to provide you with excellent care. Hearing back from our patients is one way we can continue to improve our services. Please take a few minutes to complete the written survey that you may receive in the mail after your visit with us. Thank you!             Your Updated Medication List - Protect others around you: Learn how to safely use, store and throw away your medicines at www.disposemymeds.org.          This list is accurate as of 11/2/18 12:43 PM.  Always use your most recent med list.                   Brand Name Dispense Instructions for use Diagnosis    albuterol 108 (90 Base) MCG/ACT inhaler    PROAIR HFA/PROVENTIL HFA/VENTOLIN HFA    1 Inhaler    Inhale 2 puffs into the lungs every 4 hours as needed for shortness of breath / dyspnea    Mild persistent asthma with exacerbation       ALPRAZolam 0.5 MG tablet    XANAX    90 tablet    Take 1 tablet (0.5 mg) by mouth nightly as needed for sleep    Anxiety       cyclobenzaprine 10 MG tablet    FLEXERIL    30 tablet    Take 1 tablet (10 mg) by mouth nightly as needed for muscle spasms    Cervical radiculopathy       fluticasone 50 MCG/ACT spray     FLONASE    16 mL    2 SPRAYS EACH NOSTRIL TWICE DAILY, FOR 1 WEEK, THEN 2 SPRAYS EACH NOSTRIL 1 TIME DAILY    Seasonal allergic rhinitis       ipratropium 0.06 % spray    ATROVENT    1 Box    Spray 2 sprays into both nostrils 4 times daily as needed    Rhinorrhea       MIRENA (52 MG) 20 MCG/24HR IUD   Generic drug:  levonorgestrel      1 each by Intrauterine route once Reported on 4/14/2017        rizatriptan 10 MG tablet    MAXALT    18 tablet    Take 1 tablet (10 mg) by mouth at onset of headache for migraine May repeat in 2 hours. Max 3 tablets/24 hours.    Vestibular migraine       verapamil 40 MG tablet    CALAN    90 tablet    Take 1 tablet (40 mg) by mouth 3 times daily    Vestibular migraine

## 2018-11-02 NOTE — PATIENT INSTRUCTIONS
1) Have sons clean their own bathroom  2) Use towel or pillow to brace your arm straight at night (can buy brace on amaz[employer] pic below)  3) Follow-up if not better in 2 months     What Is Cubital Tunnel Syndrome?  Cubital tunnel syndrome is a set of symptoms that may occur if the ulnar nerve in your elbow gets pinched. This may happen if you bend or lean on your elbows often.    Your cubital tunnel  The cubital tunnel is a groove in a bone near your elbow. This narrow groove provides a passage for the ulnar nerve, one of the main nerves in your arm. The ulnar nerve can cause  funny bone  pain if your elbow gets bumped. Your cubital tunnel helps protect this nerve as it passes through your elbow and down to your fingers.  Compressing the ulnar nerve  Bending your elbow compresses the ulnar nerve inside the cubital tunnel. The nerve can get inflamed (irritated) after constant bending and pinching or after getting hurt. Over time, this can lead to pain or numbness. The pain is often felt in your ring fingers and little fingers.     Bending your elbow as you hold a phone can cause problems over time.      What are its symptoms?    Numbness or tingling in the ring fingers and little fingers    Loss of finger or hand strength    Inability to straighten fingers    Sharp, sudden pain when elbow is touched  The road to healing  You can keep cubital tunnel syndrome from flaring up. Avoid pinching the ulnar nerve by keeping your arm straight as much as you can, even while sleeping. And use phone headsets and elbow pads. If you still have pain, tell your doctor.   Date Last Reviewed: 9/8/2015 2000-2017 [employer]. [address] [place] [address], [place] [address]. All rights reserved. This information is not intended as a substitute for professional medical care. Always follow your healthcare professional's instructions.

## 2018-11-02 NOTE — LETTER
11/2/2018         RE: An Cornejo  [address] [facility] [address]        Dear Colleague,    Thank you for referring your patient, An Cornejo, to the [facility]. Please see a copy of my visit note below.    ASSESSMENT & PLAN  An was seen today for pain.    Diagnoses and all orders for this visit:    Elbow injury, right, initial encounter  -     XR Elbow RT G/E 3 vw; Future    Patient is a 48-year-old female with no significant past medical history presenting with a 1 month history of persisting right elbow pain with numbness and tingling going down into her hand in the fourth and fifth digits.  Exam findings positive for trans-locating ulnar nerve with negative x-ray imaging.  Given this patient likely has an ulnar neuropathy exacerbated by acute trauma and trans-locating ulnar nerve.  She was counseled on conservative management including relative rest, bracing arm straight at night and limit trauma to the area.  We will have her do this and follow-up if symptoms not improve or worsen.  Can consider EMG if not improved.  She was informed that definitive management would be surgical translocation fixation of the ulnar nerve anterior to the cubital tunnel.  Patient understands and agrees with plan.    -----    SUBJECTIVE  An Cornejo is a/an 48 year old Right handed female who is seen as a self referral for evaluation of right elbow pain. The patient is seen by themselves.    Onset: 1 month(s) ago. Patient describes injury as scrubbing bathroom and hit the trim with posterior elbow.  No sig neck or shoulder pain.  Sx worse in AM has N/T in 4-5th digits when she wakes up.  Pain worse with end extension, overuse.    Location of Pain: right posterior elbow radiating to lower arm and neck  Rating of Pain at worst: Significant  Rating of Pain Currently: Moderate  Worsened by: movement    Better with: nothing   Treatments tried: ibuprofen and topical cream   Associated  "symptoms: weakness, limited ROM, stiffness   Orthopedic history: YES - Chronic shoulder and neck pain  Relevant surgical history: None   Past Medical History:   Diagnosis Date     Anxiety     Use medication as needed     Asthma     Uses inhaler as needed     Episodic tension type headache     Started when she was in her 30's intermittent     IUD     Mirena inserted 6/5/13     Neck pain     Both side of neck     Papanicolaou smear of cervix with low grade squamous intraepithelial lesion (LGSIL) 10/31/16    + HR HPV 16 & other     Seizures (H) infancy    \"grew out of it\"     Status post colposcopy 11/28/16    ECC - negative     Upper back pain     Over 5 years or more     Social History     Social History     Marital status:      Spouse name: N/A     Number of children: 2     Years of education: 16     Occupational History     Writer      [employer]     Social History Main Topics     Smoking status: Never Smoker     Smokeless tobacco: Never Used      Comment: nonsmoking household     Alcohol use 0.0 oz/week     0 Standard drinks or equivalent per week      Comment: 2 glasses of gin and tonic may be once per month     Drug use: No     Sexual activity: Yes     Partners: Male     Birth control/ protection: IUD     Other Topics Concern     Not on file     Social History Narrative       Patient's past medical, surgical, social, and family histories were reviewed today and no changes are noted.    REVIEW OF SYSTEMS:  10 point ROS is negative other than symptoms noted above in HPI, Past Medical History or as stated below  Constitutional: NEGATIVE for fever, chills, change in weight  Skin: NEGATIVE for worrisome rashes, moles or lesions  GI/: NEGATIVE for bowel or bladder changes  Neuro: NEGATIVE for weakness, dizziness or paresthesias    OBJECTIVE:  /71  Ht (P) 5' 7.25\" (1.708 m)  Wt (P) 138 lb (62.6 kg)  BMI (P) 21.45 kg/m2   General: healthy, alert and in no distress  HEENT: no scleral " icterus or conjunctival erythema  Skin: no suspicious lesions or rash. No jaundice.  CV: regular rhythm by palpation  Resp: normal respiratory effort without conversational dyspnea   Psych: normal mood and affect  Gait: normal steady gait with appropriate coordination and balance  Neuro: Normal sensory exam of bilateral hands.   MSK:  RIGHT ELBOW   Inspection:    No swelling, bruising, discoloration, or obvious deformity or asymmetry  Palpation:    Tender about the Cubital . Remainder of bony, ligamentous and tendinous landmarks are nontender.    Crepitus is Absent  Range of Motion:     Extension full, limited by pain / flexion full / pronation full / supination full  Strength:    No deficits in flexion, extension, pronation, or supination.  Special Tests:    Positive: cubital tunnel (ulnar Tinel's), translocating ulnar nerve on the right    Negative: Pain with resisted wrist extension, pain with resisted middle finger extension, pain with resisted wrist flexion, pain with resisted supination    Independent visualization of the below image:  Recent Results (from the past 24 hour(s))   XR Elbow RT G/E 3 vw    Narrative    XR ELBOW RT G/E 3 VW 11/2/2018 1:09 PM     HISTORY: ; Elbow injury, right, initial encounter      Impression    IMPRESSION: Negative exam.    YANELY COSTA MD       Patient's conditions were thoroughly discussed during today's visit with greater than 50% of the visit spent counseling the patient with total time spent face-to-face with the patient being 30 minutes.    Chris Liu MD, [facility]        Again, thank you for allowing me to participate in the care of your patient.        Sincerely,        Chris Liu MD

## 2018-11-02 NOTE — PROGRESS NOTES
ASSESSMENT & PLAN  An was seen today for pain.    Diagnoses and all orders for this visit:    Elbow injury, right, initial encounter  -     XR Elbow RT G/E 3 vw; Future    Ulnar neuropathy of right upper extremity    Patient is a 48-year-old female with no significant past medical history presenting with a 1 month history of persisting right elbow pain with numbness and tingling going down into her hand in the fourth and fifth digits.  Exam findings positive for trans-locating ulnar nerve with negative x-ray imaging.  Given this patient likely has an ulnar neuropathy exacerbated by acute trauma and trans-locating ulnar nerve.  She was counseled on conservative management including relative rest, bracing arm straight at night and limit trauma to the area.  We will have her do this and follow-up if symptoms not improve or worsen.  Can consider EMG if not improved.  She was informed that definitive management would be surgical translocation fixation of the ulnar nerve anterior to the cubital tunnel.  Patient understands and agrees with plan.    -----    SUBJECTIVE  An Cornejo is a/an 48 year old Right handed female who is seen as a self referral for evaluation of right elbow pain. The patient is seen by themselves.    Onset: 1 month(s) ago. Patient describes injury as scrubbing bathroom and hit the trim with posterior elbow.  No sig neck or shoulder pain.  Sx worse in AM has N/T in 4-5th digits when she wakes up.  Pain worse with end extension, overuse.    Location of Pain: right posterior elbow radiating to lower arm and neck  Rating of Pain at worst: Significant  Rating of Pain Currently: Moderate  Worsened by: movement    Better with: nothing   Treatments tried: ibuprofen and topical cream   Associated symptoms: weakness, limited ROM, stiffness   Orthopedic history: YES - Chronic shoulder and neck pain  Relevant surgical history: None   Past Medical History:   Diagnosis Date     Anxiety     Use medication as  "needed     Asthma     Uses inhaler as needed     Episodic tension type headache     Started when she was in her 30's intermittent     IUD     Mirena inserted 6/5/13     Neck pain     Both side of neck     Papanicolaou smear of cervix with low grade squamous intraepithelial lesion (LGSIL) 10/31/16    + HR HPV 16 & other     Seizures (H) infancy    \"grew out of it\"     Status post colposcopy 11/28/16    ECC - negative     Upper back pain     Over 5 years or more     Social History     Social History     Marital status:      Spouse name: N/A     Number of children: 2     Years of education: 16     Occupational History     Writer      [employer]     Social History Main Topics     Smoking status: Never Smoker     Smokeless tobacco: Never Used      Comment: nonsmoking household     Alcohol use 0.0 oz/week     0 Standard drinks or equivalent per week      Comment: 2 glasses of gin and tonic may be once per month     Drug use: No     Sexual activity: Yes     Partners: Male     Birth control/ protection: IUD     Other Topics Concern     Not on file     Social History Narrative       Patient's past medical, surgical, social, and family histories were reviewed today and no changes are noted.    REVIEW OF SYSTEMS:  10 point ROS is negative other than symptoms noted above in HPI, Past Medical History or as stated below  Constitutional: NEGATIVE for fever, chills, change in weight  Skin: NEGATIVE for worrisome rashes, moles or lesions  GI/: NEGATIVE for bowel or bladder changes  Neuro: NEGATIVE for weakness, dizziness or paresthesias    OBJECTIVE:  /71  Ht (P) 5' 7.25\" (1.708 m)  Wt (P) 138 lb (62.6 kg)  BMI (P) 21.45 kg/m2   General: healthy, alert and in no distress  HEENT: no scleral icterus or conjunctival erythema  Skin: no suspicious lesions or rash. No jaundice.  CV: regular rhythm by palpation  Resp: normal respiratory effort without conversational dyspnea   Psych: normal mood and " affect  Gait: normal steady gait with appropriate coordination and balance  Neuro: Normal sensory exam of bilateral hands.   MSK:  RIGHT ELBOW   Inspection:    No swelling, bruising, discoloration, or obvious deformity or asymmetry  Palpation:    Tender about the Cubital . Remainder of bony, ligamentous and tendinous landmarks are nontender.    Crepitus is Absent  Range of Motion:     Extension full, limited by pain / flexion full / pronation full / supination full  Strength:    No deficits in flexion, extension, pronation, or supination.  Special Tests:    Positive: cubital tunnel (ulnar Tinel's), translocating ulnar nerve on the right    Negative: Pain with resisted wrist extension, pain with resisted middle finger extension, pain with resisted wrist flexion, pain with resisted supination    Independent visualization of the below image:  Recent Results (from the past 24 hour(s))   XR Elbow RT G/E 3 vw    Narrative    XR ELBOW RT G/E 3 VW 11/2/2018 1:09 PM     HISTORY: ; Elbow injury, right, initial encounter      Impression    IMPRESSION: Negative exam.    YANELY COSTA MD       Patient's conditions were thoroughly discussed during today's visit with greater than 50% of the visit spent counseling the patient with total time spent face-to-face with the patient being 30 minutes.    Chris Liu MD, [facility]

## 2018-11-23 ENCOUNTER — TELEPHONE (OUTPATIENT)
Dept: FAMILY MEDICINE | Facility: CLINIC | Age: 48
End: 2018-11-23

## 2018-11-23 NOTE — TELEPHONE ENCOUNTER
Patient is calling asking to have an appointment with provider for a HPV,IUD removal, and Pap all at one appointment.   Please call to advise  Thank you

## 2018-11-23 NOTE — TELEPHONE ENCOUNTER
Patient wondered if IUD removed if lidocaine will be applied.  Informed for removal, this is not needed.  Cervix is not being manipulated.   Verbalized good understanding.   Cassie Saleh RN

## 2018-12-11 NOTE — PROGRESS NOTES
"   SUBJECTIVE:   CC: An Cornejo is an 48 year old woman who presents for preventive health visit.     Healthy Habits:    Do you get at least three servings of calcium containing foods daily (dairy, green leafy vegetables, etc.)? { :146457::\"yes\"}    Amount of exercise or daily activities, outside of work: { :889676}    Problems taking medications regularly { :964592::\"No\"}    Medication side effects: { :578830::\"No\"}    Have you had an eye exam in the past two years? { :238814}    Do you see a dentist twice per year? { :092888}    Do you have sleep apnea, excessive snoring or daytime drowsiness?{ :240029}  {Outside tests to abstract? :702801}    {additional problems to add (Optional):207016}    Today's PHQ-2 Score:   PHQ-2 ( 1999 [employer]) 9/28/2018 12/6/2017   Q1: Little interest or pleasure in doing things 0 0   Q2: Feeling down, depressed or hopeless 0 0   PHQ-2 Score 0 0   Q1: Little interest or pleasure in doing things Not at all Not at all   Q2: Feeling down, depressed or hopeless Not at all Not at all   PHQ-2 Score 0 0     {PHQ-2 LOOK IN ASSESSMENTS (Optional) :230199}  Abuse: Current or Past(Physical, Sexual or Emotional)- {YES/NO/NA:041339}  Do you feel safe in your environment? {YES/NO/NA:187589}    Social History     Tobacco Use     Smoking status: Never Smoker     Smokeless tobacco: Never Used     Tobacco comment: nonsmoking household   Substance Use Topics     Alcohol use: Yes     Alcohol/week: 0.0 oz     Comment: 2 glasses of gin and tonic may be once per month     If you drink alcohol do you typically have >3 drinks per day or >7 drinks per week? {ETOH :437921}                     Reviewed orders with patient.  Reviewed health maintenance and updated orders accordingly - {Yes/No:261919::\"Yes\"}  {Chronicprobdata (Optional):378468}    {Mammo Decision Support (Optional):595606}    Pertinent mammograms are reviewed under the imaging tab.  History of abnormal Pap smear: {PAP HX:604575}  PAP / HPV Latest " "Ref Rng & Units 12/6/2017 10/31/2016 1/14/2014   PAP - LSIL(A) LSIL(A) NIL   HPV 16 DNA NEG:Negative Positive(A) Positive(A) -   HPV 18 DNA NEG:Negative Negative Negative -   OTHER HR HPV NEG:Negative Positive(A) Positive(A) -     Reviewed and updated as needed this visit by clinical staff         Reviewed and updated as needed this visit by Provider        {HISTORY OPTIONS (Optional):522554}    ROS:  { :255242}    OBJECTIVE:   There were no vitals taken for this visit.  EXAM:  {Exam Choices:158728}    {Diagnostic Test Results (Optional):613525::\"Diagnostic Test Results:\",\"none \"}    ASSESSMENT/PLAN:   {Diag Picklist:963893}    COUNSELING:   {FEMALE COUNSELING MESSAGES:693227::\"Reviewed preventive health counseling, as reflected in patient instructions\"}    BP Readings from Last 1 Encounters:   11/02/18 110/71     Estimated body mass index is 21.45 kg/m  (pended) as calculated from the following:    Height as of 11/2/18: (P) 1.708 m (5' 7.25\").    Weight as of 11/2/18: (P) 62.6 kg (138 lb).    {BP Counseling- Complete if BP >= 120/80  (Optional):147313}  {Weight Management Plan (ACO) Complete if BMI is abnormal-  Ages 18-64  BMI >24.9.  Age 65+ with BMI <23 or >30 (Optional):679036}     reports that  has never smoked. she has never used smokeless tobacco.  {Tobacco Cessation -- Complete if patient is a smoker (Optional):516748}    Counseling Resources:  ATP IV Guidelines  Pooled Cohorts Equation Calculator  Breast Cancer Risk Calculator  FRAX Risk Assessment  ICSI Preventive Guidelines  Dietary Guidelines for Americans, 2010  USDA's MyPlate  ASA Prophylaxis  Lung CA Screening    Melania Vega MD  [facility]  "

## 2018-12-18 ENCOUNTER — OFFICE VISIT (OUTPATIENT)
Dept: FAMILY MEDICINE | Facility: CLINIC | Age: 48
End: 2018-12-18
Payer: COMMERCIAL

## 2018-12-18 VITALS
TEMPERATURE: 98.3 F | DIASTOLIC BLOOD PRESSURE: 72 MMHG | HEART RATE: 74 BPM | WEIGHT: 142.6 LBS | SYSTOLIC BLOOD PRESSURE: 110 MMHG | RESPIRATION RATE: 14 BRPM | BODY MASS INDEX: 22.17 KG/M2

## 2018-12-18 DIAGNOSIS — R87.612 PAPANICOLAOU SMEAR OF CERVIX WITH LOW GRADE SQUAMOUS INTRAEPITHELIAL LESION (LGSIL): ICD-10-CM

## 2018-12-18 DIAGNOSIS — N63.0 LUMP OR MASS IN BREAST: ICD-10-CM

## 2018-12-18 DIAGNOSIS — Z00.00 ROUTINE GENERAL MEDICAL EXAMINATION AT A HEALTH CARE FACILITY: Primary | ICD-10-CM

## 2018-12-18 PROCEDURE — 87624 HPV HI-RISK TYP POOLED RSLT: CPT | Performed by: FAMILY MEDICINE

## 2018-12-18 PROCEDURE — 88141 CYTOPATH C/V INTERPRET: CPT | Performed by: FAMILY MEDICINE

## 2018-12-18 PROCEDURE — 88175 CYTOPATH C/V AUTO FLUID REDO: CPT | Performed by: FAMILY MEDICINE

## 2018-12-18 PROCEDURE — 99396 PREV VISIT EST AGE 40-64: CPT | Performed by: FAMILY MEDICINE

## 2018-12-18 ASSESSMENT — ENCOUNTER SYMPTOMS
PARESTHESIAS: 0
CHILLS: 0
WEAKNESS: 1
BREAST MASS: 1
HEADACHES: 1
HEARTBURN: 0
JOINT SWELLING: 0
NAUSEA: 0
SORE THROAT: 0
ABDOMINAL PAIN: 1
NERVOUS/ANXIOUS: 0
ARTHRALGIAS: 1
PALPITATIONS: 0
FEVER: 0
SHORTNESS OF BREATH: 0
HEMATURIA: 0
DIARRHEA: 0
HEMATOCHEZIA: 0
CONSTIPATION: 1
COUGH: 0
MYALGIAS: 1
DIZZINESS: 0
DYSURIA: 0
EYE PAIN: 0
FREQUENCY: 0

## 2018-12-18 NOTE — PROGRESS NOTES
SUBJECTIVE:   CC: An Cornejo is an 48 year old woman who presents for preventive health visit.     Physical   Annual:     Getting at least 3 servings of Calcium per day:  Yes    Bi-annual eye exam:  Yes    Dental care twice a year:  Yes    Sleep apnea or symptoms of sleep apnea:  Daytime drowsiness    Diet:  Regular (no restrictions)    Frequency of exercise:  1 day/week    Duration of exercise:  Less than 15 minutes    Taking medications regularly:  Yes    Additional concerns today:  Yes    PHQ-2 Total Score: 0              Today's PHQ-2 Score:   PHQ-2 ( 1999 Pf[employer]) 12/18/2018   Q1: Little interest or pleasure in doing things 0   Q2: Feeling down, depressed or hopeless 0   PHQ-2 Score 0   Q1: Little interest or pleasure in doing things Not at all   Q2: Feeling down, depressed or hopeless Not at all   PHQ-2 Score 0       Abuse: Current or Past(Physical, Sexual or Emotional)- No  Do you feel safe in your environment? Yes    Social History     Tobacco Use     Smoking status: Never Smoker     Smokeless tobacco: Never Used     Tobacco comment: nonsmoking household   Substance Use Topics     Alcohol use: Yes     Alcohol/week: 0.0 oz     Comment: 2 glasses of gin and tonic may be once per month     Alcohol Use 12/18/2018   If you drink alcohol do you typically have greater than 3 drinks per day OR greater than 7 drinks per week? No   No flowsheet data found.    Reviewed orders with patient.  Reviewed health maintenance and updated orders accordingly - Yes    G 3 P 2   No LMP recorded. Patient is not currently having periods (Reason: UNKNOWN).     Fasting: No   Td: tdap 7/2011       Last 3 Pap and HPV Results:   PAP / HPV Latest Ref Rng & Units 12/6/2017 10/31/2016 1/14/2014   PAP - LSIL(A) LSIL(A) NIL   HPV 16 DNA NEG:Negative Positive(A) Positive(A) -   HPV 18 DNA NEG:Negative Negative Negative -   OTHER HR HPV NEG:Negative Positive(A) Positive(A) -                  Cholesterol:   Lab Results   Component Value  Date    CHOL 207 09/21/2018     Lab Results   Component Value Date    HDL 80 09/21/2018     Lab Results   Component Value Date     09/21/2018     Lab Results   Component Value Date    TRIG 80 09/21/2018     Lab Results   Component Value Date    CHOLHDLRATIO 2.2 10/13/2014         MMG: ordered today  Dexa:  NA     Flex/colo: NA      Seat Belt: Yes    Sunscreen use: Yes   Calcium Intake: adeq   Health Care Directive: No  Sexually Active: Yes     Current contraception: none  History of abnormal Pap smear: Yes: see prob list  Family history of colon/breast/ovarian cancer: Yes: see [facility]  Regular self breast exam: Yes  History of abnormal mammogram: No      Labs reviewed in EPIC  BP Readings from Last 3 Encounters:   12/18/18 110/72   11/02/18 110/71   09/28/18 108/72    Wt Readings from Last 3 Encounters:   12/18/18 64.7 kg (142 lb 9.6 oz)   11/02/18 (P) 62.6 kg (138 lb)   09/28/18 62.9 kg (138 lb 9.6 oz)                  Patient Active Problem List   Diagnosis     CARDIOVASCULAR SCREENING; LDL GOAL LESS THAN 160     Anxiety     Mild persistent asthma     Sore throat (viral)     Asthma, exercise induced     Exposure to pertussis     Rectocele     KATIE (stress urinary incontinence, female)     Papanicolaou smear of cervix with low grade squamous intraepithelial lesion (LGSIL)     Past Surgical History:   Procedure Laterality Date     BREAST LUMPECTOMY, RT/LT Left     Benign lesion as per patient     COLPORRHAPHY POSTERIOR N/A 10/21/2014    Procedure: COLPORRHAPHY POSTERIOR;  Surgeon: Fabian Harding MD;  Location: [facility]     CYSTOSCOPY, SLING TRANSVAGINAL N/A 10/21/2014    Procedure: CYSTOSCOPY, SLING TRANSVAGINAL;  Surgeon: Fabian Harding MD;  [facility]     EXTRACTION(S) DENTAL       PERINEORRHAPHY N/A 10/21/2014    Procedure: PERINEORRHAPHY;  Surgeon: Fabian Harding MD;  Location: [facility]     RECONSTRUCT BREAST BILATERAL         Social History     Tobacco Use     Smoking status: Never Smoker      Smokeless tobacco: Never Used     Tobacco comment: nonsmoking household   Substance Use Topics     Alcohol use: Yes     Alcohol/week: 0.0 oz     Comment: 2 glasses of gin and tonic may be once per month     Family History   Problem Relation Age of Onset     Depression Mother      Hypertension Mother      Lipids Mother      Arthritis Mother      Asthma Mother      Pulmonary Embolism Mother      Breast Cancer Maternal Grandmother         postmenopausal     Cancer Maternal Grandmother      Respiratory Paternal Grandfather         emphysema     Hypertension Father      Cancer Sister         Thyroid     Neurologic Disorder Sister         MS         Current Outpatient Medications   Medication Sig Dispense Refill     levonorgestrel (MIRENA) 20 MCG/24HR IUD 1 each by Intrauterine route once Reported on 4/14/2017       verapamil (CALAN) 40 MG tablet Take 1 tablet (40 mg) by mouth 3 times daily 90 tablet 1     albuterol (PROAIR HFA/PROVENTIL HFA/VENTOLIN HFA) 108 (90 Base) MCG/ACT inhaler Inhale 2 puffs into the lungs every 4 hours as needed for shortness of breath / dyspnea (Patient not taking: Reported on 12/18/2018) 1 Inhaler 2     ALPRAZolam (XANAX) 0.5 MG tablet Take 1 tablet (0.5 mg) by mouth nightly as needed for sleep (Patient not taking: Reported on 12/18/2018) 90 tablet 1     cyclobenzaprine (FLEXERIL) 10 MG tablet Take 1 tablet (10 mg) by mouth nightly as needed for muscle spasms (Patient not taking: Reported on 12/18/2018) 30 tablet 1     fluticasone (FLONASE) 50 MCG/ACT spray 2 SPRAYS EACH NOSTRIL TWICE DAILY, FOR 1 WEEK, THEN 2 SPRAYS EACH NOSTRIL 1 TIME DAILY (Patient not taking: Reported on 1/17/2018) 16 mL 1     ipratropium (ATROVENT) 0.06 % spray Spray 2 sprays into both nostrils 4 times daily as needed (Patient not taking: Reported on 1/17/2018) 1 Box 11     rizatriptan (MAXALT) 10 MG tablet Take 1 tablet (10 mg) by mouth at onset of headache for migraine May repeat in 2 hours. Max 3 tablets/24 hours.  (Patient not taking: Reported on 12/18/2018) 18 tablet 1       Mammogram Screening: Patient over age 50, mutual decision to screen reflected in health maintenance.    Pertinent mammograms are reviewed under the imaging tab.  Reviewed and updated as needed this visit by clinical staff         Reviewed and updated as needed this visit by Provider            Review of Systems   Constitutional: Negative for chills and fever.   HENT: Positive for congestion and hearing loss. Negative for ear pain and sore throat.    Eyes: Negative for pain and visual disturbance.   Respiratory: Negative for cough and shortness of breath.    Cardiovascular: Negative for chest pain, palpitations and peripheral edema.   Gastrointestinal: Positive for abdominal pain and constipation. Negative for diarrhea, heartburn, hematochezia and nausea.   Breasts:  Positive for breast mass. Negative for tenderness and discharge.   Genitourinary: Negative for dysuria, frequency, genital sores, hematuria, pelvic pain, urgency, vaginal bleeding and vaginal discharge.   Musculoskeletal: Positive for arthralgias and myalgias. Negative for joint swelling.   Skin: Negative for rash.   Neurological: Positive for weakness and headaches. Negative for dizziness and paresthesias.   Psychiatric/Behavioral: Negative for mood changes. The patient is not nervous/anxious.           OBJECTIVE:   There were no vitals taken for this visit.  Physical Exam  GENERAL: healthy, alert and no distress  EYES: Eyes grossly normal to inspection, PERRL and conjunctivae and sclerae normal  HENT: ear canals and TM's normal, nose and mouth without ulcers or lesions  NECK: no adenopathy, no asymmetry, masses, or scars and thyroid normal to palpation  RESP: lungs clear to auscultation - no rales, rhonchi or wheezes  BREAST:right breast with firm 1 cm mass at 11:30 position at areolar border, no ttp,  left breast normal without masses, tenderness or nipple discharge and no palpable  "axillary masses or adenopathy  CV: regular rate and rhythm, normal S1 S2, no S3 or S4, no murmur, click or rub, no peripheral edema and peripheral pulses strong  ABDOMEN: soft, nontender, no hepatosplenomegaly, no masses and bowel sounds normal   (female): normal female external genitalia, normal urethral meatus, vaginal mucosa pink, moist, well rugated, and normal cervix/adnexa/uterus without masses or discharge  MS: no gross musculoskeletal defects noted, no edema  SKIN: no suspicious lesions or rashes  NEURO: Normal strength and tone, mentation intact and speech normal  PSYCH: mentation appears normal, affect normal/bright    Diagnostic Test Results:  none     ASSESSMENT/PLAN:   (Z00.00) Routine general medical examination at a health care facility  (primary encounter diagnosis)  Comment: preventive needs reviewed  Plan: see orders in Epic.     (N63.0) Lump or mass in breast  Comment: new finding  Plan: MA Diagnostic Digital Bilateral            (R87.612) Papanicolaou smear of cervix with low grade squamous intraepithelial lesion (LGSIL)  Comment: due  Plan: Pap imaged thin layer diagnostic with HPV         (select HPV order below), HPV High Risk Types         DNA Cervical              COUNSELING:  Reviewed preventive health counseling, as reflected in patient instructions  Special attention given to:        Regular exercise       Healthy diet/nutrition    BP Readings from Last 1 Encounters:   11/02/18 110/71     Estimated body mass index is 21.45 kg/m  (pended) as calculated from the following:    Height as of 11/2/18: (P) 1.708 m (5' 7.25\").    Weight as of 11/2/18: (P) 62.6 kg (138 lb).           reports that  has never smoked. she has never used smokeless tobacco.      Counseling Resources:  ATP IV Guidelines  Pooled Cohorts Equation Calculator  Breast Cancer Risk Calculator  FRAX Risk Assessment  ICSI Preventive Guidelines  Dietary Guidelines for Americans, 2010  USDA's MyPlate  ASA Prophylaxis  Lung CA " Screening    Melania Vega MD  [facility]

## 2018-12-18 NOTE — NURSING NOTE
"Chief Complaint   Patient presents with     Physical       Initial /72   Pulse 74   Temp 98.3  F (36.8  C) (Oral)   Resp 14   Wt 64.7 kg (142 lb 9.6 oz)   BMI (P) 22.17 kg/m   Estimated body mass index is 22.17 kg/m  (pended) as calculated from the following:    Height as of 11/2/18: (P) 1.708 m (5' 7.25\").    Weight as of this encounter: 64.7 kg (142 lb 9.6 oz).  Medication Reconciliation: complete  Pedro Buckner CMA    "

## 2018-12-18 NOTE — LETTER
My Migraine Action Plan      Date: 12/11/2018     My Name: An Cornejo   YOB: 1970  [id number] [place] [facility] [address] AT [facility] OF [facility]       My (Preventative) Control Medicine: verapamil        My Rescue Medicine: maxalt   My Doctor: Melania Vega     My Clinic: [facility]  [address] [facility] [address] [phone]        GREEN ZONE = Good Control    My headache plan is working.   I can do what I need to do.           I WILL:     ? Keep managing my triggers.  ? Write in my migraine diary each time I have a headache.  ? Keep taking my preventive (controller) medicine daily.  ? Take my relief and rescue medicine as needed.             YELLOW ZONE = Not Enough Control    My headache plan isn t always working.   My headaches keep me from doing   some of the things I need to do.       I WILL:     ? Set goals to control my triggers and act on them.  ? Write in my migraine diary each time I have a headache and review it for                      patterns or new triggers.  ? Keep taking my preventive (controller) medicine daily.  ? Take my relief and rescue medicine as needed.  ? Call my doctor or clinic at if I stay in the Yellow Zone.             RED ZONE = Poor or No Control    My headache plan has  failed. I can t do anything  when I have one. My  medicines aren t working.           I WILL:   ? Set goals to control my triggers and act on them.  ? Write in my migraine diary each time I have a headache and review it for                      patterns or new triggers.  ? Keep taking my preventive (controller) medicine daily.  ? Take my relief and rescue medicine as needed.  ? Call my doctor or clinic or go to urgent care or an ER if I m having the worst                  headache of my life.  ? Call my doctor or clinic or go to urgent care or an ER if my medicine doesn t work.  ? Let my doctor or clinic know within  2 weeks if I have gone to an urgent care or             emergency department.          Provider specific instructions:

## 2018-12-18 NOTE — LETTER
My Asthma Action Plan  Name: An Cornejo   YOB: 1970  Date: 12/11/2018   My doctor: Melania Vega MD   My clinic: [facility]        My Control Medicine: none  My Rescue Medicine: Albuterol (Proair/Ventolin/Proventil) inhaler as needed   My Asthma Severity: intermittent  Avoid your asthma triggers: upper respiratory infections               GREEN ZONE   Good Control    I feel good    No cough or wheeze    Can work, sleep and play without asthma symptoms       Take your asthma control medicine every day.     1. If exercise triggers your asthma, take your rescue medication    15 minutes before exercise or sports, and    During exercise if you have asthma symptoms  2. Spacer to use with inhaler: If you have a spacer, make sure to use it with your inhaler             YELLOW ZONE Getting Worse  I have ANY of these:    I do not feel good    Cough or wheeze    Chest feels tight    Wake up at night   1. Keep taking your Green Zone medications  2. Start taking your rescue medicine:    every 20 minutes for up to 1 hour. Then every 4 hours for 24-48 hours.  3. If you stay in the Yellow Zone for more than 12-24 hours, contact your doctor.  4. If you do not return to the Green Zone in 12-24 hours or you get worse, start taking your oral steroid medicine if prescribed by your provider.           RED ZONE Medical Alert - Get Help  I have ANY of these:    I feel awful    Medicine is not helping    Breathing getting harder    Trouble walking or talking    Nose opens wide to breathe       1. Take your rescue medicine NOW  2. If your provider has prescribed an oral steroid medicine, start taking it NOW  3. Call your doctor NOW  4. If you are still in the Red Zone after 20 minutes and you have not reached your doctor:    Take your rescue medicine again and    Call 911 or go to the emergency room right away    See your regular doctor within 2 weeks of an Emergency Room or Urgent Care visit for  follow-up treatment.          Annual Reminders:  Meet with Asthma Educator,  Flu Shot in the Fall, consider Pneumonia Vaccination for patients with asthma (aged 19 and older).    Pharm[id number] [address] [facility]                      Asthma Triggers  How To Control Things That Make Your Asthma Worse    Triggers are things that make your asthma worse.  Look at the list below to help you find your triggers and what you can do about them.  You can help prevent asthma flare-ups by staying away from your triggers.      Trigger                                                          What you can do   Cigarette Smoke  Tobacco smoke can make asthma worse. Do not allow smoking in your home, car or around you.  Be sure no one smokes at a child s day care or school.  If you smoke, ask your health care provider for ways to help you quit.  Ask family members to quit too.  Ask your health care provider for a referral to Quit Plan to help you quit smoking, or call 5-385-280-PLAN.     Colds, Flu, Bronchitis  These are common triggers of asthma. Wash your hands often.  Don t touch your eyes, nose or mouth.  Get a flu shot every year.     Dust Mites  These are tiny bugs that live in cloth or carpet. They are too small to see. Wash sheets and blankets in hot water every week.   Encase pillows and mattress in dust mite proof covers.  Avoid having carpet if you can. If you have carpet, vacuum weekly.   Use a dust mask and HEPA vacuum.   Pollen and Outdoor Mold  Some people are allergic to trees, grass, or weed pollen, or molds. Try to keep your windows closed.  Limit time out doors when pollen count is high.   Ask you health care provider about taking medicine during allergy season.     Animal Dander  Some people are allergic to skin flakes, urine or saliva from pets with fur or feathers. Keep pets with fur or feathers out of your home.    If you can t keep the pet  outdoors, then keep the pet out of your bedroom.  Keep the bedroom door closed.  Keep pets off cloth furniture and away from stuffed toys.     Mice, Rats, and Cockroaches  Some people are allergic to the waste from these pests.   Cover food and garbage.  Clean up spills and food crumbs.  Store grease in the refrigerator.   Keep food out of the bedroom.   Indoor Mold  This can be a trigger if your home has high moisture. Fix leaking faucets, pipes, or other sources of water.   Clean moldy surfaces.  Dehumidify basement if it is damp and smelly.   Smoke, Strong Odors, and Sprays  These can reduce air quality. Stay away from strong odors and sprays, such as perfume, powder, hair spray, paints, smoke incense, paint, cleaning products, candles and new carpet.   Exercise or Sports  Some people with asthma have this trigger. Be active!  Ask your doctor about taking medicine before sports or exercise to prevent symptoms.    Warm up for 5-10 minutes before and after sports or exercise.     Other Triggers of Asthma  Cold air:  Cover your nose and mouth with a scarf.  Sometimes laughing or crying can be a trigger.  Some medicines and food can trigger asthma.

## 2018-12-21 ENCOUNTER — ANCILLARY PROCEDURE (OUTPATIENT)
Dept: MAMMOGRAPHY | Facility: CLINIC | Age: 48
End: 2018-12-21
Attending: FAMILY MEDICINE
Payer: COMMERCIAL

## 2018-12-21 ENCOUNTER — ANCILLARY PROCEDURE (OUTPATIENT)
Dept: ULTRASOUND IMAGING | Facility: CLINIC | Age: 48
End: 2018-12-21
Attending: FAMILY MEDICINE
Payer: COMMERCIAL

## 2018-12-21 DIAGNOSIS — N63.0 LUMP OR MASS IN BREAST: ICD-10-CM

## 2018-12-21 LAB
COPATH REPORT: ABNORMAL
PAP: ABNORMAL

## 2018-12-21 PROCEDURE — 77066 DX MAMMO INCL CAD BI: CPT | Performed by: STUDENT IN AN ORGANIZED HEALTH CARE EDUCATION/TRAINING PROGRAM

## 2018-12-21 PROCEDURE — 76642 ULTRASOUND BREAST LIMITED: CPT | Mod: 50 | Performed by: STUDENT IN AN ORGANIZED HEALTH CARE EDUCATION/TRAINING PROGRAM

## 2018-12-24 LAB
FINAL DIAGNOSIS: ABNORMAL
HPV HR 12 DNA CVX QL NAA+PROBE: POSITIVE
HPV16 DNA SPEC QL NAA+PROBE: POSITIVE
HPV18 DNA SPEC QL NAA+PROBE: NEGATIVE
SPECIMEN DESCRIPTION: ABNORMAL
SPECIMEN SOURCE CVX/VAG CYTO: ABNORMAL

## 2019-01-07 ENCOUNTER — OFFICE VISIT (OUTPATIENT)
Dept: OBGYN | Facility: CLINIC | Age: 49
End: 2019-01-07
Payer: COMMERCIAL

## 2019-01-07 VITALS — SYSTOLIC BLOOD PRESSURE: 107 MMHG | OXYGEN SATURATION: 98 % | DIASTOLIC BLOOD PRESSURE: 74 MMHG | HEART RATE: 85 BPM

## 2019-01-07 DIAGNOSIS — R87.612 PAPANICOLAOU SMEAR OF CERVIX WITH LOW GRADE SQUAMOUS INTRAEPITHELIAL LESION (LGSIL): Primary | ICD-10-CM

## 2019-01-07 PROCEDURE — 57454 BX/CURETT OF CERVIX W/SCOPE: CPT | Performed by: OBSTETRICS & GYNECOLOGY

## 2019-01-07 PROCEDURE — 88305 TISSUE EXAM BY PATHOLOGIST: CPT | Performed by: OBSTETRICS & GYNECOLOGY

## 2019-01-07 PROCEDURE — 99242 OFF/OP CONSLTJ NEW/EST SF 20: CPT | Mod: 25 | Performed by: OBSTETRICS & GYNECOLOGY

## 2019-01-07 PROCEDURE — 88342 IMHCHEM/IMCYTCHM 1ST ANTB: CPT | Performed by: OBSTETRICS & GYNECOLOGY

## 2019-01-07 NOTE — PATIENT INSTRUCTIONS
If you have any questions regarding your visit, Please contact your care team.    [facility]: [phone]      [facility] CLINIC HOURS TELEPHONE NUMBER   Cephas Agbeh, M.D.    [place] -     Lalitha Castro             Monday-[place]    8:00a.m-4:45 p.m    Tuesday--[place]     8:00a.m-4:45 p.m.    Thursday-[place]    8:00a.m-4:45 p.m.    Friday-[place]    8:00a.m-4:45 p.m    [facility]   [address] [place] [address]   [phone]-Ask for [facility]   Fax [phone]   Fidzkrq-[phone]     [facility]   [address] [place] [address] [place] [address]   [phone]     [facility]   [address] [facility] [address]   [phone]   Cvoguoi-[phone]   Urgent Care locations:    [facility]  Monday-Friday   5 pm - 9 pm   Saturday and Sunday    9 am - 5 pm      Monday-Friday    11 pm - 9 pm  Saturday and Sunday   9 am - 5 pm    [phone]     If you need a medication refill, please contact your pharmacy. Please allow 3 business days for your refill to be completed.  As always, Thank you for trusting us with your healthcare needs!

## 2019-01-07 NOTE — PROGRESS NOTES
"Patient Name: An Nameny              Date: 1/7/2019   YOB: 1970                         Age: 48 year old   Phone: [phone] (home)   ________________________________________________________________________  I have been asked to see An in consultation by Dr. PHILLIP REDDY  to discuss the pap smear, findings and possible further evaluation.  The patient's pap smear history is as noted:  Noted:  10/31/2016    Priority:  Medium    Overview Addendum 12/25/2018  9:20 PM by Blayne Lind RN   10/31/16: [facility] Pap, + HR HPV 16 & other HR HPV. Plan colp  11/28/16: [place] ECC - benign. Plan cotest in 1 year.   12/6/17 [facility] Pap, + HR HPV 16 & other HR HPV. Plan colp  1/17/18 [place] no bx- Normal exam without visible pathology    Plan: Cotest in 1yr due by 12/6/18 12/18/18 [facility] pap, + HR HPV 16 and + HR HPV other. Plan colp.        I attempted to ensure that the patient was educated regarding the nature of her findings and implications to date.  We reviewed the role of HPV, incidence in the population and the natural history of the infection, and its transmission.  We also reviewed ways to minimize her future risk, the effect of HPV on the cervix and treatment options available, should they be indicated.      No LMP recorded. Patient is not currently having periods (Reason: IUD).  Current Birth Control Method: post menopausal status      Past Medical History:   Diagnosis Date     Anxiety     Use medication as needed     Asthma     Uses inhaler as needed     Cervical high risk HPV (human papillomavirus) test positive 12/18/2018    See problem list     Episodic tension type headache     Started when she was in her 30's intermittent     IUD     Mirena inserted 6/5/13     Neck pain     Both side of neck     Papanicolaou smear of cervix with low grade squamous intraepithelial lesion (LGSIL) 10/31/16, 12/18/18    See problem list     Seizures (H) infancy    \"grew out of it\"     Status post colposcopy 11/28/16    ECC - " negative     Upper back pain     Over 5 years or more       Past Surgical History:   Procedure Laterality Date     BREAST LUMPECTOMY, RT/LT Left     Benign lesion as per patient     COLPORRHAPHY POSTERIOR N/A 10/21/2014    Procedure: COLPORRHAPHY POSTERIOR;  Surgeon: Fabian Harding MD;  [facility]     CYSTOSCOPY, SLING TRANSVAGINAL N/A 10/21/2014    Procedure: CYSTOSCOPY, SLING TRANSVAGINAL;  Surgeon: Fabian Harding MD;  [facility]     EXTRACTION(S) DENTAL       PERINEORRHAPHY N/A 10/21/2014    Procedure: PERINEORRHAPHY;  Surgeon: Fabian Harding MD;  [facility]     RECONSTRUCT BREAST BILATERAL          Outpatient Encounter Medications as of 1/7/2019   Medication Sig Dispense Refill     levonorgestrel (MIRENA) 20 MCG/24HR IUD 1 each by Intrauterine route once Reported on 4/14/2017       verapamil (CALAN) 40 MG tablet Take 1 tablet (40 mg) by mouth 3 times daily 270 tablet 1     verapamil (CALAN) 40 MG tablet Take 1 tablet (40 mg) by mouth 3 times daily 90 tablet 1     albuterol (PROAIR HFA/PROVENTIL HFA/VENTOLIN HFA) 108 (90 Base) MCG/ACT inhaler Inhale 2 puffs into the lungs every 4 hours as needed for shortness of breath / dyspnea (Patient not taking: Reported on 12/18/2018) 1 Inhaler 2     ALPRAZolam (XANAX) 0.5 MG tablet Take 1 tablet (0.5 mg) by mouth nightly as needed for sleep (Patient not taking: Reported on 12/18/2018) 90 tablet 1     cyclobenzaprine (FLEXERIL) 10 MG tablet Take 1 tablet (10 mg) by mouth nightly as needed for muscle spasms (Patient not taking: Reported on 12/18/2018) 30 tablet 1     fluticasone (FLONASE) 50 MCG/ACT spray 2 SPRAYS EACH NOSTRIL TWICE DAILY, FOR 1 WEEK, THEN 2 SPRAYS EACH NOSTRIL 1 TIME DAILY (Patient not taking: Reported on 1/17/2018) 16 mL 1     ipratropium (ATROVENT) 0.06 % spray Spray 2 sprays into both nostrils 4 times daily as needed (Patient not taking: Reported on 1/17/2018) 1 Box 11     rizatriptan (MAXALT) 10 MG tablet Take 1 tablet (10 mg)  by mouth at onset of headache for migraine May repeat in 2 hours. Max 3 tablets/24 hours. (Patient not taking: Reported on 12/18/2018) 18 tablet 1     No facility-administered encounter medications on file as of 1/7/2019.         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 [employer]   Tobacco Use     Smoking status: Never Smoker     Smokeless tobacco: Never Used     Tobacco comment: nonsmoking household   Substance and Sexual Activity     Alcohol use: Yes     Alcohol/week: 0.0 oz     Comment: 2 glasses of gin and tonic may be once per month     Drug use: No     Sexual activity: Yes     Partners: Male     Birth control/protection: IUD   Other Topics Concern     Parent/sibling w/ CABG, MI or angioplasty before 65F 55M? Not Asked   Social History Narrative     None        Family History   Problem Relation Age of Onset     Depression Mother      Hypertension Mother      Lipids Mother      Arthritis Mother      Asthma Mother      Pulmonary Embolism Mother      Breast Cancer Maternal Grandmother         postmenopausal     Cancer Maternal Grandmother      Respiratory Paternal Grandfather         emphysema     Hypertension Father      Cancer Sister         Thyroid     Neurologic Disorder Sister         MS         Review Of Systems  10 point ROS of systems including Constitutional, Eyes, Respiratory, Cardiovascular, Gastroenterology, Genitourinary, Integumentary,  Muscularskeletal, Psychiatric were all negative except for pertinent positives noted in my HPI and in the PMH.      Exam:   /74 (BP Location: Left arm, Patient Position: Chair, Cuff Size: Adult Regular)   Pulse 85   SpO2 98%   Breastfeeding? No   GENERAL:  WNWD female NAD  HEENT: NC/AT, EOMI  Lungs:  Good respiratory effort   SKIN: normal skin turgor  GAIT: Normal  NECK: Symmetrical, no masses noted   VULVA: Normal Genitalia  BUS: Normal  URETHRA:  No hypermobility noted  URETHRAL MEATUS:  No masses noted  VAGINA: Normal mucosa, no discharge  CERVIX: Closed, mobile, no discharge  PERIANAL:  No masses or lesions seen  EXTREMITIES: no clubbing, cyanosis, or edema    Assessment:  LGSIL, HPV 16    Plan:  Recommend to Proceed with Colpo  The details of the colposcopic procedure were reviewed, the risks of missed diagnoses, pain, infection, and bleeding.          Procedure:  Procedure for colposcopy and biopsy has been explained to the patient and consent obtained.    Before the procedure, it was ensured that the patient was educated regarding the nature of her findings and implications to date.  We reviewed the role of HPV and the natural history of the infection.  We also reviewed ways to minimize her future risk, the effect of HPV on the cervix and treatment options available, should they be indicated.    The pathophysiology of the cervix, including a discussion of the squamous and columnar cells, metaplasia and dysplasia have been reviewed, drawings, sketches and the pamphlets were reviewed with her.  The details of the colposcopic procedure were reviewed, the risks of missed diagnoses, pain, infection, and bleeding.  Questions seemed to be answered before proceeding and the patient then consented to the procedure.     Speculum placed in vagina and excellent visualization of cervix achieved, cervix swabbed  with acetic acid solution.    biopsies taken (including ECC): 2   Hemostasis effected with Silver  Nitrate.     Findings:    Cervix: acetowhitening noted 12 oclock  Vaginal inspection: no visible lesions.  Procedure Summary: Patient tolerated procedure well.      Assessment:     ICD-10-CM    1. Papanicolaou smear of cervix with low grade squamous intraepithelial lesion (LGSIL) R87.612 COLP CERVIX/UPPER VAGINA W BX CERVIX/ENDOCERV CURETT     Surgical pathology exam         Plan:  Specimens labelled and sent to pathology.  Will base further treatment on pathology findings.  Post biopsy instructions given to patient and call to discuss Pathology results.  She is leaning towards LEEP since is persistent for 3 years.  Cotest in 1 year is also an option if not High Grade lesion.  TT 30 min, in addition to the time for the procedure  CT greater than 50%, as noted above in the HPI and in the Plan.     CEPHAS AGBEH, MD.

## 2019-01-09 LAB — COPATH REPORT: NORMAL

## 2019-01-14 ENCOUNTER — OFFICE VISIT (OUTPATIENT)
Dept: OBGYN | Facility: CLINIC | Age: 49
End: 2019-01-14
Payer: COMMERCIAL

## 2019-01-14 VITALS
DIASTOLIC BLOOD PRESSURE: 76 MMHG | WEIGHT: 142 LBS | SYSTOLIC BLOOD PRESSURE: 111 MMHG | BODY MASS INDEX: 22.08 KG/M2 | TEMPERATURE: 98.6 F | HEART RATE: 84 BPM

## 2019-01-14 DIAGNOSIS — N87.1 DYSPLASIA OF CERVIX, HIGH GRADE CIN 2: Primary | ICD-10-CM

## 2019-01-14 PROCEDURE — 88305 TISSUE EXAM BY PATHOLOGIST: CPT | Performed by: OBSTETRICS & GYNECOLOGY

## 2019-01-14 PROCEDURE — 88307 TISSUE EXAM BY PATHOLOGIST: CPT | Performed by: OBSTETRICS & GYNECOLOGY

## 2019-01-14 PROCEDURE — 57460 BX OF CERVIX W/SCOPE LEEP: CPT | Performed by: OBSTETRICS & GYNECOLOGY

## 2019-01-14 RX ORDER — METRONIDAZOLE 500 MG/1
500 TABLET ORAL 3 TIMES DAILY
Qty: 10 TABLET | Refills: 0 | Status: SHIPPED | OUTPATIENT
Start: 2019-01-14 | End: 2020-05-01

## 2019-01-14 NOTE — PROGRESS NOTES
Problem Detail     Noted:  10/31/2016    Priority:  Medium    Overview Addendum 1/10/2019  1:16 PM by Shauna Díaz RN   10/31/16: [facility]IL Pap, + HR HPV 16 & other HR HPV. Plan colp  11/28/16: [place] ECC - benign. Plan cotest in 1 year.   12/6/17 [facility] Pap, + HR HPV 16 & other HR HPV. Plan colp  1/17/18 [place] no bx- Normal exam without visible pathology    Plan: Cotest in 1yr due by 12/6/18 12/18/18 [facility] pap, + HR HPV 16 and + HR HPV other. Plan colp.   1/7/19 [place] Bx - BARB 2, ECC - HSIL. Plan LEEP     /76   Pulse 84   Temp 98.6  F (37  C) (Tympanic)   Wt 64.4 kg (142 lb)   Breastfeeding? Yes   BMI (P) 22.08 kg/m      The patient and I discussed the procedure and the indications.   We reviewed the risks, benefits, goals, and limitations, as well as the potential complications.  Alternatives were also reviewed.  The anticipated post-op course, including the success and failure rates, limitations and consequences, was reviewed.  The potential for fertility issues, 1/5 risk, was also reviewed.  She voiced her understanding and she desired to proceed with the procedure.   After appropriate preparation, the colposcopic findings were noted.  20  cc of 1% lidocaine with epinephrine, was injected into the cervix.  Using the wire loop and a blended current, the excision was performed with the specimen as labeled removing the transformation zone and the anticipated lesion with satisfactory margins and depth.  Endocervical curettings were obtained.  Light fulguration was performed and Monsel's solution/paste was applied.  No apparent complications.  Blood losse was 5 cc  Patient tolerated the procedure well.    Patient was stable at discharge.  Await results.  Results will be called to patient or sent to her.  Instructions and information were given to the patient.    Follow up and repeat pap smears were discussed.    ICD-10-CM    1. Dysplasia of cervix, high grade BARB 2 N87.1 metroNIDAZOLE (FLAGYL) 500 MG tablet      COLP CERVIX/UPPER VAGINA W LOOP ELEC BX CERVIX     Surgical pathology exam     CEPHAS AGBEH, MD.

## 2019-01-14 NOTE — PATIENT INSTRUCTIONS
If you have any questions regarding your visit, Please contact your care team.    [facility]: [phone]      [facility] CLINIC HOURS TELEPHONE NUMBER   Cephas Agbeh, M.D.    EDELMIRA Strickland,     EV[place] Doty, [place]             Monday-[place]    8:00a.m-4:45 p.m    Tuesday--[place]     8:00a.m-4:45 p.m.    Thursday-[place]    8:00a.m-4:45 p.m.    Friday-[place]    8:00a.m-4:45 p.m    [facility]   [address]   [place] [address] [phone]-Ask for [facility]   Fax [phone]   [phone]     [facility]   [address] [place] [address] [address].   [place], [place] [address] [phone]     [facility]   [address] [facility] [id number] [phone]   [phone]    Urgent Care locations:    [facility]  Monday-Friday   5 pm - 9 pm   Saturday and Sunday    9 am - 5 pm      Monday-Friday    11 pm - 9 pm  Saturday and Sunday   9 am - 5 pm    [phone]     If you need a medication refill, please contact your pharmacy. Please allow 3 business days for your refill to be completed.  As always, Thank you for trusting us with your healthcare needs!

## 2019-01-17 LAB — COPATH REPORT: NORMAL

## 2019-05-31 ENCOUNTER — TRANSFERRED RECORDS (OUTPATIENT)
Dept: HEALTH INFORMATION MANAGEMENT | Facility: CLINIC | Age: 49
End: 2019-05-31

## 2019-07-22 ENCOUNTER — ANCILLARY PROCEDURE (OUTPATIENT)
Dept: ULTRASOUND IMAGING | Facility: CLINIC | Age: 49
End: 2019-07-22
Attending: FAMILY MEDICINE
Payer: COMMERCIAL

## 2019-07-22 ENCOUNTER — ANCILLARY PROCEDURE (OUTPATIENT)
Dept: MAMMOGRAPHY | Facility: CLINIC | Age: 49
End: 2019-07-22
Attending: FAMILY MEDICINE
Payer: COMMERCIAL

## 2019-07-22 DIAGNOSIS — N63.0 LUMP OR MASS IN BREAST: ICD-10-CM

## 2019-07-22 PROCEDURE — 77065 DX MAMMO INCL CAD UNI: CPT | Performed by: RADIOLOGY

## 2019-07-22 PROCEDURE — G0279 TOMOSYNTHESIS, MAMMO: HCPCS | Performed by: RADIOLOGY

## 2019-07-22 PROCEDURE — 76642 ULTRASOUND BREAST LIMITED: CPT | Mod: LT | Performed by: RADIOLOGY

## 2019-11-22 ENCOUNTER — TRANSFERRED RECORDS (OUTPATIENT)
Dept: HEALTH INFORMATION MANAGEMENT | Facility: CLINIC | Age: 49
End: 2019-11-22

## 2019-11-22 ENCOUNTER — OFFICE VISIT (OUTPATIENT)
Dept: ORTHOPEDICS | Facility: CLINIC | Age: 49
End: 2019-11-22
Payer: COMMERCIAL

## 2019-11-22 VITALS
WEIGHT: 140 LBS | DIASTOLIC BLOOD PRESSURE: 74 MMHG | SYSTOLIC BLOOD PRESSURE: 112 MMHG | BODY MASS INDEX: 21.97 KG/M2 | HEIGHT: 67 IN
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
## 2019-11-22 DIAGNOSIS — M79.601 RIGHT ARM PAIN: Primary | ICD-10-CM

## 2019-11-22 PROCEDURE — 99214 OFFICE O/P EST MOD 30 MIN: CPT | Performed by: PEDIATRICS

## 2019-11-22 ASSESSMENT — MIFFLIN-ST. JEOR: SCORE: 1287.67

## 2019-11-22 NOTE — PATIENT INSTRUCTIONS
Referral to hand/occupational therapy for the arm issues  Consider use of forearm strap, wrist brace for comfort with activities  Discussed modifying activities for soreness (e.g., changing how mouse is used)    For neck and shoulder pain, likely related to compensation; do home exercises from prior physical therapy    For the hip, knee pain:  Discussed trying to get more active, try yoga  If not improving with trying to add some exercise, additional considerations are further work up with imaging or lab studies, or possibly physical therapy

## 2019-11-22 NOTE — PROGRESS NOTES
Sports Medicine Clinic Visit    PCP: Melania Vega    An Cornejo is a 49 year old female who is seen  as a self referral presenting with multiple joint pain.  Pain in her hips and neck for years.  Currently the most painful is her right elbow.  Pain over the lateral aspect of her elbow and into her forearm.  Pain with full extension.  She feels her arm has become weak, and painful with range of motion.  Pain has been present for a few months to years, but feels that the pain has been increasing over the past month.  She feels like she has always had pain up into her shoulders and neck, which is worse when she has sinus congestion.  Pain with brushing her hair,  coffee, or bring a tissue to her face  No treatment of her elbow.    She is right hand dominant.     **  Muscular forearm pain which radiates to the upper arm and right shoulder. Pain worsens with stretching the dorsal forearm with extensions and flexions; is able to hyperextend her left arm. Positive for arm becoming ice cold occasionally while working. Denies numbness and tingling. Patient was seen at the clinic 1 year ago (Dr Liu) when she impacted her right lateral elbow.     **  Patient also reports bilateral lateral hip pain and knee pain. She initially was able to walk for 5 miles, but endurance has decreased due to pain. Reports her joints have been wearing over time.     Injury: on going     Location of Pain: right elbow and forearm   Duration of Pain: 1+ year(s)  Rating of Pain at worst: unable to give answer   Rating of Pain Currently: unable to give answer   Symptoms are better with: Nothing  Symptoms are worse with: use   Additional Features:   Positive: paresthesias and weakness   Negative: swelling, bruising, popping, grinding, catching, locking, instability, numbness, pain in other joints and systemic symptoms  Other evaluation and/or treatments so far consists of: currently nothing   Prior History of related problems:  "was seen last year following an elbow contusion     Social History: desk/computer job    Review of Systems  Musculoskeletal: as above  Remainder of review of systems is negative including constitutional, CV, pulmonary, GI, Skin and Neurologic except as noted in HPI or medical history.    Past Medical History:   Diagnosis Date     Anxiety     Use medication as needed     Asthma     Uses inhaler as needed     Cervical high risk HPV (human papillomavirus) test positive 12/18/2018    See problem list     Episodic tension type headache     Started when she was in her 30's intermittent     IUD     Mirena inserted 6/5/13     Neck pain     Both side of neck     Papanicolaou smear of cervix with low grade squamous intraepithelial lesion (LGSIL) 10/31/16, 12/18/18    See problem list     Seizures (H) infancy    \"grew out of it\"     Status post colposcopy 11/28/16    ECC - negative     Upper back pain     Over 5 years or more     Past Surgical History:   Procedure Laterality Date     BREAST LUMPECTOMY, RT/LT Left     Benign lesion as per patient     COLPORRHAPHY POSTERIOR N/A 10/21/2014    Procedure: COLPORRHAPHY POSTERIOR;  Surgeon: Fabian Harding MD;  [facility]     CYSTOSCOPY, SLING TRANSVAGINAL N/A 10/21/2014    Procedure: CYSTOSCOPY, SLING TRANSVAGINAL;  Surgeon: Fabian Harding MD;  [facility]     EXTRACTION(S) DENTAL       PERINEORRHAPHY N/A 10/21/2014    Procedure: PERINEORRHAPHY;  Surgeon: Fabian Harding MD;  [facility]     RECONSTRUCT BREAST BILATERAL       Family History   Problem Relation Age of Onset     Depression Mother      Hypertension Mother      Lipids Mother      Arthritis Mother      Asthma Mother      Pulmonary Embolism Mother      Breast Cancer Maternal Grandmother         postmenopausal     Cancer Maternal Grandmother      Respiratory Paternal Grandfather         emphysema     Hypertension Father      Cancer Sister         Thyroid     Neurologic Disorder Sister         MS "     Social History     Socioeconomic History     Marital status:      Spouse name: Not on file     Number of children: 2     Years of education: 16     Highest education level: Not on file   Occupational History     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 [employer]   Social Needs     Financial resource strain: Not on file     Food insecurity:     Worry: Not on file     Inability: Not on file     Transportation needs:     Medical: Not on file     Non-medical: Not on file   Tobacco Use     Smoking status: Never Smoker     Smokeless tobacco: Never Used     Tobacco comment: nonsmoking household   Substance and Sexual Activity     Alcohol use: Yes     Alcohol/week: 0.0 standard drinks     Comment: 2 glasses of gin and tonic may be once per month     Drug use: No     Sexual activity: Yes     Partners: Male   Lifestyle     Physical activity:     Days per week: Not on file     Minutes per session: Not on file     Stress: Not on file   Relationships     Social connections:     Talks on phone: Not on file     Gets together: Not on file     Attends [employer] service: Not on file     Active member of club or organization: Not on file     Attends meetings of clubs or organizations: Not on file     Relationship status: Not on file     Intimate partner violence:     Fear of current or ex partner: Not on file     Emotionally abused: Not on file     Physically abused: Not on file     Forced sexual activity: Not on file   Other Topics Concern     Parent/sibling w/ CABG, MI or angioplasty before 65F 55M? Not Asked   Social History Narrative     Not on file   This document serves as a record of the services and decisions personally performed and made by DO MOOKIE Bain. It was created on his behalf by Cornel Baca, a trained medical scribe. The creation of this document is based the provider's statements to the medical scribe.    Scribmanuelito Baca 10:03 AM 11/22/2019      Objective  /70   Ht 1.702 m (5'  "7\")   Wt 63.5 kg (140 lb)   BMI 21.93 kg/m      GENERAL APPEARANCE: healthy, alert and no distress   GAIT: NORMAL  SKIN: no suspicious lesions or rashes  NEURO: Normal strength and tone, mentation intact and speech normal  PSYCH:  mentation appears normal and affect normal/bright  HEENT: no scleral icterus  CV: distal perfusion intact  RESP: nonlabored breathing    Right Elbow exam:    Inspection:       no ecchymosis       no edema or effusion    ROM:       flexion grossly full       extension 5-10 degrees       forearm supination grossly full       forearm pronation grossly full    Tender:       Forearm       Lateral epicondyle       Extensor tendon lateral elbow    Non-Tender:       remainder of elbow area    Sensation:       intact throughout forearm, hand    Skin:       well perfused       capillary refill less than 2 seconds    Right Shoulder exam    ROM:            forward flexion grossly full with assistance       abduction grossly full       internal rotation low back; grossly full with assistance; pain on bilateral shoulders        external rotation grossly full with bilateral shoulder pain    Skin:      no visible deformities       well perfused       capillary refill brisk    Sensation:      normal sensation over shoulder and upper extremity     Cervical Spine Exam    Range of Motion:         Moderately limited flexion with stretch at mid upper trap       Extension grossly full with relieving pain       Lateral RIGHT flexion with pain and crunching on her LEFT       Lateral LEFT flexion with pain on the RIGHT and pinching on LEFT.        No pain with Shoulder shrug     Inspection:       No visible deformity        normal lordotic curvature maintained    Strength:       C8 (finger abduction, thumb flexion) symmetric 5/5; with pain       Resisted long feinger extension with pain       No pain with resisted wrist extension    Sensation:     grossly intact througout bilateral upper extremities    Skin:     " well perfused       capillary refill brisk    Lymphatics:      no edema noted in the upper extremities       Radiology:  Visualized prior radiographs of the right elbow.    Recent Results (from the past 24 hour(s))   XR Elbow RT G/E 3 vw    Narrative    XR ELBOW RT G/E 3 VW 11/2/2018 1:09 PM     HISTORY: ; Elbow injury, right, initial encounter      Impression    IMPRESSION: Negative exam.    YANELY COSTA MD         Assessment:  1. Right arm pain    2. Overuse injury        Plan:  Discussed the assessment with the patient.    Radiologic images reviewed and discussed with patient today  We discussed the following: symptom treatment and activity modification/rest. Following discussion, plan:  Ice, heat, Over the counter medications as needed     Elbow/Forearm:  Activity modification discussed; Ergonomics and utilizing the left arm.   Rehab: Discussed occupational/hand therapy vs physical therapy. Occupational therapy referral placed. Patient will continue with HEP from prior therapy  Support: Discussed use of wrist brace vs counter force brace; shown and reviewed. Counter force brace provided, may wear as needed    Neck/Shoulder Pain: Patient will begin HEP from prior physical therapy     Hip/Knee Pain: Discussed gradually increasing activity    Follow up: 4-6 weeks if not improving with above, sooner as needed   Future Considerations: Elbow MRI. X-rays of the bilateral hips. Lab studies for systemic issues, physical therapy   Questions answered. The patient indicates understanding of these issues and agrees with the plan.      Vijay Heard DO, CAQ        Patient Instructions   Referral to hand/occupational therapy for the arm issues  Consider use of forearm strap, wrist brace for comfort with activities  Discussed modifying activities for soreness (e.g., changing how mouse is used)    For neck and shoulder pain, likely related to compensation; do home exercises from prior physical therapy    For the hip, knee  pain:  Discussed trying to get more active, try yoga  If not improving with trying to add some exercise, additional considerations are further work up with imaging or lab studies, or possibly physical therapy        Disclaimer: This note consists of symbols derived from keyboarding, dictation and/or voice recognition software. As a result, there may be errors in the script that have gone undetected. Please consider this when interpreting information found in this chart.    The information in this document, created by a scribe for me, accurately reflects the services I personally performed and the decisions made by me. I have reviewed and approved this document for accuracy.

## 2019-11-22 NOTE — NURSING NOTE
Patient shown and fitted for elbow/forearm band and wrist brace.  She did feel improvement with arm band,  And this was dispensed.  Does have access to wrist brace at home, will try and discuss with OT.    Marian Payton MS ATC

## 2019-11-22 NOTE — LETTER
11/22/2019         RE: An Cornejo  [address] [facility] [address]        Dear Colleague,    Thank you for referring your patient, An Cornejo, to the [facility]. Please see a copy of my visit note below.    Sports Medicine Clinic Visit    PCP: Melania Vega    An Cornejo is a 49 year old female who is seen  as a self referral presenting with multiple joint pain.  Pain in her hips and neck for years.  Currently the most painful is her right elbow.  Pain over the lateral aspect of her elbow and into her forearm.  Pain with full extension.  She feels her arm has become weak, and painful with range of motion.  Pain has been present for a few months to years, but feels that the pain has been increasing over the past month.  She feels like she has always had pain up into her shoulders and neck, which is worse when she has sinus congestion.  Pain with brushing her hair,  coffee, or bring a tissue to her face  No treatment of her elbow.    She is right hand dominant.     **  Muscular forearm pain which radiates to the upper arm and right shoulder. Pain worsens with stretching the dorsal forearm with extensions and flexions; is able to hyperextend her left arm. Positive for arm becoming ice cold occasionally while working. Denies numbness and tingling. Patient was seen at the clinic 1 year ago (Dr Liu) when she impacted her right lateral elbow.     **  Patient also reports bilateral lateral hip pain and knee pain. She initially was able to walk for 5 miles, but endurance has decreased due to pain. Reports her joints have been wearing over time.     Injury: on going     Location of Pain: right elbow and forearm   Duration of Pain: 1+ year(s)  Rating of Pain at worst: unable to give answer   Rating of Pain Currently: unable to give answer   Symptoms are better with: Nothing  Symptoms are worse with: use   Additional Features:   Positive: paresthesias and  "weakness   Negative: swelling, bruising, popping, grinding, catching, locking, instability, numbness, pain in other joints and systemic symptoms  Other evaluation and/or treatments so far consists of: currently nothing   Prior History of related problems: was seen last year following an elbow contusion     Social History: desk/computer job    Review of Systems  Musculoskeletal: as above  Remainder of review of systems is negative including constitutional, CV, pulmonary, GI, Skin and Neurologic except as noted in HPI or medical history.    Past Medical History:   Diagnosis Date     Anxiety     Use medication as needed     Asthma     Uses inhaler as needed     Cervical high risk HPV (human papillomavirus) test positive 12/18/2018    See problem list     Episodic tension type headache     Started when she was in her 30's intermittent     IUD     Mirena inserted 6/5/13     Neck pain     Both side of neck     Papanicolaou smear of cervix with low grade squamous intraepithelial lesion (LGSIL) 10/31/16, 12/18/18    See problem list     Seizures (H) infancy    \"grew out of it\"     Status post colposcopy 11/28/16    ECC - negative     Upper back pain     Over 5 years or more     Past Surgical History:   Procedure Laterality Date     BREAST LUMPECTOMY, RT/LT Left     Benign lesion as per patient     COLPORRHAPHY POSTERIOR N/A 10/21/2014    Procedure: COLPORRHAPHY POSTERIOR;  Surgeon: Fabian Harding MD;  Location: [facility]     CYSTOSCOPY, SLING TRANSVAGINAL N/A 10/21/2014    Procedure: CYSTOSCOPY, SLING TRANSVAGINAL;  Surgeon: Fabian Harding MD;  Location: [facility]     EXTRACTION(S) DENTAL       PERINEORRHAPHY N/A 10/21/2014    Procedure: PERINEORRHAPHY;  Surgeon: Fabian Harding MD;  Location: [facility] OR     RECONSTRUCT BREAST BILATERAL       Family History   Problem Relation Age of Onset     Depression Mother      Hypertension Mother      Lipids Mother      Arthritis Mother      Asthma Mother      Pulmonary Embolism Mother  "     Breast Cancer Maternal Grandmother         postmenopausal     Cancer Maternal Grandmother      Respiratory Paternal Grandfather         emphysema     Hypertension Father      Cancer Sister         Thyroid     Neurologic Disorder Sister         MS     Social History     Socioeconomic History     Marital status:      Spouse name: Not on file     Number of children: 2     Years of education: 16     Highest education level: Not on file   Occupational History     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 [employer]   Social Needs     Financial resource strain: Not on file     Food insecurity:     Worry: Not on file     Inability: Not on file     Transportation needs:     Medical: Not on file     Non-medical: Not on file   Tobacco Use     Smoking status: Never Smoker     Smokeless tobacco: Never Used     Tobacco comment: nonsmoking household   Substance and Sexual Activity     Alcohol use: Yes     Alcohol/week: 0.0 standard drinks     Comment: 2 glasses of gin and tonic may be once per month     Drug use: No     Sexual activity: Yes     Partners: Male   Lifestyle     Physical activity:     Days per week: Not on file     Minutes per session: Not on file     Stress: Not on file   Relationships     Social connections:     Talks on phone: Not on file     Gets together: Not on file     Attends Jainism service: Not on file     Active member of club or organization: Not on file     Attends meetings of clubs or organizations: Not on file     Relationship status: Not on file     Intimate partner violence:     Fear of current or ex partner: Not on file     Emotionally abused: Not on file     Physically abused: Not on file     Forced sexual activity: Not on file   Other Topics Concern     Parent/sibling w/ CABG, MI or angioplasty before 65F 55M? Not Asked   Social History Narrative     Not on file   This document serves as a record of the services and decisions personally performed and made by Vijay Heard DO  "CAQ. It was created on his behalf by Cornel Baca, a trained medical scribe. The creation of this document is based the provider's statements to the medical scribe.    Deanne Baca 10:03 AM 11/22/2019      Objective  /70   Ht 1.702 m (5' 7\")   Wt 63.5 kg (140 lb)   BMI 21.93 kg/m       GENERAL APPEARANCE: healthy, alert and no distress   GAIT: NORMAL  SKIN: no suspicious lesions or rashes  NEURO: Normal strength and tone, mentation intact and speech normal  PSYCH:  mentation appears normal and affect normal/bright  HEENT: no scleral icterus  CV: distal perfusion intact  RESP: nonlabored breathing    Right Elbow exam:    Inspection:       no ecchymosis       no edema or effusion    ROM:       flexion grossly full       extension 5-10 degrees       forearm supination grossly full       forearm pronation grossly full    Tender:       Forearm       Lateral epicondyle       Extensor tendon lateral elbow    Non-Tender:       remainder of elbow area    Sensation:       intact throughout forearm, hand    Skin:       well perfused       capillary refill less than 2 seconds    Right Shoulder exam    ROM:            forward flexion grossly full with assistance       abduction grossly full       internal rotation low back; grossly full with assistance; pain on bilateral shoulders        external rotation grossly full with bilateral shoulder pain    Skin:      no visible deformities       well perfused       capillary refill brisk    Sensation:      normal sensation over shoulder and upper extremity     Cervical Spine Exam    Range of Motion:         Moderately limited flexion with stretch at mid upper trap       Extension grossly full with relieving pain       Lateral RIGHT flexion with pain and crunching on her LEFT       Lateral LEFT flexion with pain on the RIGHT and pinching on LEFT.        No pain with Shoulder shrug     Inspection:       No visible deformity        normal lordotic curvature " maintained    Strength:       C8 (finger abduction, thumb flexion) symmetric 5/5; with pain       Resisted long feinger extension with pain       No pain with resisted wrist extension    Sensation:     grossly intact througout bilateral upper extremities    Skin:     well perfused       capillary refill brisk    Lymphatics:      no edema noted in the upper extremities       Radiology:  Visualized prior radiographs of the right elbow.    Recent Results (from the past 24 hour(s))   XR Elbow RT G/E 3 vw    Narrative    XR ELBOW RT G/E 3 VW 11/2/2018 1:09 PM     HISTORY: ; Elbow injury, right, initial encounter      Impression    IMPRESSION: Negative exam.    YANELY COSTA MD         Assessment:  1. Right arm pain    2. Overuse injury        Plan:  Discussed the assessment with the patient.    Radiologic images reviewed and discussed with patient today  We discussed the following: symptom treatment and activity modification/rest. Following discussion, plan:  Ice, heat, Over the counter medications as needed     Elbow/Forearm:  Activity modification discussed; Ergonomics and utilizing the left arm.   Rehab: Discussed occupational/hand therapy vs physical therapy. Occupational therapy referral placed. Patient will continue with HEP from prior therapy  Support: Discussed use of wrist brace vs counter force brace; shown and reviewed. Counter force brace provided, may wear as needed    Neck/Shoulder Pain: Patient will begin HEP from prior physical therapy     Hip/Knee Pain: Discussed gradually increasing activity    Follow up: 4-6 weeks if not improving with above, sooner as needed   Future Considerations: Elbow MRI. X-rays of the bilateral hips. Lab studies for systemic issues, physical therapy   Questions answered. The patient indicates understanding of these issues and agrees with the plan.      Vijay Heard DO, CAQ        Patient Instructions   Referral to hand/occupational therapy for the arm issues  Consider use  of forearm strap, wrist brace for comfort with activities  Discussed modifying activities for soreness (e.g., changing how mouse is used)    For neck and shoulder pain, likely related to compensation; do home exercises from prior physical therapy    For the hip, knee pain:  Discussed trying to get more active, try yoga  If not improving with trying to add some exercise, additional considerations are further work up with imaging or lab studies, or possibly physical therapy        Disclaimer: This note consists of symbols derived from keyboarding, dictation and/or voice recognition software. As a result, there may be errors in the script that have gone undetected. Please consider this when interpreting information found in this chart.    The information in this document, created by a scribe for me, accurately reflects the services I personally performed and the decisions made by me. I have reviewed and approved this document for accuracy.       Again, thank you for allowing me to participate in the care of your patient.        Sincerely,        Vijay Heard, DO

## 2019-12-05 ENCOUNTER — THERAPY VISIT (OUTPATIENT)
Dept: OCCUPATIONAL THERAPY | Facility: CLINIC | Age: 49
End: 2019-12-05
Attending: PEDIATRICS
Payer: COMMERCIAL
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
## 2019-12-05 DIAGNOSIS — G56.31 LESION OF RADIAL NERVE, RIGHT: ICD-10-CM

## 2019-12-05 DIAGNOSIS — M79.601 RIGHT ARM PAIN: Primary | ICD-10-CM

## 2019-12-05 PROCEDURE — 97112 NEUROMUSCULAR REEDUCATION: CPT | Mod: GO | Performed by: OCCUPATIONAL THERAPIST

## 2019-12-05 PROCEDURE — 97165 OT EVAL LOW COMPLEX 30 MIN: CPT | Mod: GO | Performed by: OCCUPATIONAL THERAPIST

## 2019-12-05 PROCEDURE — 97110 THERAPEUTIC EXERCISES: CPT | Mod: GO | Performed by: OCCUPATIONAL THERAPIST

## 2019-12-05 PROCEDURE — 97140 MANUAL THERAPY 1/> REGIONS: CPT | Mod: GO | Performed by: OCCUPATIONAL THERAPIST

## 2019-12-05 NOTE — PROGRESS NOTES
Hand Therapy Initial Evaluation    Current Date:  12/5/2019    Subjective:  An Cornejo is a 49 year old right hand dominant female.    Diagnosis:   Right forearm pain  DOI:  11/22/19 (therapy referral)    Patient reports symptoms of pain, stiffness/loss of motion, weakness/loss of strength and tingling of the right forearm which occurred after digging out landscape blocks in September, has had arm pain for the past 5 years into shoulder and neck. Since onset symptoms are gradually getting worse.  Special tests:  none.  Previous treatment: previous PT for shoulder and neck, self treatment with stretching and massage.  General health as reported by patient is good.  Pertinent medical history includes:Asthma, Dizziness, Menopausal, Migraines, Seizures.  Medical allergies:see EMR.  Surgical history: other: breast reconstruction.  Medication history: None.    Occupational Profile Information:  Current occupation is Writer/   Currently working in normal job without restrictions  Job Tasks: Computer Work, Prolonged Sitting, Repetitive Tasks  Prior functional level:  independent-all household chores  Barriers include:none  Mobility: No difficulty  Transportation: drives    Functional Outcome Measure:  Upper Extremity Functional Index  SCORE:   Column Totals: 40/80  (A lower score indicates greater disability.)    Objective:  Pain Level (Scale 0-10)   12/5/2019   At Rest 0-4   With Use 7     Pain Description  Date 12/5/2019   Location Forearm/extensors   Pain Quality Tender   Frequency intermittent     Pain is worst  daytime and nighttime   Exacerbated by  Reaching, gripping, lifting, typing, mousing, writing    Relieved by none   Progression Gradually worsening     Posture  Forward Neck Posture, Rounded Forward Shoulders and Shoulder Height Difference. Patient's proximal musculature at shoulder appears imbalanced with tightness likely contributing to overuse of distal musculature.    Sensation  Decreased  intermittently in Radial Nerve distribution per pt report    ROM  WNL's elbow, forearm and wrist, pain with elbow ext > flexion and wrist ext > flexion    Strength   (Measured in pounds)  Pain Report: - none  + mild    ++ moderate    +++ severe    12/5/2019 12/5/2019   Trials Left Right   1  2  3 68  65  62 25+  24+  28+   Average 65 26       12/5/2019 12/5/2019    Left Right   Elbow Ext 72 5++     Resisted Testing  Pain Report:  - none    + mild    ++ moderate    +++ severe    12/5/2019   Elbow Extension -   Elbow Flexion -   Supination  + slight   Pronation +   Wrist Ext with RD, Elbow at side +++   Wrist Ext with UD, Elbow at side ++   Wrist Flex with RD, Elbow at side -   Wrist Flex with UD, Elbow at side -   EDC with Elbow at side +   Long Finger Test +     Special Tests   12/5/2019   ULTT Radial Nerve ++ sh abd     Palpation  Pain Report:  - none    + mild    ++ moderate    +++ severe    12/5/2019   Pec Major + tightness   Proximal Triceps -   Spiral Groove -   Distal Triceps -   Anconeus -   ECRB at LEP -   ECU at LEP -   EDC at LEP +   Radial Head -   Extensor Wad + tightness   PIN Site ++   MEP -   Flexors -     Assessment:  Patient presents with symptoms consistent with diagnosis of right forearm pain and radial nerve irritation, with conservative intervention.     Patient's limitations or Problem List includes:  Pain, Weakness, Sensory disturbance, Decreased  and Tightness in musculature of the right foream which interferes with the patient's ability to perform Self Care Tasks (dressing, eating, bathing), Work Tasks, Sleep Patterns, Recreational Activities, Household Chores and Driving  as compared to previous level of function.    Rehab Potential:  Good - Return to full activity, some limitations    Patient will benefit from skilled Occupational Therapy to increase flexibility, overall strength,  strength and sensation and decrease pain to return to previous activity level and resume  normal daily tasks and to reach their rehab potential.    Barriers to Learning:  No barrier    Communication Issues:  Patient appears to be able to clearly communicate and understand verbal and written communication and follow directions correctly.    Chart Review: Chart Review and Simple history review with patient    Identified Performance Deficits: bathing/showering, dressing, hygiene and grooming, driving and community mobility, home establishment and management, sleep, work and leisure activities    Assessment of Occupational Performance:  5 or more Performance Deficits    Clinical Decision Making (Complexity): Low complexity    Treatment Explanation:  The following has been discussed with the patient:  RX ordered/plan of care  Anticipated outcomes  Possible risks and side effects    Plan:  Frequency:  1 X week, once daily  Duration:  for 6 weeks    Treatment Plan:    Modalities:    US   Therapeutic Exercise:    AROM, PROM and Isotonics  Neuromuscular re-ed:   Nerve Gliding, Posture and Kinesiotaping  Manual Techniques:   Friction massage and Myofascial release  Orthotic Fabrication:    Forearm based orthosis  Self Care:    Self Care Tasks, Ergonomic Considerations and Diagnostic Education     Discharge Plan:  Achieve all LTG.  Independent in home treatment program.  Reach maximal therapeutic benefit.    Home Program:   Warmth for stiffness to forearm extensors  Ice to lateral elbow after activity for pain  TFM to LEP  MFR to forearm extensors with tennis ball, avoid PIN site  PROM with stretch to forearm extensors and flexors, begin with elbow at side, avoid hyper-ext of elbow  Partial proximal radial nerve gliding, avoid hyper-ext of elbow  Postural awareness, avoid rounded shoulders and forward head  Postural exercises with chin tucks and scapular retraction   Trial Kinesiotaping to LEP and PIN site  Avoid use of arm band  Wear OTC wrist splint sleeping  Avoid activities that exacerbate pain in the  elbow  Lift with elbows at sides and palms up  Avoid reaching for keyboard and mouse  Use arm not wrist motion when typing/mousing    Next Visit:  See in 1 week  Assess response to HEP and kinesiotaping   Increase stretching and nerve gliding as tolerated  Add pec stretches  Progress to eccentric wrist extension strengthening and 3 position  strengthening as able

## 2019-12-18 ENCOUNTER — THERAPY VISIT (OUTPATIENT)
Dept: OCCUPATIONAL THERAPY | Facility: CLINIC | Age: 49
End: 2019-12-18
Payer: COMMERCIAL

## 2019-12-18 DIAGNOSIS — M79.601 RIGHT ARM PAIN: ICD-10-CM
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
## 2019-12-18 DIAGNOSIS — G56.31 LESION OF RADIAL NERVE, RIGHT: ICD-10-CM

## 2019-12-18 PROCEDURE — 97110 THERAPEUTIC EXERCISES: CPT | Mod: GO | Performed by: OCCUPATIONAL THERAPIST

## 2019-12-18 PROCEDURE — 97140 MANUAL THERAPY 1/> REGIONS: CPT | Mod: GO | Performed by: OCCUPATIONAL THERAPIST

## 2019-12-18 PROCEDURE — 97112 NEUROMUSCULAR REEDUCATION: CPT | Mod: GO | Performed by: OCCUPATIONAL THERAPIST

## 2019-12-18 NOTE — PROGRESS NOTES
SOAP note objective information for 12/18/2019:    Pain Level (Scale 0-10)   12/5/2019 12/18/2019   At Rest 0-4 0   With Use 7 3-4     Palpation  Pain Report:  - none    + mild    ++ moderate    +++ severe    12/5/2019 12/18/2019   Pec Major + tightness    Proximal Triceps -    Spiral Groove -    Distal Triceps -    Anconeus -    ECRB at LEP -    ECU at LEP -    EDC at LEP + + slight   Radial Head -    Extensor Wad + tightness -   PIN Site ++ +   MEP -    Flexors -      Home Program:   Warmth for stiffness to forearm extensors  Ice to lateral elbow after activity for pain  TFM to LEP  MFR to forearm extensors with tennis ball, avoid PIN site  PROM with stretch to forearm extensors and flexors, extend elbow as aaliyah, avoid hyper-ext of elbow  Partial proximal radial nerve gliding, increase as aaliyah, avoid hyper-ext of elbow  Postural awareness, avoid rounded shoulders and forward head  Postural exercises with chin tucks and scapular retraction   Pec stretch in doorway or corner  Kinesiotaping to LEP and PIN site  Avoid use of arm band  Wear OTC wrist splint sleeping  Avoid activities that exacerbate pain in the elbow  Lift with elbows at sides and palms up  Avoid reaching for keyboard and mouse  Use arm not wrist motion when typing/mousing    Next Visit:  See in 1 week  Assess response to ncreased stretching and nerve gliding and pec stretches  Progress to eccentric wrist extension strengthening and 3 position  strengthening     Please refer to the daily flowsheet for treatment today, total treatment time and time spent performing 1:1 timed codes.

## 2019-12-26 ENCOUNTER — THERAPY VISIT (OUTPATIENT)
Dept: OCCUPATIONAL THERAPY | Facility: CLINIC | Age: 49
End: 2019-12-26
Payer: COMMERCIAL

## 2019-12-26 DIAGNOSIS — M79.601 RIGHT ARM PAIN: ICD-10-CM

## 2019-12-26 DIAGNOSIS — G56.31 LESION OF RADIAL NERVE, RIGHT: ICD-10-CM
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
## 2019-12-26 PROCEDURE — 97140 MANUAL THERAPY 1/> REGIONS: CPT | Mod: GO | Performed by: OCCUPATIONAL THERAPIST

## 2019-12-26 PROCEDURE — 97110 THERAPEUTIC EXERCISES: CPT | Mod: GO | Performed by: OCCUPATIONAL THERAPIST

## 2019-12-26 PROCEDURE — 97112 NEUROMUSCULAR REEDUCATION: CPT | Mod: GO | Performed by: OCCUPATIONAL THERAPIST

## 2019-12-26 NOTE — PROGRESS NOTES
SOAP note objective information for 12/26/2019:    Pain Level (Scale 0-10)   12/5/2019 12/18/2019 12/26/2019   At Rest 0-4 0    With Use 7 3-4 4     Palpation  Pain Report:  - none    + mild    ++ moderate    +++ severe    12/5/2019 12/18/2019 12/26/2019   Anconeus - - -   ECRB at LEP - - + slight   ECU at LEP - - -   EDC at LEP + + slight +   Radial Head - - -   Extensor Wad + tightness - -   PIN Site ++ + +     Home Program:   Warmth for stiffness to forearm extensors  Ice to lateral elbow after activity for pain  TFM to LEP  MFR to forearm extensors with tennis ball, avoid PIN site  PROM with stretch to forearm extensors and flexors, extend elbow and advanced extensor stretch as aaliyah, avoid hyper-ext of elbow  Partial proximal radial nerve gliding, increase as aaliyah, avoid hyper-ext of elbow  Postural awareness, avoid rounded shoulders and forward head  Postural exercises with chin tucks and scapular retraction   Pec stretch in doorway or corner  Eccentric wrist extension strengthening   Kinesiotaping to LEP and PIN site  Avoid use of arm band  Wear OTC wrist splint sleeping  Avoid activities that exacerbate pain in the elbow  Lift with elbows at sides and palms up  Avoid reaching for keyboard and mouse  Use arm not wrist motion when typing/mousing    Next Visit:  See in 1 week  Assess response to advanced extensor stretch and eccentric wrist extension strengthening  Progress to 3 position  strengthening     Please refer to the daily flowsheet for treatment today, total treatment time and time spent performing 1:1 timed codes.

## 2020-01-02 ENCOUNTER — THERAPY VISIT (OUTPATIENT)
Dept: OCCUPATIONAL THERAPY | Facility: CLINIC | Age: 50
End: 2020-01-02
Payer: COMMERCIAL

## 2020-01-02 DIAGNOSIS — G56.31 LESION OF RADIAL NERVE, RIGHT: ICD-10-CM

## 2020-01-02 DIAGNOSIS — M79.601 RIGHT ARM PAIN: ICD-10-CM
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
## 2020-01-02 PROCEDURE — 97112 NEUROMUSCULAR REEDUCATION: CPT | Mod: GO | Performed by: OCCUPATIONAL THERAPIST

## 2020-01-02 PROCEDURE — 97110 THERAPEUTIC EXERCISES: CPT | Mod: GO | Performed by: OCCUPATIONAL THERAPIST

## 2020-01-02 PROCEDURE — 97140 MANUAL THERAPY 1/> REGIONS: CPT | Mod: GO | Performed by: OCCUPATIONAL THERAPIST

## 2020-01-02 NOTE — PROGRESS NOTES
SOAP note objective information for 1/2/2020:    Pain Level (Scale 0-10)   12/5/2019 12/18/2019 12/26/2019 1/2/2020   At Rest 0-4 0     With Use 7 3-4 4 4     Palpation  Pain Report:  - none    + mild    ++ moderate    +++ severe    12/5/2019 12/18/2019 12/26/2019 1/2/2020   Anconeus - - - -   ECRB at LEP - - + slight -   ECU at LEP - - - -   EDC at LEP + + slight + +   Radial Head - - - -   Extensor Wad + tightness - - -   PIN Site ++ + + + slight     Home Program:   Warmth for stiffness to forearm extensors  Ice to lateral elbow after activity for pain  TFM to LEP  MFR to forearm extensors with tennis ball, avoid PIN site  PROM with stretch to forearm extensors and flexors, extend elbow and advanced extensor stretch as aaliyah, avoid hyper-ext of elbow  Partial proximal radial nerve gliding, increase as aaliyah, avoid hyper-ext of elbow  Postural awareness, avoid rounded shoulders and forward head  Postural exercises with chin tucks and scapular retraction   Pec stretch in doorway or corner  Eccentric wrist extension strengthening   Kinesiotaping to LEP and PIN site  Avoid use of arm band  Wear OTC wrist splint sleeping  Avoid activities that exacerbate pain in the elbow  Lift with elbows at sides and palms up  Avoid reaching for keyboard and mouse  Use arm not wrist motion when typing/mousing    Next Visit:  See in 1 week  Progress to 3 position  strengthening   Wean to HEP as pain improves    Please refer to the daily flowsheet for treatment today, total treatment time and time spent performing 1:1 timed codes.

## 2020-01-08 ENCOUNTER — THERAPY VISIT (OUTPATIENT)
Dept: OCCUPATIONAL THERAPY | Facility: CLINIC | Age: 50
End: 2020-01-08
Payer: COMMERCIAL

## 2020-01-08 DIAGNOSIS — G56.31 LESION OF RADIAL NERVE, RIGHT: ICD-10-CM

## 2020-01-08 DIAGNOSIS — M79.601 RIGHT ARM PAIN: ICD-10-CM
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
## 2020-01-08 PROCEDURE — 97110 THERAPEUTIC EXERCISES: CPT | Mod: GO | Performed by: OCCUPATIONAL THERAPIST

## 2020-01-08 PROCEDURE — 97112 NEUROMUSCULAR REEDUCATION: CPT | Mod: GO | Performed by: OCCUPATIONAL THERAPIST

## 2020-01-08 PROCEDURE — 97140 MANUAL THERAPY 1/> REGIONS: CPT | Mod: GO | Performed by: OCCUPATIONAL THERAPIST

## 2020-01-08 NOTE — PROGRESS NOTES
SOAP note objective information for 1/8/2020:    Pain Level (Scale 0-10)   12/5/2019 12/18/2019 12/26/2019 1/2/2020 1/8/2020   At Rest 0-4 0      With Use 7 3-4 4 4 0-2     Palpation  Pain Report:  - none    + mild    ++ moderate    +++ severe    12/5/2019 12/18/2019 12/26/2019 1/2/2020 1/8/2020   Anconeus - - - - -   ECRB at LEP - - + slight - -   ECU at LEP - - - - -   EDC at LEP + + slight + + -   Radial Head - - - - -   Extensor Wad + tightness - - - -   PIN Site ++ + + + slight + slight     Home Program:   Warmth for stiffness to forearm extensors  Ice to lateral elbow after activity for pain  TFM to LEP  MFR to forearm extensors with tennis ball, avoid PIN site  PROM with stretch to forearm extensors and flexors, extend elbow and advanced extensor stretch as aaliyah, avoid hyper-ext of elbow  Partial proximal radial nerve gliding, increase as aaliyah, avoid hyper-ext of elbow  Postural awareness, avoid rounded shoulders and forward head  Postural exercises with chin tucks and scapular retraction   Pec stretch in doorway or corner  Eccentric wrist extension strengthening   3 position  strengthening, begin with elbow at side  Kinesiotaping to LEP and PIN site  Avoid use of arm band  Wear OTC wrist splint sleeping  Avoid activities that exacerbate pain in the elbow  Lift with elbows at sides and palms up  Avoid reaching for keyboard and mouse  Use arm not wrist motion when typing/mousing    Next Visit:  See in 2 weeks  Assess response to 3 position  strengthening   Progress Report and UEFI   Anticipate discharge  to HEP    Please refer to the daily flowsheet for treatment today, total treatment time and time spent performing 1:1 timed codes.

## 2020-01-22 ENCOUNTER — THERAPY VISIT (OUTPATIENT)
Dept: OCCUPATIONAL THERAPY | Facility: CLINIC | Age: 50
End: 2020-01-22
Payer: COMMERCIAL

## 2020-01-22 DIAGNOSIS — G56.31 LESION OF RADIAL NERVE, RIGHT: ICD-10-CM

## 2020-01-22 DIAGNOSIS — M79.601 RIGHT ARM PAIN: ICD-10-CM
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
## 2020-01-22 PROCEDURE — 97110 THERAPEUTIC EXERCISES: CPT | Mod: GO | Performed by: OCCUPATIONAL THERAPIST

## 2020-01-22 PROCEDURE — 97112 NEUROMUSCULAR REEDUCATION: CPT | Mod: GO | Performed by: OCCUPATIONAL THERAPIST

## 2020-01-22 PROCEDURE — 97140 MANUAL THERAPY 1/> REGIONS: CPT | Mod: GO | Performed by: OCCUPATIONAL THERAPIST

## 2020-01-22 NOTE — PROGRESS NOTES
Hand Therapy Progress Note    Current Date:  1/22/2020    Reporting period is 12/5/2019 to 1/22/2020    Diagnosis:   Right forearm pain  DOI:  11/22/19 (therapy referral)    Subjective:   Subjective changes noted by patient:  The tape really helps but it has been off about a week so it seem sore again.  Functional changes noted by patient:  Improvement in Household Chores  Patient has noted adverse reaction to:  None    Objective:  Pain Level (Scale 0-10)   12/5/2019 1/22/2020   At Rest 0-4 0   With Use 7 4-5     Pain Description  Date 12/5/2019 1/22/2020   Location Forearm/extensors Forearm   Pain Quality Tender Cramping, tightness, weakness   Frequency intermittent   Intermittent   Pain is worst  daytime and nighttime daytime   Exacerbated by  Reaching, gripping, lifting, typing, mousing, writing  Gripping, lifting, reaching   Relieved by none massaging   Progression Gradually worsening Slowly improving     Posture  Slightly Forward Neck Posture and Rounded Forward Shoulders     Sensation  No longer decreased intermittently in Radial Nerve distribution per pt report    ROM  WNL's elbow, forearm and wrist, pain with elbow ext > flexion and wrist ext > flexion    Strength   (Measured in pounds)  Pain Report: - none  + mild    ++ moderate    +++ severe    12/5/2019 12/5/2019 1/22/2020   Trials Left Right Right   1  2  3 68  65  62 25+  24+  28+ 17+  17+  12+   Average 65 26 15       12/5/2019 12/5/2019 1/22/2020    Left Right Right   Elbow Ext 72 5++ NT     Resisted Testing  Pain Report:  - none    + mild    ++ moderate    +++ severe    12/5/2019 1/22/2020   Elbow Extension - -   Elbow Flexion - -   Supination  + slight +   Pronation + -   Wrist Ext with RD, Elbow at side +++ +   Wrist Ext with UD, Elbow at side ++ -   Wrist Flex with RD, Elbow at side - -   Wrist Flex with UD, Elbow at side - -   EDC with Elbow at side + + silght   Long Finger Test + + slight     Special Tests   12/5/2019 1/22/2020   RENO  Radial Nerve ++ sh abd -     Palpation  Pain Report:  - none    + mild    ++ moderate    +++ severe    12/5/2019 1/22/2020   Pec Major + tightness + slight   Proximal Triceps - -   Spiral Groove - -   Distal Triceps - -   Anconeus - -   ECRB at LEP - -   ECU at LEP - -   EDC at LEP + -   Radial Head - -   Extensor Wad + tightness -   PIN Site ++ +   MEP - -   Flexors - -     Please refer to the daily flowsheet for treatment provided today.     Assessment:  Response to therapy has been improvement to:  Flexibility:  less tightness in involved muscles  Pain:  intensity of pain is decreased and less tender over affected area  Response to therapy has been lack of progress in:  Strength:       Overall Assessment:  Patient is progressing well and would benefit from continued therapy to achieve rehab potential  STG/LTG:  STGoals have been reviewed and progress or achievement has occurred;  see goal sheet for details and updates.  I have re-evaluated this patient and find that the nature, scope, duration and intensity of the therapy is appropriate for the medical condition of the patient.    Plan:  Frequency/Duration:  Recommend continuing with the current treatment plan. 2 X a month, once daily  for 1 month    Recommendations for Continued Therapy  Treatment Plan:    Modalities:    US   Therapeutic Exercise:    AROM, PROM and Isotonics  Neuromuscular re-ed:   Nerve Gliding, Posture and Kinesiotaping  Manual Techniques:   Friction massage and Myofascial release  Orthotic Fabrication:    Forearm based orthosis  Self Care:    Self Care Tasks, Ergonomic Considerations and Diagnostic Education     Home Program:   Warmth for stiffness to forearm extensors  Ice to lateral elbow after activity for pain  TFM to LEP  MFR to forearm extensors with tennis ball, avoid PIN site  PROM with stretch to forearm extensors and flexors, extend elbow and advanced extensor stretch as aaliyah, avoid hyper-ext of elbow  Partial proximal radial  nerve gliding, increase as aaliyah, avoid hyper-ext of elbow  Postural awareness, avoid rounded shoulders and forward head  Postural exercises with chin tucks and scapular retraction   Pec stretch in doorway or corner  Eccentric wrist extension strengthening   3 position  strengthening, begin with elbow at side  Kinesiotaping to PIN site  Avoid use of arm band  Wear OTC wrist splint sleeping  Avoid activities that exacerbate pain in the elbow  Lift with elbows at sides and palms up  Avoid reaching for keyboard and mouse  Use arm not wrist motion when typing/mousing    Next Visit:  See in 2 weeks

## 2020-02-05 ENCOUNTER — THERAPY VISIT (OUTPATIENT)
Dept: OCCUPATIONAL THERAPY | Facility: CLINIC | Age: 50
End: 2020-02-05
Payer: COMMERCIAL

## 2020-02-05 DIAGNOSIS — G56.31 LESION OF RADIAL NERVE, RIGHT: ICD-10-CM
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
## 2020-02-05 DIAGNOSIS — M79.601 RIGHT ARM PAIN: ICD-10-CM

## 2020-02-05 PROCEDURE — 97110 THERAPEUTIC EXERCISES: CPT | Mod: GO | Performed by: OCCUPATIONAL THERAPIST

## 2020-02-05 PROCEDURE — 97140 MANUAL THERAPY 1/> REGIONS: CPT | Mod: GO | Performed by: OCCUPATIONAL THERAPIST

## 2020-02-05 NOTE — PROGRESS NOTES
SOAP note objective information for 2/5/2020:    Pain Level (Scale 0-10)   12/5/2019 1/22/2020 2/5/2020   At Rest 0-4 0    With Use 7 4-5 2     Strength   (Measured in pounds)  Pain Report: - none  + mild    ++ moderate    +++ severe    12/5/2019 12/5/2019 1/22/2020 2/5/2020   Trials Left Right Right Right   1  2  3 68  65  62 25+  24+  28+ 17+  17+  12+ 44-  43+  50+   Average 65 26 15 46     Palpation  Pain Report:  - none    + mild    ++ moderate    +++ severe    12/5/2019 1/22/2020 2/5/2020   Pec Major + tightness + slight -   Proximal Triceps - - -   Spiral Groove - - -   Distal Triceps - - -   Anconeus - - -   ECRB at LEP - - -   ECU at LEP - - -   EDC at LEP + - -   Radial Head - - -   Extensor Wad + tightness - -   PIN Site ++ + + slight   MEP - - -   Flexors - - -     Home Program:   Warmth for stiffness to forearm extensors  Ice to lateral elbow after activity for pain  TFM to LEP  MFR to forearm extensors with tennis ball, avoid PIN site; consider foam roller for neck and upper back trigger points   PROM with stretch to forearm extensors and flexors, extend elbow and advanced extensor stretch as aaliyah, avoid hyper-ext of elbow  Partial proximal radial nerve gliding, increase as aaliyah, avoid hyper-ext of elbow  Postural awareness, avoid rounded shoulders and forward head  Postural exercises with chin tucks and scapular retraction   Pec stretch in doorway or corner  Eccentric wrist extension strengthening   3 position  strengthening, progress to elbow extended  Kinesiotaping to PIN site as needed  Avoid use of arm band  Wear OTC wrist splint sleeping  Avoid activities that exacerbate pain in the elbow  Lift with elbows at sides and palms up  Avoid reaching for keyboard and mouse  Use arm not wrist motion when typing/mousing    Next Visit:  See 1 more visit in 2-4 weeks  Progress Report and UEFI  Anticipate discharge to HEP     Please refer to the daily flowsheet for treatment today, total treatment time  and time spent performing 1:1 timed codes.

## 2020-02-26 ENCOUNTER — THERAPY VISIT (OUTPATIENT)
Dept: OCCUPATIONAL THERAPY | Facility: CLINIC | Age: 50
End: 2020-02-26
Payer: COMMERCIAL

## 2020-02-26 DIAGNOSIS — M79.601 RIGHT ARM PAIN: ICD-10-CM
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
## 2020-02-26 DIAGNOSIS — G56.31 LESION OF RADIAL NERVE, RIGHT: ICD-10-CM

## 2020-02-26 PROCEDURE — 97535 SELF CARE MNGMENT TRAINING: CPT | Mod: GO | Performed by: OCCUPATIONAL THERAPIST

## 2020-02-26 PROCEDURE — 97110 THERAPEUTIC EXERCISES: CPT | Mod: GO | Performed by: OCCUPATIONAL THERAPIST

## 2020-02-26 NOTE — PROGRESS NOTES
Hand Therapy Progress/Discharge Note    Current Date:  2/26/2020    Reporting period is 1/22/2020 to 2/26/2020    Diagnosis:   Right forearm pain  DOI:  11/22/19 (therapy referral)    Subjective:   Subjective changes noted by patient:  The elbow is doing so much better!  Functional changes noted by patient:  Improvement in Household Chores  Patient has noted adverse reaction to:  None    Functional Outcome Measure:  Upper Extremity Functional Index  SCORE:   Column Totals: 75/80  (A lower score indicates greater disability.)    Objective:  Pain Level (Scale 0-10)   12/5/2019 1/22/2020 2/26/2020   At Rest 0-4 0    With Use 7 4-5 1-2     Pain Description  Date 12/5/2019 1/22/2020 2/26/2020   Location Forearm/extensors Forearm Elbow and forearm   Pain Quality Tender Cramping, tightness, weakness stiffness   Frequency intermittent   Intermittent Intermittent   Pain is worst  daytime and nighttime daytime Day, mornings   Exacerbated by  Reaching, gripping, lifting, typing, mousing, writing  Gripping, lifting, reaching After heavier activities   Relieved by none massaging stretching   Progression Gradually worsening Slowly improving Much improved      Posture  Slightly Forward Neck Posture and Rounded Forward Shoulders     Sensation  No longer decreased intermittently in Radial Nerve distribution per pt report    ROM  WNL's elbow, forearm and wrist, minimal pain but stiffness with elbow ext > flexion and wrist ext > flexion    Strength   (Measured in pounds)  Pain Report: - none  + mild    ++ moderate    +++ severe    12/5/2019 12/5/2019 1/22/2020 2/26/2020   Trials Left Right Right Right   1  2  3 68  65  62 25+  24+  28+ 17+  17+  12+ 53-  55-  56-   Average 65 26 15 55       12/5/2019 12/5/2019 1/22/2020 2/26/2020    Left Right Right Right   Elbow Ext 72 5++ NT 18+     Resisted Testing  Pain Report:  - none    + mild    ++ moderate    +++ severe    12/5/2019 1/22/2020 2/26/2020   Elbow Extension - - -   Elbow  Flexion - - -   Supination  + slight + -   Pronation + - -   Wrist Ext with RD, Elbow at side +++ + + (with elbow ext)   Wrist Ext with UD, Elbow at side ++ - -   Wrist Flex with RD, Elbow at side - - -   Wrist Flex with UD, Elbow at side - - -   EDC with Elbow at side + + slight -   Long Finger Test + + slight -     Special Tests   12/5/2019 1/22/2020 2/26/2020   ULTT Radial Nerve ++ sh abd - -     Palpation  Pain Report:  - none    + mild    ++ moderate    +++ severe    12/5/2019 1/22/2020 2/26/2020   Pec Major + tightness + slight -   Proximal Triceps - - -   Spiral Groove - - -   Distal Triceps - - -   Anconeus - - -   ECRB at LEP - - -   ECU at LEP - - -   EDC at LEP + - -   Radial Head - - -   Extensor Wad + tightness - -   PIN Site ++ + -   MEP - - -   Flexors - - -     Please refer to the daily flowsheet for treatment provided today.     Assessment:  Response to therapy has been improvement to:  Flexibility:  less tightness in involved muscles  Strength:     Pain:  intensity of pain is decreased    Overall Assessment:  Patient's symptoms are resolving and patient is independent in home exercise program  STG/LTG:  STGoals have been reviewed and progress or achievement has occurred;  see goal sheet for details and updates.  I have re-evaluated this patient and find that the nature, scope, duration and intensity of the therapy is appropriate for the medical condition of the patient.    Plan:  Frequency/Duration:  Discharge from Hand Therapy; continue home program.    Recommendations for Continued Therapy  Home Program:   Warmth for stiffness to forearm extensors  Ice to lateral elbow after activity for pain  TFM to LEP  MFR to forearm extensors with tennis ball, avoid PIN site; consider foam roller for neck and upper back trigger points   PROM with stretch to forearm extensors and flexors, extend elbow and advanced extensor stretch as aaliyah, avoid hyper-ext of elbow  Partial proximal radial nerve gliding,  increase as aaliyah, avoid hyper-ext of elbow  Postural awareness, avoid rounded shoulders and forward head  Postural exercises with chin tucks and scapular retraction   Pec stretch in doorway or corner  Eccentric wrist extension strengthening   3 position  strengthening  Kinesiotaping to PIN site as needed  Avoid use of arm band  Wear OTC wrist splint sleeping as needed  Avoid activities that exacerbate pain in the elbow  Lift with elbows at sides and forearms neutral  Avoid reaching for keyboard and mouse  Use arm not wrist motion when typing/mousing

## 2020-03-01 ENCOUNTER — HEALTH MAINTENANCE LETTER (OUTPATIENT)
Age: 50
End: 2020-03-01

## 2020-03-02 ENCOUNTER — OFFICE VISIT (OUTPATIENT)
Dept: OBGYN | Facility: CLINIC | Age: 50
End: 2020-03-02
Payer: COMMERCIAL

## 2020-03-02 VITALS
WEIGHT: 143.2 LBS | DIASTOLIC BLOOD PRESSURE: 74 MMHG | SYSTOLIC BLOOD PRESSURE: 117 MMHG | HEART RATE: 73 BPM | BODY MASS INDEX: 22.43 KG/M2

## 2020-03-02 DIAGNOSIS — Z01.411 ENCOUNTER FOR GYNECOLOGICAL EXAMINATION WITH ABNORMAL FINDING: ICD-10-CM

## 2020-03-02 DIAGNOSIS — N94.10 DYSPAREUNIA IN FEMALE: ICD-10-CM

## 2020-03-02 DIAGNOSIS — N87.1 DYSPLASIA OF CERVIX, HIGH GRADE CIN 2: Primary | ICD-10-CM

## 2020-03-02 DIAGNOSIS — N95.1 SYMPTOMATIC MENOPAUSAL OR FEMALE CLIMACTERIC STATES: ICD-10-CM

## 2020-03-02 DIAGNOSIS — F41.9 ANXIETY: ICD-10-CM

## 2020-03-02 LAB
ALBUMIN SERPL-MCNC: 3.9 G/DL (ref 3.4–5)
ALP SERPL-CCNC: 52 U/L (ref 40–150)
ALT SERPL W P-5'-P-CCNC: 19 U/L (ref 0–50)
ANION GAP SERPL CALCULATED.3IONS-SCNC: 6 MMOL/L (ref 3–14)
AST SERPL W P-5'-P-CCNC: 21 U/L (ref 0–45)
BILIRUB SERPL-MCNC: 0.3 MG/DL (ref 0.2–1.3)
BUN SERPL-MCNC: 13 MG/DL (ref 7–30)
CALCIUM SERPL-MCNC: 9.4 MG/DL (ref 8.5–10.1)
CHLORIDE SERPL-SCNC: 106 MMOL/L (ref 94–109)
CO2 SERPL-SCNC: 30 MMOL/L (ref 20–32)
CREAT SERPL-MCNC: 0.83 MG/DL (ref 0.52–1.04)
ERYTHROCYTE [DISTWIDTH] IN BLOOD BY AUTOMATED COUNT: 13.1 % (ref 10–15)
GFR SERPL CREATININE-BSD FRML MDRD: 82 ML/MIN/{1.73_M2}
GLUCOSE SERPL-MCNC: 81 MG/DL (ref 70–99)
HCT VFR BLD AUTO: 39.3 % (ref 35–47)
HGB BLD-MCNC: 12.6 G/DL (ref 11.7–15.7)
MCH RBC QN AUTO: 29.7 PG (ref 26.5–33)
MCHC RBC AUTO-ENTMCNC: 32.1 G/DL (ref 31.5–36.5)
MCV RBC AUTO: 93 FL (ref 78–100)
PLATELET # BLD AUTO: 220 10E9/L (ref 150–450)
POTASSIUM SERPL-SCNC: 3.8 MMOL/L (ref 3.4–5.3)
PROT SERPL-MCNC: 7.2 G/DL (ref 6.8–8.8)
RBC # BLD AUTO: 4.24 10E12/L (ref 3.8–5.2)
SODIUM SERPL-SCNC: 142 MMOL/L (ref 133–144)
TSH SERPL DL<=0.005 MIU/L-ACNC: 2.57 MU/L (ref 0.4–4)
WBC # BLD AUTO: 6 10E9/L (ref 4–11)

## 2020-03-02 PROCEDURE — 36415 COLL VENOUS BLD VENIPUNCTURE: CPT | Performed by: OBSTETRICS & GYNECOLOGY

## 2020-03-02 PROCEDURE — 80053 COMPREHEN METABOLIC PANEL: CPT | Performed by: OBSTETRICS & GYNECOLOGY

## 2020-03-02 PROCEDURE — 84443 ASSAY THYROID STIM HORMONE: CPT | Performed by: OBSTETRICS & GYNECOLOGY

## 2020-03-02 PROCEDURE — 85027 COMPLETE CBC AUTOMATED: CPT | Performed by: OBSTETRICS & GYNECOLOGY

## 2020-03-02 PROCEDURE — 99396 PREV VISIT EST AGE 40-64: CPT | Performed by: OBSTETRICS & GYNECOLOGY

## 2020-03-02 PROCEDURE — 87624 HPV HI-RISK TYP POOLED RSLT: CPT | Performed by: OBSTETRICS & GYNECOLOGY

## 2020-03-02 PROCEDURE — 88175 CYTOPATH C/V AUTO FLUID REDO: CPT | Performed by: OBSTETRICS & GYNECOLOGY

## 2020-03-02 RX ORDER — ALPRAZOLAM 0.5 MG
0.5 TABLET ORAL
Qty: 90 TABLET | Refills: 1 | Status: SHIPPED | OUTPATIENT
Start: 2020-03-02 | End: 2023-10-25

## 2020-03-02 RX ORDER — GLYCERIN/PROPYLENE GLYCOL/WATR
SOLUTION, NON-ORAL VAGINAL
Qty: 1 TUBE | Refills: 1 | Status: SHIPPED | OUTPATIENT
Start: 2020-03-02 | End: 2023-01-30

## 2020-03-02 NOTE — PATIENT INSTRUCTIONS
If you have any questions regarding your visit, Please contact your care team.  [facility]: [phone]  [facility] CLINIC HOURS TELEPHONE NUMBER   Cephas Agbeh, M.D.      Clarence Leone-  [place]-         Monday-[place]    8:00a.m-4:45 p.m    Tuesday--[place]     8:00a.m-4:45 p.m.    Thursday-[place]    8:00a.m-4:45 p.m.    Friday-[place]    8:00a.m-4:45 p.m    [facility]   [address] [place] [address]   [phone]-Ask for [facility]   Fax [phone]   Abqjiob-[phone]     [facility]   [address] [place] [address] [place] [address]   [phone]    [facility]  [address] [facility] [address]  [phone]  Qqqcpwm-[phone]   Urgent Care locations:    [facility] Monday-Friday  5 pm - 9 pm  Saturday and Sunday   9 am - 5 pm   Monday-Friday   5 pm - 9 pm  Saturday and Sunday  9 am - 5 pm    [phone]   If you need a medication refill, please contact your pharmacy. Please allow 3 business days for your refill to be completed.  As always, Thank you for trusting us with your healthcare needs!

## 2020-03-02 NOTE — PROGRESS NOTES
An is a 49 year old  here for annual exam.   Complaints of vasomotor symptoms that are improving , but worsenig painful intercourse , and decrease libido despite use of lubricants.    She requests refill for xanax till she reaches out to PCP.  Had lipids done at [facility]. Reviewed in Care Every Where.    ROS: Ten point review of systems was reviewed and negative except the above.    Health Maintenance   Topic Date Due     MIGRAINE ACTION PLAN  1969     ASTHMA ACTION PLAN  2017     ASTHMA CONTROL TEST  2019     INFLUENZA VACCINE (1) 2019     PREVENTIVE CARE VISIT  2019     PHQ-2  2020     MAMMO DIAGNOSTIC  2020     ZOSTER IMMUNIZATION (1 of 2) 09/15/2019     HPV TEST  2020     PAP  2020     DTAP/TDAP/TD IMMUNIZATION (3 - Td) 2021     MAMMO SCREENING  2021     LIPID  2023     COLONOSCOPY  2026     HIV SCREENING  Completed     IPV IMMUNIZATION  Aged Out     MENINGITIS IMMUNIZATION  Aged Out      Last pap: 2019  10/31/16: LSIL Pap, + HR HPV 16 & other HR HPV. Plan colp  16: [facility] ECC - benign. Plan cotest in 1 year.   17 LSIL Pap, + HR HPV 16 & other HR HPV. Plan colp  18 [facility] no bx- Normal exam without visible pathology    Plan: Cotest in 1yr due by 18 LSIL pap, + HR HPV 16 and + HR HPV other. Plan colp.   19 [facility] Bx - BARB 2, ECC - HSIL. Plan LEEP  19 LEEP Bx - Negative, ECC - Non-diagnostic. Plan cotest in 1 year.     Last Mammogram: 2019  Last Dexa: none  Last Colonoscopy: 2016  Lab Results   Component Value Date    CHOL 207 2018     Lab Results   Component Value Date    HDL 80 2018     Lab Results   Component Value Date     2018     Lab Results   Component Value Date    TRIG 80 2018     Lab Results   Component Value Date    CHOLHDLRATIO 2.2 10/13/2014         OBHX:    OB History    Para Term  AB Living   3 2 2 0 1 2   SAB TAB Ectopic Multiple Live  Births   1 0 0 0 2      # Outcome Date GA Lbr Fredis/2nd Weight Sex Delivery Anes PTL Lv   3 Term  40w0d       TAHIR   2 Term  40w0d       TAHIR   1 1997               Past Surgical History:   Procedure Laterality Date     BREAST LUMPECTOMY, RT/LT Left     Benign lesion as per patient     COLPORRHAPHY POSTERIOR N/A 10/21/2014    Procedure: COLPORRHAPHY POSTERIOR;  Surgeon: Fabian Harding MD;  Location: [facility] OR     CYSTOSCOPY, SLING TRANSVAGINAL N/A 10/21/2014    Procedure: CYSTOSCOPY, SLING TRANSVAGINAL;  Surgeon: Fabian Harding MD;  Location: [facility] OR     EXTRACTION(S) DENTAL       PERINEORRHAPHY N/A 10/21/2014    Procedure: PERINEORRHAPHY;  Surgeon: Fabian Harding MD;  Location: [facility] OR     RECONSTRUCT BREAST BILATERAL         PMH: Her past medical, surgical, and obstetric histories were reviewed and are documented in their appropriate chart areas.    ALL/Meds: Her medication and allergy histories were reviewed and are documented in their appropriate chart areas.    SH/FMH: Her social and family history was reviewed and documented in its appropriate chart area.    PE: /74   Pulse 73   Wt 65 kg (143 lb 3.2 oz)   LMP  (LMP Unknown)   Breastfeeding No   BMI 22.43 kg/m    Body mass index is 22.43 kg/m .    General Appearance:  healthy, alert, active, no distress  Cardiovascular:  Regular rate and Rhythm  Neck: Supple, no adenopathy and thyroid normal  Lungs:  Clear, without wheeze, rale or rhonchi  Breast: normal breast exam/implants  Abdomen: Benign, Soft, flat, non-tender, No masses, organomegaly, No inguinal nodes and Bowel sounds normoactiveSoft, nontender.   Pelvic:       - Ext: Vulva and perineum are normal without lesion, mass or discharge        - Urethra: normal without discharge or scarring or hypermobility       - Urethral Meatus: normal appearance,        - Bladder: no tenderness, no masses       - Vagina: Normal mucosa, no discharge     atrophic       - Cervix: normal       -  Uterus:Normal shape, position and consistencyfirm, nontender, nongravid uterus without CMT       - Adnexa: Normal without masses or tenderness       - Rectal: deferred  Nurse for exam  A/P:  [facility],     ICD-10-CM    1. Dysplasia of cervix, high grade BARB 2 N87.1 Pap imaged thin layer diagnostic with HPV (select HPV order below)     HPV High Risk Types DNA Cervical   2. Encounter for gynecological examination with abnormal finding Z01.411 CBC with platelets     Comprehensive metabolic panel     TSH with free T4 reflex     MA Diagnostic Digital Bilateral   3. Dyspareunia in female N94.10 conjugated estrogens (PREMARIN) 0.625 MG/GM vaginal cream     Lubricants (ASTROGLIDE) GEL   4. Symptomatic menopausal or female climacteric states N95.1    5. Anxiety F41.9 ALPRAZolam (XANAX) 0.5 MG tablet     Reviewed Risks /Benefits /Alternatives of HRT.  Reviewed primary use of hormones to treat vasomotor symptoms.  Reviewed options for hormones including pill, patch, and ring.  Reviewed option for herbal products for menopause.  ACOG pamphlets on menopause and herbal products for menopause given to patient.  Patient accepts topical HRT.       - Encouraged self-breast exam   - Encouraged low fat diet, regular exercise, and adequate calcium intake.    CEPHAS AGBEH, MD.

## 2020-03-04 LAB
COPATH REPORT: NORMAL
PAP: NORMAL

## 2020-03-05 PROBLEM — R87.810 CERVICAL HIGH RISK HPV (HUMAN PAPILLOMAVIRUS) TEST POSITIVE: Status: ACTIVE | Noted: 2020-03-05

## 2020-03-05 LAB
FINAL DIAGNOSIS: ABNORMAL
HPV HR 12 DNA CVX QL NAA+PROBE: NEGATIVE
HPV16 DNA SPEC QL NAA+PROBE: POSITIVE
HPV18 DNA SPEC QL NAA+PROBE: NEGATIVE
SPECIMEN DESCRIPTION: ABNORMAL
SPECIMEN SOURCE CVX/VAG CYTO: ABNORMAL

## 2020-04-14 ENCOUNTER — TELEPHONE (OUTPATIENT)
Dept: FAMILY MEDICINE | Facility: CLINIC | Age: 50
End: 2020-04-14

## 2020-04-14 NOTE — TELEPHONE ENCOUNTER
I do not do LEEP, though looking at pt's chart she is due for colposcopy and I can do that.  Would be ok to wait until July though understand if pt wants done now.

## 2020-04-14 NOTE — TELEPHONE ENCOUNTER
Reason for call:  Other   Patient called regarding (reason for call): appointment  Additional comments: Patient would like to schedule a [facility]    Phone number to reach patient:  Cell number on file:    Telephone Information:   Mobile [phone]       Best Time:  anytime    Can we leave a detailed message on this number?  YES    Travel screening: Not Applicable

## 2020-04-16 ENCOUNTER — VIRTUAL VISIT (OUTPATIENT)
Dept: FAMILY MEDICINE | Facility: OTHER | Age: 50
End: 2020-04-16

## 2020-04-16 NOTE — PROGRESS NOTES
"Date: 2020 17:30:04  Clinician: Linda Ford  Clinician NPI: [id number]  Patient: An Cornejo  Patient : 1970  Patient Address: [address] [place] [address]  Patient Phone: [phone]  Visit Protocol: URI  Patient Summary:  An is a 49 year old ( : 1970 ) female who initiated a Visit for COVID-19 (Coronavirus) evaluation and screening. When asked the question \"Please sign me up to receive news, health information and promotions. \", An responded \"No\".    An states her symptoms started gradually 10-13 days ago. After her symptoms started, they improved and then got worse again.   Her symptoms consist of myalgia, a cough, and malaise. She is experiencing mild difficulty breathing with activities but can speak normally in full sentences. An also feels feverish.   Symptom details     Cough: An coughs every 5-10 minutes and her cough is not more bothersome at night. Phlegm does not come into her throat when she coughs. She does not believe her cough is caused by post-nasal drip.     Temperature: Her current temperature is 98.5 degrees Fahrenheit.      An denies having rhinitis, chills, diarrhea, facial pain or pressure, vomiting, nausea, teeth pain, headache, wheezing, sore throat, nasal congestion, and ear pain. She also denies taking antibiotic medication for the symptoms and having recent facial or sinus surgery in the past 60 days.   Precipitating events  She has not recently been exposed to someone with influenza. An has not been in close contact with any high risk individuals.   Pertinent COVID-19 (Coronavirus) information  An has not traveled internationally or to the areas where COVID-19 (Coronavirus) is widespread, including cruise ship travel in the last 14 days before the start of her symptoms.   An does not work or volunteer as healthcare worker or a  and does not work or volunteer in a healthcare facility.   She does " not live with a healthcare worker.   An has not had a close contact with a laboratory-confirmed COVID-19 patient within 14 days of symptom onset. She also has not had a close contact with a suspected COVID-19 patient within 14 days of symptom onset.   Pertinent medical history  An had 2 sinus infections within the past year.   An typically gets a yeast infection when she takes antibiotics. She has used fluconazole (Diflucan) to treat previous yeast infections. She is not sure if she has needed 1 or 2 doses of fluconazole (Diflucan) for symptoms to resolve in the past.   An does not need a return to work/school note.   Weight: 139 lbs   An does not smoke or use smokeless tobacco.   Additional information as reported by the patient (free text): My athsma is well controlled   Weight: 139 lbs    MEDICATIONS: albuterol sulfate inhalation, ALLERGIES: NKDA  Clinician Response:  Dear An,   Dear An  Your symptoms show that you may have coronavirus (COVID-19). This illness can cause fever, cough and trouble breathing. Many people get a mild case and get better on their own. Some people can get very sick.  Will I be tested for COVID-19?  Because the virus is spreading, we are no longer testing most patients. You may request testing if:   You are very ill. For example, you're on chemotherapy, dialysis or home hospice care. (Contact your specialty clinic or program.)   You live in a nursing home or other long-term care facility. (Talk to your nurse manager or medical director.)   You're a health care worker. (Contact your employee health office.)   How can I protect others?  Without a test, we can't know for sure that you have COVID-19. For safety, it's very important to follow these rules.  First, stay home and away from others (self-isolate) until:   You've had no fever---and no medicine that reduces fever---for 3 full days (72 hours). And...    Your other symptoms have gotten better. For example,  your cough or breathing has improved. And...   At least 7 days have passed since your symptoms started.   During this time:   Don't go to work, school or anywhere else.    Stay away from others in your home. No hugging, kissing or shaking hands.   Don't let anyone visit.   Cover your mouth and nose with a mask, tissue or wash cloth to avoid spreading germs.   Wash your hands and face often. Use soap and water.   How can I take care of myself?   1.Take Tylenol (acetaminophen) for fever or pain. If you have liver or kidney problems, ask your family doctor if it's okay to take Tylenol.        Adults can take either:    650 mg (two 325 mg pills) every 4 to 6 hours, or...   1,000 mg (two 500 mg pills) every 8 hours as needed.    Note: Don't take more than 3,000 mg in one day.   For children, check the Tylenol bottle for the right dose. The dose is based on the child's age or weight.   2.If you have other health problems (like cancer, heart failure, an organ transplant or severe kidney disease): Call your specialty clinic if you don't feel better in the next 2 days.       3.Know when to call 911: If your breathing is so bad that it keeps you from doing normal activities, call 911 or go to the emergency room. Tell them that you've been staying home and may have COVID-19.       4.Sign up for [employer]. We know it's scary to hear that you might have COVID-19. We want to track your symptoms to make sure you're okay over the next 2 weeks. Please look for an email from [employer]---this is a free, online program that we'll use to keep in touch. To sign up, follow the link in the email. Learn more at [email or web address].   Where can I get more information?  To learn more about COVID-19 and how to care for yourself at home, please visit the CDC website at https://www.cdc.gov/coronavirus/2019-ncov/about/steps-when-sick.html.  For more options for care at [facility], please visit our website at  [email or web address]     COVID-19 (Coronavirus) General Information  With the increase in the number of COVID-19 (Coronavirus) cases, we understand you may have some questions. Below is some helpful information on COVID-19 (Coronavirus).  How can I protect myself and others from the COVID-19 (Coronavirus)?  Because there is currently no vaccine to prevent infection, the best way to protect yourself is to avoid being exposed to this virus. Put distance between yourself and other people if COVID-19 (Coronavirus) is spreading in your community. The virus is thought to spread mainly from person-to-person.     Between people who are in close contact with one another (within about 6 about) for a prolonged period (10 minutes or longer).    Through respiratory droplets produced when an infected person coughs or sneezes.     The CDC recommends the following additional steps to protect yourself and others:     Wash your hands often with soap and water for at least 20 seconds, especially after blowing your nose, coughing, or sneezing; going to the bathroom; and before eating or preparing food.  Use an alcohol-based hand  that contains at least 60 percent alcohol if soap and water are not available.        Avoid touching your eyes, nose and mouth with unwashed hands.    Avoid close contact with people who are sick.    Stay home when you are sick.    Cover your cough or sneeze with a tissue, then throw the tissue in the trash.    Clean and disinfect frequently touched objects and surfaces.     You can help stop COVID-19 (Coronavirus) by knowing the signs and symptoms:     Fever    Cough    Shortness of breath     Contact your healthcare provider if   Develop symptoms   AND   Have been in close contact with a person known to have COVID-19 (Coronavirus) or live in or have recently traveled from an area with ongoing spread of COVID-19 (Coronavirus). Call ahead before you go to a doctor's office or  emergency room. Tell them about your recent travel and your symptoms.   For the most up to date information, visit the CDC's website.  Self-monitoring  Self-monitoring means people should monitor themselves for fever by taking their temperatures twice a day and remain alert for a cough or difficulty breathing.  It is important to check your health two times each day for 14 days after a potential exposure to a person with COVID-19 (Coronavirus) or after travel from a location where COVID-19 (Coronavirus) is widespread. If you have been exposed to a person with COVID-19 (Coronavirus), it may take up to 14 days to know if you will get sick. Follow the steps below to check and record your health.     Take your temperature with a thermometer twice a day, once in the morning and once in the evening, and watch for a cough or difficulty breathing for 14 days.    Write down your temperature and any COVID-19 symptoms you may have: feeling feverish, coughing, or difficulty breathing.    Stay home from work or school.    Do not take public transportation, taxis, or ride-shares.    Avoid crowded places (such as shopping centers and movie theaters) and limit your activities in public.    Keep your distance from others (about 6 feet or 2 meters).    If you get sick with fever, cough, or trouble breathing, contact your healthcare provider and tell them about your recent travel and/or your symptoms.    If you need to seek medical care for other reasons, such as dialysis, call ahead to your doctor and tell them about your recent travel.     Steps to help prevent the spread of COVID-19 (Coronavirus) if you are sick  If you are sick with COVID-19 (Coronavirus) or suspect you are infected with the virus that causes COVID-19 (Coronavirus), follow the steps below to help prevent the disease from spreading&nbsp;to people in your home and community.     Stay home except to get medical care. Home isolation may be started in consultation with  "your healthcare clinician.    Separate yourself from other people and animals in your home.    Call ahead before visiting your doctor if you have a medical appointment.    Wear a facemask when you are around other people.    Cover your cough and sneezes.    Clean your hands often.    Avoid sharing personal household items.    Clean and disinfect frequently touched objects and surfaces everyday.    You will need to have someone drop off medications or household supplies (if needed) at your house without coming inside or in contact with you or others living in your house.    Monitor your symptoms and seek prompt medical care if your illness is worsening (e.g. Difficulty breathing).    Discontinue home isolation only in consultation with your healthcare provider.     For more detailed and up to date information on what to do if you are sick, visit this link: What to Do If You Are Sick With COVID-19.  Do I need to be tested for COVID-19 (Coronavirus)?     Not everyone needs to be tested for COVID-19 (Coronavirus). Decisions on which patients receive testing will be based on the local spread of COVID-19 (Coronavirus) as well as the symptoms. Your healthcare provider will make the final decision on whether you should be tested.    In the meantime, if you have concerns that you may have been exposed, it is reasonable to practice \"social distancing.\"&nbsp; If you are ill with a cold or flu-like illness, please monitor your symptoms and call your healthcare provider if your symptoms worsen.    For more up to date information, visit this link: COVID-19 (Coronavirus) Frequently Asked Questions and Answers.      Diagnosis: Cough  Diagnosis ICD: R05  "

## 2020-05-01 ENCOUNTER — TELEPHONE (OUTPATIENT)
Dept: FAMILY MEDICINE | Facility: CLINIC | Age: 50
End: 2020-05-01

## 2020-05-01 ENCOUNTER — VIRTUAL VISIT (OUTPATIENT)
Dept: FAMILY MEDICINE | Facility: CLINIC | Age: 50
End: 2020-05-01
Payer: COMMERCIAL

## 2020-05-01 DIAGNOSIS — J45.31 MILD PERSISTENT ASTHMA WITH EXACERBATION: ICD-10-CM

## 2020-05-01 DIAGNOSIS — R05.9 COUGH: Primary | ICD-10-CM

## 2020-05-01 PROCEDURE — 99214 OFFICE O/P EST MOD 30 MIN: CPT | Mod: GT | Performed by: FAMILY MEDICINE

## 2020-05-01 RX ORDER — ALBUTEROL SULFATE 90 UG/1
2 AEROSOL, METERED RESPIRATORY (INHALATION) EVERY 4 HOURS PRN
Qty: 1 INHALER | Refills: 2 | Status: SHIPPED | OUTPATIENT
Start: 2020-05-01 | End: 2021-04-07

## 2020-05-01 RX ORDER — BENZONATATE 200 MG/1
200 CAPSULE ORAL 3 TIMES DAILY PRN
Qty: 30 CAPSULE | Refills: 0 | Status: SHIPPED | OUTPATIENT
Start: 2020-05-01 | End: 2021-04-07

## 2020-05-01 NOTE — PROGRESS NOTES
"An Cornejo is a 49 year old female who is being evaluated via a billable video visit.      The patient has been notified of following:     \"This video visit will be conducted via a call between you and your physician/provider. We have found that certain health care needs can be provided without the need for an in-person physical exam.  This service lets us provide the care you need with a video conversation.  If a prescription is necessary we can send it directly to your pharmacy.  If lab work is needed we can place an order for that and you can then stop by our lab to have the test done at a later time.    Video visits are billed at different rates depending on your insurance coverage.  Please reach out to your insurance provider with any questions.    If during the course of the call the physician/provider feels a video visit is not appropriate, you will not be charged for this service.\"    Patient has given verbal consent for Video visit? Yes    How would you like to obtain your AVS? [place]    Patient would like the video invitation sent by: Text to cell phone: [phone]    Will anyone else be joining your video visit? No        Subjective     An Cornejo is a 49 year old female who presents to clinic today for the following health issues:    HPI  RESPIRATORY SYMPTOMS      Duration: 1 month    Description  cough, headache and tightness in chest, shortness of breath, mild fever on and off (99), worse at the beginning of the month, now cough is main issue, cannot talk for prolonged periods without coughing.     Severity: mild    Accompanying signs and symptoms: None    History (predisposing factors):  asthma    Precipitating or alleviating factors: None    Therapies tried and outcome:  advil or tylenol PRN ( unsure if she should be using her inhaler) has not tried inhaler for cough relief.    Has stopped nasal sprays and verapamil due to no symptoms, no runny nose, itchy eyes or sneezing.  No reflux " symptoms.   Migraine HA have resolved.  ACT Total Scores 8/29/2016 4/26/2017 9/28/2018   ACT TOTAL SCORE - - -   ASTHMA ER VISITS - - -   ASTHMA HOSPITALIZATIONS - - -   ACT TOTAL SCORE (Goal Greater than or Equal to 20) 25 23 24   In the past 12 months, how many times did you visit the emergency room for your asthma without being admitted to the hospital? 0 0 0   In the past 12 months, how many times were you hospitalized overnight because of your asthma? 0 0 0         Video Start Time: 1:28 PM        Patient Active Problem List   Diagnosis     CARDIOVASCULAR SCREENING; LDL GOAL LESS THAN 160     Anxiety     Mild persistent asthma     Sore throat (viral)     Asthma, exercise induced     Exposure to pertussis     Rectocele     KATIE (stress urinary incontinence, female)     Cervical high risk HPV (human papillomavirus) test positive     Past Surgical History:   Procedure Laterality Date     BREAST LUMPECTOMY, RT/LT Left     Benign lesion as per patient     COLPORRHAPHY POSTERIOR N/A 10/21/2014    Procedure: COLPORRHAPHY POSTERIOR;  Surgeon: Fabian Harding MD;  Location: [facility]     CYSTOSCOPY, SLING TRANSVAGINAL N/A 10/21/2014    Procedure: CYSTOSCOPY, SLING TRANSVAGINAL;  Surgeon: Fabian Harding MD;  Location: [facility] OR     EXTRACTION(S) DENTAL       PERINEORRHAPHY N/A 10/21/2014    Procedure: PERINEORRHAPHY;  Surgeon: Fabian Harding MD;  Location: [facility] OR     RECONSTRUCT BREAST BILATERAL         Social History     Tobacco Use     Smoking status: Never Smoker     Smokeless tobacco: Never Used     Tobacco comment: nonsmoking household   Substance Use Topics     Alcohol use: Yes     Alcohol/week: 0.0 standard drinks     Comment: 2 glasses of gin and tonic may be once per month     Family History   Problem Relation Age of Onset     Depression Mother      Hypertension Mother      Lipids Mother      Arthritis Mother      Asthma Mother      Pulmonary Embolism Mother      Breast Cancer Maternal Grandmother          "postmenopausal     Cancer Maternal Grandmother      Respiratory Paternal Grandfather         emphysema     Hypertension Father      Cancer Sister         Thyroid     Neurologic Disorder Sister         MS         Current Outpatient Medications   Medication Sig Dispense Refill     albuterol (PROAIR HFA/PROVENTIL HFA/VENTOLIN HFA) 108 (90 Base) MCG/ACT inhaler Inhale 2 puffs into the lungs every 4 hours as needed for shortness of breath / dyspnea 1 Inhaler 2     ALPRAZolam (XANAX) 0.5 MG tablet Take 1 tablet (0.5 mg) by mouth nightly as needed for sleep 90 tablet 1     benzonatate (TESSALON) 200 MG capsule Take 1 capsule (200 mg) by mouth 3 times daily as needed for cough 30 capsule 0     conjugated estrogens (PREMARIN) 0.625 MG/GM vaginal cream Place 0.5 g vaginally twice a week 30 g 1     Lubricants (ASTROGLIDE) GEL Externally apply topically nightly as needed (prn) 1 Tube 1     order for DME Equipment being ordered: arm band, elbow 1 Device 0     rizatriptan (MAXALT) 10 MG tablet Take 1 tablet (10 mg) by mouth at onset of headache for migraine May repeat in 2 hours. Max 3 tablets/24 hours. 18 tablet 1     BP Readings from Last 3 Encounters:   03/02/20 117/74   11/22/19 112/74   01/14/19 111/76    Wt Readings from Last 3 Encounters:   03/02/20 65 kg (143 lb 3.2 oz)   11/22/19 63.5 kg (140 lb)   01/14/19 64.4 kg (142 lb)                    Reviewed and updated as needed this visit by Provider  Tobacco  Allergies  Meds  Problems  Med Hx  Surg Hx  Fam Hx         Review of Systems   ROS COMP: Constitutional, HEENT, cardiovascular, pulmonary, gi and gu systems are negative, except as otherwise noted.      Objective    There were no vitals taken for this visit.  Estimated body mass index is 22.43 kg/m  as calculated from the following:    Height as of 11/22/19: 1.702 m (5' 7\").    Weight as of 3/2/20: 65 kg (143 lb 3.2 oz).  Physical Exam     GENERAL: healthy, alert and no distress  EYES: Eyes grossly normal to " inspection, conjunctivae and sclerae normal  RESP: no audible wheeze or visible cyanosis.  + frequent dry cough, worse with more speaking. No visible retractions or increased work of breathing.  Able to speak fully in complete sentences.  NEURO: Cranial nerves grossly intact, mentation intact and speech normal  PSYCH: mentation appears normal, affect normal/bright, judgement and insight intact, normal speech and appearance well-groomed      Diagnostic Test Results:  Labs reviewed in Epic        Assessment & Plan     (R05) Cough  (primary encounter diagnosis)  Comment: uncertain etiology, could be COVID and in recovery  Plan: benzonatate (TESSALON) 200 MG capsule, COVID-19        Virus (Coronavirus) Antibody        Test for antibody,   tessalon for cough relief, consider adding Advair as cough could be worsening asthma, pt to [employer] message me on Monday if no improvement and will start Advair 100/50 at that time    (J45.31) Mild persistent asthma with exacerbation  Comment: refill of albuterol as hers are likely   Plan: albuterol (PROAIR HFA/PROVENTIL HFA/VENTOLIN         HFA) 108 (90 Base) MCG/ACT inhaler                 See Patient Instructions    Return in about 3 days (around 2020) for if not improved or symptoms worsen.    Melania Vega MD  [facility]      Video-Visit Details    Type of service:  Video Visit    Video End Time:1:40 PM    Originating Location (pt. Location): Home    Distant Location (provider location):  [facility]     Platform used for Video Visit: Doximity    Return in about 3 days (around 2020) for if not improved or symptoms worsen.       Melania Vega MD

## 2020-05-09 NOTE — PATIENT INSTRUCTIONS
Nothing in the vagina (no sex/tampons) for 2 weeks.  Monitor for symptoms of infection to include a foul smelling discharge, abdominal tenderness or fever without other explanation.  Monitor for heavy bleeding (soaking a maxi pad every hour for 2-3 hours).         Start Advair daily for better asthma control, cough improvement.      Use oxybutynin at night for night sweats      Start vaginal estrogen per label instructions.

## 2020-05-09 NOTE — PROGRESS NOTES
S:  An Cornejo is a 49 year old female here for a colposcopy.  Having persistent HR HPV 16. LEEP was negative for dysplasia.     Last 3 Pap and HPV Results:   PAP / HPV Latest Ref Rng & Units 3/2/2020 12/18/2018 12/6/2017   PAP - NIL LSIL(A) LSIL(A)   HPV 16 DNA NEG:Negative Positive(A) Positive(A) Positive(A)   HPV 18 DNA NEG:Negative Negative Negative Negative   OTHER HR HPV NEG:Negative Negative Positive(A) Positive(A)     10/31/16: LSIL Pap, + HR HPV 16 & other HR HPV. Plan colp  11/28/16: [facility] ECC - benign. Plan cotest in 1 year.   12/6/17 LSIL Pap, + HR HPV 16 & other HR HPV. Plan colp  1/17/18 [facility] no bx- Normal exam without visible pathology    Plan: Cotest in 1yr due by 12/6/18 12/18/18 LSIL pap, + HR HPV 16 and + HR HPV other. Plan colp.   1/7/19 [facility] Bx - BARB 2, ECC - HSIL. Plan LEEP  1/14/19 LEEP Bx - Negative, ECC - Non-diagnostic. Plan cotest in 1 year.   3/2/20 NIL Pap, + HR HPV 16 (Neg 18 & other). Plan colp  5/11/20 [facility] appt    Cervical risk factors: Tobacco Use no      Previous STD none      Previous dysplasia: yes      Previous colposcopy: yes:  date see problem list overview.       Previous treatment:  LEEP date 1/14/2019        Current birth control: none  No LMP recorded. Patient is premenopausal.  I discussed the history of HPV and the risk of dysplasia/cancer.  I explained the indications for the colposcopy and the procedure in detail.  I discussed the potential risks including bleeding, infection and cramping. I have recommend abstinence for 2 weeks and no vigorous activity for 48 hours.  Consent form was signed by patient and all questions were answered.      Also f/u for chronic cough and wheeze since January.  Using albuterol daily, multiple times.  Has not started a controller med.    Also c/o painful sex due to vaginal dryness, significant night sweats every night disrupting sleep.    PMH/PSH/Meds/All were reviewed and updated at this visit.      O:  Vitals noted.  Patient alert,  oriented, and in no acute distress. She was placed on the exam table in the dorsal position and a sterile speculum inserted into the vagina. The cervix was easily visualized.  Acetic acid was applied to better visualize the transformation zone.  Colposcopy was performed in the usual fashion.   LUNG: no rales/ronchi, + wheeze bilaterally  CV: RRR, no m/r/g  Unenhanced examination of the cervix was normal without lesions    Pap repeated?:  No  SCJ seen?:  yes  Endocervical speculum needed?:  No  ECC done?:  No  Lugol's solution used?:  Yes   Satisfactory examination?:  yes    Vaginal vault: normal to cursory inspection   Urethra normal?:  yes  Labia normal?:  yes  Perineum normal?:  yes    FINDINGS:    Cervix: no visible lesions, atrophic appearance  Procedure: biopsies taken (not including ECC): 0.    Procedure summary: Patient tolerated procedure well     (J45.30) Mild persistent asthma without complication  (primary encounter diagnosis)  Comment: not controlled  Plan: fluticasone-salmeterol (ADVAIR) 100-50 MCG/DOSE        inhaler, OFFICE/OUTPT VISIT,EST,LEVL III        Start advair 1 puff bid, repeat ACT in 4 weeks, given questionnaire to mail in.      (N95.2) Atrophic vaginitis  Comment: causing pain with sex  Plan: estradiol (ESTRACE) 0.1 MG/GM vaginal cream,         OFFICE/OUTPT VISIT,EST,LEVL III        Trial of estrace vaginal cream, consider HRT if not effective    (R61) Night sweats  Comment: bothersome nightly  Plan: oxybutynin (DITROPAN) 5 MG tablet, OFFICE/OUTPT        VISIT,EST,LEVL III        Trial of oxybutynin for night sweats, already had issues with orthostatic hypotension so will not trial clonidine.      (R87.810) Cervical high risk HPV (human papillomavirus) test positive  Comment:  Colposcopy of the cervix and upper/adjacent vagina Normal exam without visible pathology with no biopsy.  Plan: COLP CERVIX/UPPER VAGINA         repeat pap in 12 months        Copy of chart forwarded to primary care  provider.

## 2020-05-11 ENCOUNTER — OFFICE VISIT (OUTPATIENT)
Dept: FAMILY MEDICINE | Facility: CLINIC | Age: 50
End: 2020-05-11
Payer: COMMERCIAL

## 2020-05-11 VITALS
BODY MASS INDEX: 22.51 KG/M2 | RESPIRATION RATE: 16 BRPM | DIASTOLIC BLOOD PRESSURE: 62 MMHG | OXYGEN SATURATION: 99 % | WEIGHT: 143.4 LBS | SYSTOLIC BLOOD PRESSURE: 110 MMHG | HEART RATE: 71 BPM | HEIGHT: 67 IN | TEMPERATURE: 98 F

## 2020-05-11 DIAGNOSIS — J45.30 MILD PERSISTENT ASTHMA WITHOUT COMPLICATION: Primary | ICD-10-CM

## 2020-05-11 DIAGNOSIS — N95.2 ATROPHIC VAGINITIS: ICD-10-CM

## 2020-05-11 DIAGNOSIS — R61 NIGHT SWEATS: ICD-10-CM

## 2020-05-11 DIAGNOSIS — R87.810 CERVICAL HIGH RISK HPV (HUMAN PAPILLOMAVIRUS) TEST POSITIVE: ICD-10-CM

## 2020-05-11 PROCEDURE — 99213 OFFICE O/P EST LOW 20 MIN: CPT | Mod: 25 | Performed by: FAMILY MEDICINE

## 2020-05-11 PROCEDURE — 57452 EXAM OF CERVIX W/SCOPE: CPT | Performed by: FAMILY MEDICINE

## 2020-05-11 RX ORDER — ESTRADIOL 0.1 MG/G
CREAM VAGINAL
Qty: 42.5 G | Refills: 3 | Status: SHIPPED | OUTPATIENT
Start: 2020-05-11 | End: 2020-11-20

## 2020-05-11 RX ORDER — OXYBUTYNIN CHLORIDE 5 MG/1
5 TABLET ORAL AT BEDTIME
Qty: 30 TABLET | Refills: 1 | Status: SHIPPED | OUTPATIENT
Start: 2020-05-11 | End: 2020-06-02

## 2020-05-11 ASSESSMENT — MIFFLIN-ST. JEOR: SCORE: 1303.09

## 2020-05-11 NOTE — LETTER
My Migraine Action Plan      Date: 5/4/2020     My Name: An Cornejo   YOB: 1970  My Pharmacy: [id number] [place] [facility] [address] [facility] [facility]     My Preventive Medicine(s):  none  My Rescue Medicine(s):  Rizatriptan (Maxalt) 10 mg as needed for ha My Doctor: Melania Vega     My Clinic: [facility]  [address] [place] [address] [phone]        GREEN ZONE = Good Control    My headache plan is working.   I can do what I need to do.         I WILL:     ? Keep managing my triggers.  ? Write in my migraine diary each time I have a headache.  ? Keep taking my preventive medicine daily.  ? Take my rescue medicine as needed.             YELLOW ZONE = Not Enough Control    My headache plan isn t always working.   My headaches keep me from doing   some of the things I need to do.   I WILL:     ? Set goals to control my triggers and act on them.  ? Write in my migraine diary each time I have a headache and review it for                     patterns or new triggers.  ? Keep taking my preventive medicine daily.  ? Take my rescue medicine as needed.  l Make sure I stay hydrated.  ? Call my provider or clinic if my rescue medication did not help after taking it on             at least two separate headache days  ? Call my provider or clinic if I need to use my rescue medicine more than an                  average of 2 times per week over the course of one month.               RED ZONE = Poor or No Control    My headache plan has  failed. I can t do anything  when I have one. My  medicines aren t working.   I WILL:   ? Keep taking my preventive medicine daily.  ? Make sure I stay hydrated.  ? Call my provider or clinic if my medicines are not helping or if I am not able to              keep fluids down because of nausea.  ? Let my provider or clinic know within 2 weeks if I have gone to an urgent care or          emergency  department.          Provider specific instructions:      Do not take over the counter pain relievers more than 14 days per month. This includes NSAIDS (Ibuprofen, Motrin, Advil, Naproxen, Aleve, etc), acetaminophen (Tylenol), aspirin, and Excedrin.     If you use a triptan medicine (sumatriptan, rizatriptan, frovatriptan, zolmitriptan, eletriptan etc) take it as soon as you notice the migraine starting. You can take a second dose 2 hours after the first dose if you still have any migraine symptoms.    It is fine to take 600 mg of ibuprofen (Advil) or 440 mg of naproxen (Aleve) with the triptan medicine.  Try not to use triptan medicines more than 2 days a week.    If you use your rescue medicine more than 2 times per week over the course of 1 month, set up an appointment with your provider to talk about starting a medication to prevent migraines.               Other Things I Can Do to Help My Migraines   Track Migraines and Triggers     Keep a headache journal of your symptoms. Try to track how often you have a headache, how long it lasts and what medicines you used. You can also track severity of headache.    You can use a smart phone navdeep: My Migraine Vaelntin. The information that you track in the navdeep can be uploaded for your provider through HeadCount.     You can also track your migraines on paper. The clinic can print a copy of the Migraine Diary for you. Link: [email or web address]      Lifestyle Changes 1. Try to drink enough water each day (48-70 fluid ounces a day)  2. Limit caffeine intact-one caffeinated beverage a day  3. Eat regular meals  4. Try to get cardiovascular exercise (walking, swimming, biking) 4-5 times a week  5. Try to have a routine sleep schedule going to bed at the same time each night and getting up the same time each morning with enough time to sleep in between  6. Sometimes foods can trigger migraines you can try to eliminate them if you think they make symptoms worse: dairy,  gluten, nuts (cashews, almonds), artificial sweeteners, artificial colors, high sugar content foods, certain alcohol, chocolate, and preservatives like MSG and nitrates.      Other Therapies  Talk to your doctor about adding other therapies to your headache treatment plan including:   - Cognitive behavioral therapy  - Biofeedback  - Practice therapeutic techniques at home such as Progressive Relaxation and Deep Breathing    Research suggests that combining one or more of these therapies with medication can be helpful to reduce the number and severity of migraines.     Learn More To learn more about headaches you can go to the following websites:  https://americanmigrainefoundation.org/   http://www.headaches.org/

## 2020-05-11 NOTE — NURSING NOTE
"Chief Complaint   Patient presents with     Colposcopy     Health Maintenance     AAP, ACT        Initial /62   Pulse 71   Temp 98  F (36.7  C) (Oral)   Resp 16   Ht 1.702 m (5' 7\")   Wt 65 kg (143 lb 6.4 oz)   SpO2 99%   BMI 22.46 kg/m   Estimated body mass index is 22.46 kg/m  as calculated from the following:    Height as of this encounter: 1.702 m (5' 7\").    Weight as of this encounter: 65 kg (143 lb 6.4 oz).  Medication Reconciliation: complete  Pedro Buckner CMA    "

## 2020-05-11 NOTE — LETTER
My Asthma Action Plan    Name: An Cornejo   YOB: 1970  Date: 5/12/2020   My doctor: Melania Vega MD   My clinic: [facility]        My Control Medicine: Fluticasone propionate + salmeterol (Advair Diskus or Wixela Inhub) -  100/50 mcg bid  My Rescue Medicine: Albuterol (Proair/Ventolin/Proventil HFA) 2-4 puffs EVERY 4 HOURS as needed. Use a spacer if recommended by your provider.   My Asthma Severity:   Mild Persistent  Know your asthma triggers: upper respiratory infections               GREEN ZONE   Good Control    I feel good    No cough or wheeze    Can work, sleep and play without asthma symptoms       Take your asthma control medicine every day.     1. If exercise triggers your asthma, take your rescue medication    15 minutes before exercise or sports, and    During exercise if you have asthma symptoms  2. Spacer to use with inhaler: If you have a spacer, make sure to use it with your inhaler             YELLOW ZONE Getting Worse  I have ANY of these:    I do not feel good    Cough or wheeze    Chest feels tight    Wake up at night   1. Keep taking your Green Zone medications  2. Start taking your rescue medicine:    every 20 minutes for up to 1 hour. Then every 4 hours for 24-48 hours.  3. If you stay in the Yellow Zone for more than 12-24 hours, contact your doctor.  4. If you do not return to the Green Zone in 12-24 hours or you get worse, start taking your oral steroid medicine if prescribed by your provider.           RED ZONE Medical Alert - Get Help  I have ANY of these:    I feel awful    Medicine is not helping    Breathing getting harder    Trouble walking or talking    Nose opens wide to breathe       1. Take your rescue medicine NOW  2. If your provider has prescribed an oral steroid medicine, start taking it NOW  3. Call your doctor NOW  4. If you are still in the Red Zone after 20 minutes and you have not reached your doctor:    Take your rescue medicine  again and    Call 911 or go to the emergency room right away    See your regular doctor within 2 weeks of an Emergency Room or Urgent Care visit for follow-up treatment.          Annual Reminders:  Meet with Asthma Educator,  Flu Shot in the Fall, consider Pneumonia Vaccination for patients with asthma (aged 19 and older).    Pharmacy: [id number] - [place], [place] - [address] [facility] AT [address] OF [facility]    Electronically signed by Melania Vega MD   Date: 05/12/20                      Asthma Triggers  How To Control Things That Make Your Asthma Worse    Triggers are things that make your asthma worse.  Look at the list below to help you find your triggers and what you can do about them.  You can help prevent asthma flare-ups by staying away from your triggers.      Trigger                                                          What you can do   Cigarette Smoke  Tobacco smoke can make asthma worse. Do not allow smoking in your home, car or around you.  Be sure no one smokes at a child s day care or school.  If you smoke, ask your health care provider for ways to help you quit.  Ask family members to quit too.  Ask your health care provider for a referral to Quit Plan to help you quit smoking, or call 1-040-248-PLAN.     Colds, Flu, Bronchitis  These are common triggers of asthma. Wash your hands often.  Don t touch your eyes, nose or mouth.  Get a flu shot every year.     Dust Mites  These are tiny bugs that live in cloth or carpet. They are too small to see. Wash sheets and blankets in hot water every week.   Encase pillows and mattress in dust mite proof covers.  Avoid having carpet if you can. If you have carpet, vacuum weekly.   Use a dust mask and HEPA vacuum.   Pollen and Outdoor Mold  Some people are allergic to trees, grass, or weed pollen, or molds. Try to keep your windows closed.  Limit time out doors when pollen count is high.   Ask you health care provider about taking  medicine during allergy season.     Animal Dander  Some people are allergic to skin flakes, urine or saliva from pets with fur or feathers. Keep pets with fur or feathers out of your home.    If you can t keep the pet outdoors, then keep the pet out of your bedroom.  Keep the bedroom door closed.  Keep pets off cloth furniture and away from stuffed toys.     Mice, Rats, and Cockroaches   Some people are allergic to the waste from these pests.   Cover food and garbage.  Clean up spills and food crumbs.  Store grease in the refrigerator.   Keep food out of the bedroom.   Indoor Mold  This can be a trigger if your home has high moisture. Fix leaking faucets, pipes, or other sources of water.   Clean moldy surfaces.  Dehumidify basement if it is damp and smelly.   Smoke, Strong Odors, and Sprays  These can reduce air quality. Stay away from strong odors and sprays, such as perfume, powder, hair spray, paints, smoke incense, paint, cleaning products, candles and new carpet.   Exercise or Sports  Some people with asthma have this trigger. Be active!  Ask your doctor about taking medicine before sports or exercise to prevent symptoms.    Warm up for 5-10 minutes before and after sports or exercise.     Other Triggers of Asthma  Cold air:  Cover your nose and mouth with a scarf.  Sometimes laughing or crying can be a trigger.  Some medicines and food can trigger asthma.

## 2020-05-12 DIAGNOSIS — R05.9 COUGH: ICD-10-CM

## 2020-05-12 PROCEDURE — 99000 SPECIMEN HANDLING OFFICE-LAB: CPT | Performed by: FAMILY MEDICINE

## 2020-05-12 PROCEDURE — 36415 COLL VENOUS BLD VENIPUNCTURE: CPT | Performed by: FAMILY MEDICINE

## 2020-05-12 PROCEDURE — 86769 SARS-COV-2 COVID-19 ANTIBODY: CPT | Mod: 90 | Performed by: FAMILY MEDICINE

## 2020-05-12 ASSESSMENT — ASTHMA QUESTIONNAIRES: ACT_TOTALSCORE: 17

## 2020-05-14 LAB
COVID-19 SPIKE RBD ABY TITER: NORMAL
COVID-19 SPIKE RBD ABY: NEGATIVE

## 2020-05-28 ENCOUNTER — ANCILLARY PROCEDURE (OUTPATIENT)
Dept: MAMMOGRAPHY | Facility: CLINIC | Age: 50
End: 2020-05-28
Attending: OBSTETRICS & GYNECOLOGY
Payer: COMMERCIAL

## 2020-05-28 DIAGNOSIS — Z01.411 ENCOUNTER FOR GYNECOLOGICAL EXAMINATION WITH ABNORMAL FINDING: ICD-10-CM

## 2020-05-28 PROCEDURE — 77067 SCR MAMMO BI INCL CAD: CPT | Performed by: RADIOLOGY

## 2020-05-28 PROCEDURE — 77063 BREAST TOMOSYNTHESIS BI: CPT | Performed by: RADIOLOGY

## 2020-06-02 DIAGNOSIS — R61 NIGHT SWEATS: ICD-10-CM

## 2020-06-02 RX ORDER — OXYBUTYNIN CHLORIDE 5 MG/1
TABLET ORAL
Qty: 30 TABLET | Refills: 1 | Status: SHIPPED | OUTPATIENT
Start: 2020-06-02 | End: 2021-04-07

## 2020-06-26 DIAGNOSIS — R61 NIGHT SWEATS: ICD-10-CM

## 2020-06-30 RX ORDER — OXYBUTYNIN CHLORIDE 5 MG/1
TABLET ORAL
Qty: 30 TABLET | Refills: 1 | OUTPATIENT
Start: 2020-06-30

## 2020-08-01 ENCOUNTER — TELEPHONE (OUTPATIENT)
Dept: FAMILY MEDICINE | Facility: CLINIC | Age: 50
End: 2020-08-01

## 2020-08-01 DIAGNOSIS — J45.30 MILD PERSISTENT ASTHMA WITHOUT COMPLICATION: ICD-10-CM

## 2020-08-01 NOTE — LETTER
August 4, 2020      An Cornejo  [address] [facility] [place] [address]      Dear An,     Your clinic record indicates that you are due for an asthma update. We have a survey tool called an ACT (or Asthma Control Test) we use to measure the level of control of your asthma. Please complete the enclosed questionnaire and mail it back to us in the self-addressed stamped envelope.     If you have questions about this letter please contact your provider.     Sincerely,     Melania Vega MD / anai  Your [facility] Team

## 2020-08-03 NOTE — TELEPHONE ENCOUNTER
"Requested Prescriptions   Pending Prescriptions Disp Refills    ADVAIR DISKUS 100-50 MCG/DOSE inhaler [Pharmacy Med Name: ADVAIR 100-50 DISKUS]  0     Sig: INHALE 1 PUFF INTO THE LUNGS EVERY 12 HOURS       Inhaled Steroids Protocol Failed - 8/1/2020  7:31 AM        Failed - Asthma control assessment score within normal limits in last 6 months     Please review ACT score.           Passed - Patient is age 12 or older        Passed - Medication is active on med list        Passed - Recent (6 mo) or future (30 days) visit within the authorizing provider's specialty     Patient had office visit in the last 6 months or has a visit in the next 30 days with authorizing provider or within the authorizing provider's specialty.  See \"Patient Info\" tab in inbasket, or \"Choose Columns\" in Meds & Orders section of the refill encounter.           Long-Acting Beta Agonist Inhalers Protocol  Failed - 8/1/2020  7:31 AM        Failed - Asthma control assessment score within normal limits in last 6 months     Please review ACT score.           Failed - Order for Serevent, Striverdi, or Foradil and pt has steroid inhaler        Passed - Patient is age 12 or older        Passed - Medication is active on med list        Passed - Recent (6 mo) or future (30 days) visit within the authorizing provider's specialty     Patient had office visit in the last 6 months or has a visit in the next 30 days with authorizing provider or within the authorizing provider's specialty.  See \"Patient Info\" tab in inbasket, or \"Choose Columns\" in Meds & Orders section of the refill encounter.                 "

## 2020-11-20 DIAGNOSIS — N95.2 ATROPHIC VAGINITIS: ICD-10-CM

## 2020-11-20 RX ORDER — ESTRADIOL 0.1 MG/G
2 CREAM VAGINAL
Qty: 42.5 G | Refills: 1 | Status: SHIPPED | OUTPATIENT
Start: 2020-11-23 | End: 2021-04-07

## 2020-11-20 NOTE — TELEPHONE ENCOUNTER
Previous prescription has  on med list. Pt told pharmacy she is down to one tube.  Tori ROCHAN, RN

## 2020-11-20 NOTE — TELEPHONE ENCOUNTER
Clarified with pharmacy that insurance requires a 90 day supply so she just needs a new prescription.  Tori Ulloa BSN, RN

## 2020-11-20 NOTE — LETTER
November 20, 2020      An Cornejo  [address] [facility] [place] [address]      Dear An,     Your clinic record indicates that you are due for an asthma update. We have a survey tool called an ACT (or Asthma Control Test) we use to measure the level of control of your asthma. Please complete the enclosed questionnaire and mail it back to us in the self-addressed stamped envelope.     If you have questions about this letter please contact your provider.     Sincerely,       Your [facility] Team

## 2020-11-20 NOTE — LETTER
November 20, 2020    An Cornejo  [address] [facility] [address]    Dear An,       We recently received a refill request for estradiol (ESTRACE) 0.1 MG/GM vaginal cream.  We have refilled this. Dr Vega said you are due for your preventative visit in 3/2021. Please schedule now due to availability.      Please call at your earliest convenience so that there will not be a delay with your future refills.          Thank you,   Your [facility] Team/  [phone]

## 2020-11-20 NOTE — TELEPHONE ENCOUNTER
Mailed letter to set up preventative visit and mailed ACT to patient's home. Will postpone message for 2 weeks.Mariza Rust MA/TC

## 2020-11-20 NOTE — TELEPHONE ENCOUNTER
ALTERNATIVE REQUESTED.    DRUG: ESTRADIOL 0.01 % CREAM    QUANTITY: 42.5    DATE WRITTEN: 05/11/2020

## 2020-12-14 ENCOUNTER — HEALTH MAINTENANCE LETTER (OUTPATIENT)
Age: 50
End: 2020-12-14

## 2021-02-16 ENCOUNTER — MYC MEDICAL ADVICE (OUTPATIENT)
Dept: OBGYN | Facility: CLINIC | Age: 51
End: 2021-02-16

## 2021-02-16 ENCOUNTER — PATIENT OUTREACH (OUTPATIENT)
Dept: FAMILY MEDICINE | Facility: CLINIC | Age: 51
End: 2021-02-16

## 2021-03-24 NOTE — PROGRESS NOTES
SUBJECTIVE:   CC: An Cornejo is an 50 year old woman who presents for preventive health visit.       Patient has been advised of split billing requirements and indicates understanding: Yes  Healthy Habits:     Getting at least 3 servings of Calcium per day:  Yes    Bi-annual eye exam:  NO    Dental care twice a year:  Yes    Sleep apnea or symptoms of sleep apnea:  None    Diet:  Regular (no restrictions)    Frequency of exercise:  2-3 days/week    Duration of exercise:  Greater than 60 minutes    Taking medications regularly:  No    Barriers to taking medications:  Other    Medication side effects:  Not applicable    PHQ-2 Total Score: 0    Additional concerns today:  Yes    Would like IBS added to problem list - OTC herbal cleanse started    Continues to have dyspareunia despite estrogen cream. Does not use frequently because of hassle and mess.       Today's PHQ-2 Score:   PHQ-2 ( 1999 [employer]) 4/7/2021   Q1: Little interest or pleasure in doing things 0   Q2: Feeling down, depressed or hopeless 0   PHQ-2 Score 0   Q1: Little interest or pleasure in doing things Not at all   Q2: Feeling down, depressed or hopeless Not at all   PHQ-2 Score 0       Abuse: Current or Past (Physical, Sexual or Emotional) - No  Do you feel safe in your environment? Yes    Have you ever done Advance Care Planning? (For example, a Health Directive, POLST, or a discussion with a medical provider or your loved ones about your wishes): No, advance care planning information given to patient to review.  Patient plans to discuss their wishes with loved ones or provider.      Social History     Tobacco Use     Smoking status: Never Smoker     Smokeless tobacco: Never Used     Tobacco comment: nonsmoking household   Substance Use Topics     Alcohol use: Yes     Alcohol/week: 0.0 standard drinks     Comment: 2 glasses of gin and tonic may be once per month         Alcohol Use 4/7/2021   Prescreen: >3 drinks/day or >7 drinks/week? No    Prescreen: >3 drinks/day or >7 drinks/week? -       Any new diagnosis of family breast, ovarian, or bowel cancer? Yes - Mom diagnosed with breast cancer    Reviewed orders with patient.  Reviewed health maintenance and updated orders accordingly - Yes    G 3 P 2   No LMP recorded. Patient is premenopausal.     Fasting: No   Td: tdap        Flu: declined      Covid: recommended      Shingrix: recommended      PPV: asthma -recommended      Last 3 Pap and HPV Results:   PAP / HPV Latest Ref Rng & Units 2021 3/2/2020 2018   PAP - NIL NIL LSIL(A)   HPV 16 DNA NEG:Negative Positive(A) Positive(A) Positive(A)   HPV 18 DNA NEG:Negative Negative Negative Negative   OTHER HR HPV NEG:Negative Negative Negative Positive(A)   10/31/16: LSIL Pap, + HR HPV 16 & other HR HPV. Plan colp  16: [facility] ECC - benign. Plan cotest in 1 year.   17 LSIL Pap, + HR HPV 16 & other HR HPV. Plan colp  18 [facility] no bx- Normal exam without visible pathology    Plan: Cotest in 1yr due by 18 LSIL pap, + HR HPV 16 and + HR HPV other. Plan colp.   19 [facility] Bx - BARB 2, ECC - HSIL. Plan LEEP  19 LEEP Bx - Negative, ECC - Non-diagnostic. Plan cotest in 1 year.   3/2/20 NIL Pap, + HR HPV 16 (Neg 18 & other). Plan colp  20 [facility] visually normal; no biopsies taken. Cotest due 3/2/21.               Cholesterol:   Lab Results   Component Value Date    CHOL 207 2018     Lab Results   Component Value Date    HDL 80 2018     Lab Results   Component Value Date     2018     Lab Results   Component Value Date    TRIG 80 2018     Lab Results   Component Value Date    CHOLHDLRATIO 2.2 10/13/2014         MM2020  Dexa:  NA     Flex/colo:       Seat Belt: Yes    Sunscreen use: Yes   Calcium Intake: adeq   Health Care Directive: Yes   Sexually Active: Yes     Current contraception: none  History of abnormal Pap smear: Yes: see prob list  Family history of colon/breast/ovarian  cancer: Yes: see [facility]  Regular self breast exam: Yes  History of abnormal mammogram: Yes: see reports      Labs reviewed in EPIC  BP Readings from Last 3 Encounters:   04/07/21 125/74   05/11/20 110/62   03/02/20 117/74    Wt Readings from Last 3 Encounters:   04/07/21 64.2 kg (141 lb 9.6 oz)   05/11/20 65 kg (143 lb 6.4 oz)   03/02/20 65 kg (143 lb 3.2 oz)                  Patient Active Problem List   Diagnosis     CARDIOVASCULAR SCREENING; LDL GOAL LESS THAN 160     Anxiety     Mild persistent asthma     Asthma, exercise induced     Exposure to pertussis     Rectocele     KATIE (stress urinary incontinence, female)     Cervical high risk HPV (human papillomavirus) test positive     Other irritable bowel syndrome     Past Surgical History:   Procedure Laterality Date     BIOPSY  2001     BREAST LUMPECTOMY, RT/LT Left     Benign lesion as per patient     COLONOSCOPY  2017     COLPORRHAPHY POSTERIOR N/A 10/21/2014    Procedure: COLPORRHAPHY POSTERIOR;  Surgeon: Fabian Harding MD;  [facility]     CYSTOSCOPY, SLING TRANSVAGINAL N/A 10/21/2014    Procedure: CYSTOSCOPY, SLING TRANSVAGINAL;  Surgeon: Fabian Harding MD;  [facility]     EXTRACTION(S) DENTAL       PERINEORRHAPHY N/A 10/21/2014    Procedure: PERINEORRHAPHY;  Surgeon: Fabian Harding MD;  [facility]     RECONSTRUCT BREAST BILATERAL         Social History     Tobacco Use     Smoking status: Never Smoker     Smokeless tobacco: Never Used     Tobacco comment: nonsmoking household   Substance Use Topics     Alcohol use: Yes     Alcohol/week: 0.0 standard drinks     Comment: 2 glasses of gin and tonic may be once per month     Family History   Problem Relation Age of Onset     Depression Mother      Hypertension Mother      Lipids Mother      Arthritis Mother      Asthma Mother      Pulmonary Embolism Mother      Breast Cancer Mother      Breast Cancer Maternal Grandmother         postmenopausal     Cancer Maternal Grandmother       Respiratory Paternal Grandfather         emphysema     Hypertension Father      Cancer Sister         Thyroid     Neurologic Disorder Sister         MS         Current Outpatient Medications   Medication Sig Dispense Refill     ADVAIR DISKUS 100-50 MCG/DOSE inhaler INHALE 1 PUFF INTO THE LUNGS EVERY 12 HOURS 1 Inhaler 0     albuterol (PROAIR HFA/PROVENTIL HFA/VENTOLIN HFA) 108 (90 Base) MCG/ACT inhaler Inhale 2 puffs into the lungs every 4 hours as needed for shortness of breath / dyspnea 18 g 3     ALPRAZolam (XANAX) 0.5 MG tablet Take 1 tablet (0.5 mg) by mouth nightly as needed for sleep 90 tablet 1     estradiol (ESTRING) 2 MG vaginal ring Place 1 each vaginally every 3 months 1 each 3     Lubricants (ASTROGLIDE) GEL Externally apply topically nightly as needed (prn) 1 Tube 1     rizatriptan (MAXALT) 10 MG tablet Take 1 tablet (10 mg) by mouth at onset of headache for migraine May repeat in 2 hours. Max 3 tablets/24 hours. 18 tablet 1       Breast CA Risk Screening:  No flowsheet data found.      Mammogram Screening: Recommended annual mammography  Pertinent mammograms are reviewed under the imaging tab.    Reviewed and updated as needed this visit by clinical staff  Tobacco  Allergies  Meds  Problems  Med Hx  Surg Hx  Fam Hx  Soc Hx          Reviewed and updated as needed this visit by Provider  Tobacco  Allergies  Meds  Problems  Med Hx  Surg Hx  Fam Hx             Review of Systems   Constitutional: Negative for chills and fever.   HENT: Negative for congestion, ear pain, hearing loss and sore throat.    Eyes: Negative for pain and visual disturbance.   Respiratory: Negative for cough and shortness of breath.    Cardiovascular: Negative for chest pain, palpitations and peripheral edema.   Gastrointestinal: Positive for constipation. Negative for abdominal pain, diarrhea, heartburn, hematochezia and nausea.   Breasts:  Negative for tenderness, breast mass and discharge.   Genitourinary: Positive  "for pelvic pain. Negative for dysuria, frequency, genital sores, hematuria, urgency, vaginal bleeding and vaginal discharge.   Musculoskeletal: Negative for arthralgias, joint swelling and myalgias.   Skin: Negative for rash.   Neurological: Negative for dizziness, weakness, headaches and paresthesias.   Psychiatric/Behavioral: Negative for mood changes. The patient is not nervous/anxious.           OBJECTIVE:   /74   Pulse 74   Temp 98.2  F (36.8  C) (Oral)   Resp 16   Ht 1.702 m (5' 7\")   Wt 64.2 kg (141 lb 9.6 oz)   BMI 22.18 kg/m    Physical Exam  GENERAL: healthy, alert and no distress  EYES: Eyes grossly normal to inspection, PERRL and conjunctivae and sclerae normal  HENT: ear canals and TM's normal, nose and mouth without ulcers or lesions  NECK: no adenopathy, no asymmetry, masses, or scars and thyroid normal to palpation  RESP: lungs clear to auscultation - no rales, rhonchi or wheezes  BREAST: normal without masses, tenderness or nipple discharge and no palpable axillary masses or adenopathy  CV: regular rate and rhythm, normal S1 S2, no S3 or S4, no murmur, click or rub, no peripheral edema and peripheral pulses strong  ABDOMEN: soft, nontender, no hepatosplenomegaly, no masses and bowel sounds normal   (female): normal female external genitalia, normal urethral meatus, vaginal mucosa pink, moist, well rugated, and normal cervix/adnexa/uterus without masses or discharge  MS: no gross musculoskeletal defects noted, no edema  SKIN: no suspicious lesions or rashes  NEURO: Normal strength and tone, mentation intact and speech normal  PSYCH: mentation appears normal, affect normal/bright  LYMPH: no cervical, supraclavicular, axillary, or inguinal adenopathy    Diagnostic Test Results:  Labs reviewed in Epic    ASSESSMENT/PLAN:   (Z00.00) Routine general medical examination at a health care facility  (primary encounter diagnosis)  Comment: Reviewed preventative needs  Plan: Follow up in 1 year " "for annual visit    (N95.2) Atrophic vaginitis  Comment: Cream is a hassle for patient. Will trial ring.  Plan: estradiol (ESTRING) 2 MG vaginal ring    (J45.30) Mild persistent asthma without complication  Comment: Stable  Plan: albuterol (PROAIR HFA/PROVENTIL HFA/VENTOLIN         HFA) 108 (90 Base) MCG/ACT inhaler    (R87.810) Cervical high risk HPV (human papillomavirus) test positive  Comment: Last colp in 05/20 visually normal w/o biopsies taken  Plan: HPV High Risk Types DNA Cervical, Pap imaged         thin layer diagnostic with HPV (select HPV         order below)    Patient has been advised of split billing requirements and indicates understanding: Yes  COUNSELING:  Reviewed preventive health counseling, as reflected in patient instructions  Special attention given to:        Regular exercise       Healthy diet/nutrition        Sexual health    Estimated body mass index is 22.18 kg/m  as calculated from the following:    Height as of this encounter: 1.702 m (5' 7\").    Weight as of this encounter: 64.2 kg (141 lb 9.6 oz).        She reports that she has never smoked. She has never used smokeless tobacco.      Counseling Resources:  ATP IV Guidelines  Pooled Cohorts Equation Calculator  Breast Cancer Risk Calculator  BRCA-Related Cancer Risk Assessment: FHS-7 Tool  FRAX Risk Assessment  ICSI Preventive Guidelines  Dietary Guidelines for Americans, 2010  USDA's MyPlate  ASA Prophylaxis  Lung CA Screening    Melania Vega MD  [facility]    [facility]  "

## 2021-04-07 ENCOUNTER — OFFICE VISIT (OUTPATIENT)
Dept: FAMILY MEDICINE | Facility: CLINIC | Age: 51
End: 2021-04-07
Payer: COMMERCIAL

## 2021-04-07 VITALS
WEIGHT: 141.6 LBS | TEMPERATURE: 98.2 F | BODY MASS INDEX: 22.22 KG/M2 | RESPIRATION RATE: 16 BRPM | DIASTOLIC BLOOD PRESSURE: 74 MMHG | HEIGHT: 67 IN | HEART RATE: 74 BPM | SYSTOLIC BLOOD PRESSURE: 125 MMHG

## 2021-04-07 DIAGNOSIS — Z00.00 ROUTINE GENERAL MEDICAL EXAMINATION AT A HEALTH CARE FACILITY: Primary | ICD-10-CM

## 2021-04-07 DIAGNOSIS — J45.30 MILD PERSISTENT ASTHMA WITHOUT COMPLICATION: ICD-10-CM

## 2021-04-07 DIAGNOSIS — N95.2 ATROPHIC VAGINITIS: ICD-10-CM

## 2021-04-07 DIAGNOSIS — R87.810 CERVICAL HIGH RISK HPV (HUMAN PAPILLOMAVIRUS) TEST POSITIVE: ICD-10-CM

## 2021-04-07 PROCEDURE — 87624 HPV HI-RISK TYP POOLED RSLT: CPT | Performed by: FAMILY MEDICINE

## 2021-04-07 PROCEDURE — 99213 OFFICE O/P EST LOW 20 MIN: CPT | Mod: 25 | Performed by: FAMILY MEDICINE

## 2021-04-07 PROCEDURE — 99396 PREV VISIT EST AGE 40-64: CPT | Performed by: FAMILY MEDICINE

## 2021-04-07 RX ORDER — ALBUTEROL SULFATE 90 UG/1
2 AEROSOL, METERED RESPIRATORY (INHALATION) EVERY 4 HOURS PRN
Qty: 18 G | Refills: 3 | Status: SHIPPED | OUTPATIENT
Start: 2021-04-07 | End: 2023-10-25

## 2021-04-07 ASSESSMENT — ENCOUNTER SYMPTOMS
ARTHRALGIAS: 0
CONSTIPATION: 1
NAUSEA: 0
CHILLS: 0
HEMATURIA: 0
ABDOMINAL PAIN: 0
FREQUENCY: 0
DIZZINESS: 0
SORE THROAT: 0
PALPITATIONS: 0
PARESTHESIAS: 0
SHORTNESS OF BREATH: 0
DYSURIA: 0
WEAKNESS: 0
HEARTBURN: 0
NERVOUS/ANXIOUS: 0
HEMATOCHEZIA: 0
COUGH: 0
BREAST MASS: 0
HEADACHES: 0
EYE PAIN: 0
FEVER: 0
JOINT SWELLING: 0
MYALGIAS: 0
DIARRHEA: 0

## 2021-04-07 ASSESSMENT — MIFFLIN-ST. JEOR: SCORE: 1289.92

## 2021-04-07 NOTE — NURSING NOTE
"Chief Complaint   Patient presents with     Physical     Health Maintenance     ACT        Initial /74   Pulse 74   Temp 98.2  F (36.8  C) (Oral)   Resp 16   Ht 1.702 m (5' 7\")   Wt 64.2 kg (141 lb 9.6 oz)   BMI 22.18 kg/m   Estimated body mass index is 22.18 kg/m  as calculated from the following:    Height as of this encounter: 1.702 m (5' 7\").    Weight as of this encounter: 64.2 kg (141 lb 9.6 oz).  Medication Reconciliation: complete  Pedro Buckner CMA    "

## 2021-04-08 ENCOUNTER — TELEPHONE (OUTPATIENT)
Dept: FAMILY MEDICINE | Facility: CLINIC | Age: 51
End: 2021-04-08

## 2021-04-08 ASSESSMENT — ASTHMA QUESTIONNAIRES: ACT_TOTALSCORE: 24

## 2021-04-08 NOTE — TELEPHONE ENCOUNTER
Prior Authorization Retail Medication Request    Medication/Dose: estradiol (ESTRING) 2 MG vaginal ring  ICD code (if different than what is on RX):  Atrophic vaginitis [N95.2]   Previously Tried and Failed:    Rationale:      Insurance Name:  [employer]   Insurance ID:  [id number]      Pharmacy Information (if different than what is on RX)  [facility]  Phone:  [phone]

## 2021-04-08 NOTE — TELEPHONE ENCOUNTER
Central Prior Authorization Team  Phone: [phone]    PA Initiation    Medication: estradiol (ESTRING) 2 MG vaginal ring  Insurance Company: [facility] [employer] - Phone [phone] Fax [phone]  [id number] [place] [facility] [address]  Filling Pharmacy Phone: [phone]  Filling Pharmacy Fax:    Start Date: 4/8/2021

## 2021-04-09 LAB
COPATH REPORT: NORMAL
PAP: NORMAL

## 2021-04-13 ENCOUNTER — PATIENT OUTREACH (OUTPATIENT)
Dept: FAMILY MEDICINE | Facility: CLINIC | Age: 51
End: 2021-04-13

## 2021-04-13 NOTE — TELEPHONE ENCOUNTER
No pa needed- per call to insurance, the pa was cancelled due to medication does not require a pa. Insurance entered an override for this medication and the pharmacy received a paid claim.

## 2021-04-13 NOTE — TELEPHONE ENCOUNTER
4/7/21 NIL pap, + HR HPV 16 (neg 18 & other). [place] due by 7/7/21.   4/13/21 Pt notified by phone. Pt requesting a consult for a hysterectomy.

## 2021-04-15 PROBLEM — K58.8 OTHER IRRITABLE BOWEL SYNDROME: Status: ACTIVE | Noted: 2021-04-15

## 2021-05-06 ENCOUNTER — DOCUMENTATION ONLY (OUTPATIENT)
Dept: OTHER | Facility: CLINIC | Age: 51
End: 2021-05-06

## 2021-05-11 ENCOUNTER — OFFICE VISIT (OUTPATIENT)
Dept: OBGYN | Facility: CLINIC | Age: 51
End: 2021-05-11
Payer: COMMERCIAL

## 2021-05-11 ENCOUNTER — TELEPHONE (OUTPATIENT)
Dept: FAMILY MEDICINE | Facility: CLINIC | Age: 51
End: 2021-05-11

## 2021-05-11 VITALS
HEART RATE: 82 BPM | DIASTOLIC BLOOD PRESSURE: 76 MMHG | BODY MASS INDEX: 21.61 KG/M2 | OXYGEN SATURATION: 98 % | SYSTOLIC BLOOD PRESSURE: 115 MMHG | WEIGHT: 138 LBS

## 2021-05-11 DIAGNOSIS — Z98.890 S/P LEEP: ICD-10-CM

## 2021-05-11 DIAGNOSIS — R87.810 CERVICAL HIGH RISK HPV (HUMAN PAPILLOMAVIRUS) TEST POSITIVE: Primary | ICD-10-CM

## 2021-05-11 PROCEDURE — 99213 OFFICE O/P EST LOW 20 MIN: CPT | Performed by: OBSTETRICS & GYNECOLOGY

## 2021-05-11 NOTE — PATIENT INSTRUCTIONS
Please call if you any questions.    [facility]  [address] [place]   [address]  [phone]        Edelmira Wu,

## 2021-05-11 NOTE — PROGRESS NOTES
"Subjective  50 year old non-pregnant female presents today to discuss her pap smear and HPV results.  Patient has a history of BARB 2 and had a LEEP done in 2019.  She has had normal pap smears since then however persistently has had HPV 16.  She is questioning this and HPV in general.  We reviewed the role of HPV, incidence in the population and the natural history of the infection, and its transmission.  We also reviewed ways to minimize her future risk, the effect of HPV on the cervix and treatment options available, should they be indicated.  The pathophysiology of the cervix, including a discussion of the squamous and columnar cells, metaplasia and dysplasia have been reviewed.  We discussed a hysterectomy and the need to still have pap smears done given the history of BARB 2.  She verbalizes understanding and is ready to schedule her colposcopy.        ROS: 10 point ROS neg other than the symptoms noted above in the HPI.  Past Medical History:   Diagnosis Date     Abnormal Pap smear of cervix 2016, 2017, 12/18/18    See problem list     Anxiety     Use medication as needed     Asthma     Uses inhaler as needed     Cervical high risk HPV (human papillomavirus) test positive 2016 2017, 2018, 2020, 2021     Episodic tension type headache     Started when she was in her 30's intermittent     IUD     Mirena inserted 6/5/13     Moderate dysplasia of cervix 2019    see problem list     Neck pain     Both side of neck     Seizures (H) infancy    \"grew out of it\"     Status post colposcopy 11/28/2016    ECC - negative     Upper back pain     Over 5 years or more     Past Surgical History:   Procedure Laterality Date     BIOPSY  2001     BREAST LUMPECTOMY, RT/LT Left     Benign lesion as per patient     COLONOSCOPY  2017     COLPORRHAPHY POSTERIOR N/A 10/21/2014    Procedure: COLPORRHAPHY POSTERIOR;  Surgeon: Fabian Harding MD;  [facility]     CYSTOSCOPY, SLING TRANSVAGINAL N/A 10/21/2014    Procedure: " CYSTOSCOPY, SLING TRANSVAGINAL;  Surgeon: Fabian Harding MD;  [facility]     EXTRACTION(S) DENTAL       PERINEORRHAPHY N/A 10/21/2014    Procedure: PERINEORRHAPHY;  Surgeon: Fabian Harding MD;  [facility]     RECONSTRUCT BREAST BILATERAL       Family History   Problem Relation Age of Onset     Depression Mother      Hypertension Mother      Lipids Mother      Arthritis Mother      Asthma Mother      Pulmonary Embolism Mother      Breast Cancer Mother      Breast Cancer Maternal Grandmother         postmenopausal     Cancer Maternal Grandmother      Respiratory Paternal Grandfather         emphysema     Hypertension Father      Cancer Sister         Thyroid     Neurologic Disorder Sister         MS     Social History     Tobacco Use     Smoking status: Never Smoker     Smokeless tobacco: Never Used     Tobacco comment: nonsmoking household   Substance Use Topics     Alcohol use: Yes     Alcohol/week: 0.0 standard drinks     Comment: 2 glasses of gin and tonic may be once per month         Objective  Vitals: /76 (BP Location: Right arm, Cuff Size: Adult Regular)   Pulse 82   Wt 62.6 kg (138 lb)   LMP  (LMP Unknown)   SpO2 98%   Breastfeeding No   BMI 21.61 kg/m    BMI= Body mass index is 21.61 kg/m .    General appearance=well developed, well-nourished female  Gait=normal  Psych=mood is stable, alert and oriented x3      Assessment  1.)  HPV 16  2.)  History of BARB 2-s/p LEEP        Plan  1.)  Schedule colposcopy      20 minutes were spent on the date of the encounter doing chart review, history and exam, documentation, and further activities as noted above.      Nursing notes read and reviewed    Edelmira Wu DO

## 2021-05-11 NOTE — TELEPHONE ENCOUNTER
Pharmacy just got off phone with insurance; they had cancelled the prior auth for the estring (estradiol vaginal ring) because they didn't think it needed to be done but unfortunately it does so will need to redo the prior auth.   I did read the 4/13/21 documentation to Belle in pharmacy.  She states thinks patient didn't  the prescription and was put back.  Now requesting and insurance states will need to redo the PA.        Prior Authorization Retail Medication Request     Medication/Dose: estradiol (ESTRING) 2 MG vaginal ring  ICD code (if different than what is on RX):  Atrophic vaginitis [N95.2]   Previously Tried and Failed:    Rationale:       Insurance Name:  [employer]   Insurance ID:  [id number]        Pharmacy Information (if different than what is on RX)  [facility]  Phone:  [phone]

## 2021-05-12 NOTE — TELEPHONE ENCOUNTER
Prior Authorization Retail Medication Request     Medication/Dose: estradiol (ESTRING) 2 MG vaginal ring  ICD code (if different than what is on RX):  Atrophic vaginitis [N95.2]   Previously Tried and Failed:    Rationale:       Insurance Name:  [employer]   Insurance ID:  [id number]        Pharmacy Information (if different than what is on RX)  [facility]  Phone:  [phone]

## 2021-05-13 ENCOUNTER — TELEPHONE (OUTPATIENT)
Dept: FAMILY MEDICINE | Facility: CLINIC | Age: 51
End: 2021-05-13

## 2021-05-13 NOTE — TELEPHONE ENCOUNTER
PA Initiation    Medication: estradiol (ESTRING) 2 MG vaginal ring  Insurance Company: [employer] - Phone [phone] Fax [phone]  [id number] [place] - [address]  Filling Pharmacy Phone: [phone]  Filling Pharmacy Fax: [phone]  Start Date: 5/13/2021

## 2021-05-13 NOTE — TELEPHONE ENCOUNTER
PRIOR AUTHORIZATION DENIED    Medication: estradiol (ESTRING) 2 MG vaginal ring    Denial Date: 5/13/2021    Denial Rationale: Patient has to first try/fail 3 alternatives: estradiol vaginal cream, Imvexxy, Vagifem.        Appeal Information: If provider would like to appeal this decision we will need a detailed letter of medical necessity to start the process. Then re-route this request back to the PA pool.

## 2021-05-13 NOTE — TELEPHONE ENCOUNTER
Duplicate PA request. A user error has taken place: encounter opened in error, closed for administrative reasons.

## 2021-05-18 NOTE — TELEPHONE ENCOUNTER
Please contact pt to see if she filled this or not.   She has tried 2 of the 3. I can order the one she has not tried if she would like.

## 2021-05-19 ENCOUNTER — MYC MEDICAL ADVICE (OUTPATIENT)
Dept: FAMILY MEDICINE | Facility: CLINIC | Age: 51
End: 2021-05-19

## 2021-05-19 DIAGNOSIS — N95.2 ATROPHIC VAGINITIS: Primary | ICD-10-CM

## 2021-06-07 ENCOUNTER — OFFICE VISIT (OUTPATIENT)
Dept: OBGYN | Facility: OTHER | Age: 51
End: 2021-06-07
Payer: COMMERCIAL

## 2021-06-07 VITALS — WEIGHT: 138 LBS | SYSTOLIC BLOOD PRESSURE: 104 MMHG | BODY MASS INDEX: 21.61 KG/M2 | DIASTOLIC BLOOD PRESSURE: 70 MMHG

## 2021-06-07 DIAGNOSIS — R87.810 CERVICAL HIGH RISK HPV (HUMAN PAPILLOMAVIRUS) TEST POSITIVE: Primary | ICD-10-CM

## 2021-06-07 PROCEDURE — 57455 BIOPSY OF CERVIX W/SCOPE: CPT | Performed by: OBSTETRICS & GYNECOLOGY

## 2021-06-07 PROCEDURE — 88305 TISSUE EXAM BY PATHOLOGIST: CPT | Performed by: PATHOLOGY

## 2021-06-07 NOTE — PROGRESS NOTES
"I have been asked to see An in consultation by Dr. Vega to discuss the pap smear, findings and possible further evaluation.  The patient's pap smear on 4/7/2021 showed Normal with HPV 16.   I attempted to ensure that the patient was educated regarding the nature of her findings and implications to date.  We reviewed the role of HPV, incidence in the population and the natural history of the infection, and its transmission.  We also reviewed ways to minimize her future risk, the effect of HPV on the cervix and treatment options available, should they be indicated.    The pathophysiology of the cervix, including a discussion of the squamous and columnar cells, metaplasia and dysplasia have been reviewed, drawings, sketches and the pamphlets were reviewed with her.      No LMP recorded (lmp unknown). Patient is postmenopausal.  Current Birth Control Method: post menopausal status  History of veneral diseases: : No  History of genital warts:  No  Visible warts now?:  No  Family History of  Cervical, Uterine or Vaginal Cancer?: No    Past Medical History:   Diagnosis Date     Abnormal Pap smear of cervix 2016, 2017, 12/18/18    See problem list     Anxiety     Use medication as needed     Asthma     Uses inhaler as needed     Cervical high risk HPV (human papillomavirus) test positive 2016 2017, 2018, 2020, 2021     Episodic tension type headache     Started when she was in her 30's intermittent     IUD     Mirena inserted 6/5/13     Moderate dysplasia of cervix 2019    see problem list     Neck pain     Both side of neck     Seizures (H) infancy    \"grew out of it\"     Status post colposcopy 11/28/2016    ECC - negative     Upper back pain     Over 5 years or more       Past Surgical History:   Procedure Laterality Date     BIOPSY  2001     BREAST LUMPECTOMY, RT/LT Left     Benign lesion as per patient     COLONOSCOPY  2017     COLPORRHAPHY POSTERIOR N/A 10/21/2014    Procedure: COLPORRHAPHY POSTERIOR;  " Surgeon: Fabian Harding MD;  Location: [facility]     CYSTOSCOPY, SLING TRANSVAGINAL N/A 10/21/2014    Procedure: CYSTOSCOPY, SLING TRANSVAGINAL;  Surgeon: Fabian Harding MD;  Location: [facility]     EXTRACTION(S) DENTAL       PERINEORRHAPHY N/A 10/21/2014    Procedure: PERINEORRHAPHY;  Surgeon: Fabian Harding MD;  Location: [facility]     RECONSTRUCT BREAST BILATERAL          Outpatient Encounter Medications as of 6/7/2021   Medication Sig Dispense Refill     ADVAIR DISKUS 100-50 MCG/DOSE inhaler INHALE 1 PUFF INTO THE LUNGS EVERY 12 HOURS 1 Inhaler 0     albuterol (PROAIR HFA/PROVENTIL HFA/VENTOLIN HFA) 108 (90 Base) MCG/ACT inhaler Inhale 2 puffs into the lungs every 4 hours as needed for shortness of breath / dyspnea 18 g 3     ALPRAZolam (XANAX) 0.5 MG tablet Take 1 tablet (0.5 mg) by mouth nightly as needed for sleep 90 tablet 1     Estradiol 10 MCG INST Place 10 mcg vaginally twice a week 24 each 3     Lubricants (ASTROGLIDE) GEL Externally apply topically nightly as needed (prn) 1 Tube 1     rizatriptan (MAXALT) 10 MG tablet Take 1 tablet (10 mg) by mouth at onset of headache for migraine May repeat in 2 hours. Max 3 tablets/24 hours. 18 tablet 1     No facility-administered encounter medications on file as of 6/7/2021.         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 [employer]   Social Needs     Financial resource strain: None     Food insecurity     Worry: None     Inability: None     Transportation needs     Medical: None     Non-medical: None   Tobacco Use     Smoking status: Never Smoker     Smokeless tobacco: Never Used     Tobacco comment: nonsmoking household    Substance and Sexual Activity     Alcohol use: Yes     Alcohol/week: 0.0 standard drinks     Comment: 2 glasses of gin and tonic may be once per month     Drug use: Never     Sexual activity: Yes     Partners: Male     Birth control/protection: Post-menopausal   Lifestyle     Physical activity     Days per week: None     Minutes per session: None     Stress: None   Relationships     Social connections     Talks on phone: None     Gets together: None     Attends Alevism service: None     Active member of club or organization: None     Attends meetings of clubs or organizations: None     Relationship status: None     Intimate partner violence     Fear of current or ex partner: None     Emotionally abused: None     Physically abused: None     Forced sexual activity: None   Other Topics Concern     Parent/sibling w/ CABG, MI or angioplasty before 65F 55M? No   Social History Narrative     None        Family History   Problem Relation Age of Onset     Depression Mother      Hypertension Mother      Lipids Mother      Arthritis Mother      Asthma Mother      Pulmonary Embolism Mother      Breast Cancer Mother      Breast Cancer Maternal Grandmother         postmenopausal     Cancer Maternal Grandmother      Respiratory Paternal Grandfather         emphysema     Hypertension Father      Cancer Sister         Thyroid     Neurologic Disorder Sister         MS         Review Of Systems  Skin: negative  Eyes: negative  Ears/Nose/Throat: negative  Respiratory: negative  Cardiovascular: negative  Gastrointestinal: negative  Genitourinary: negative  Musculoskeletal: negative  Neurologic: negative  Psychiatric: negative  Hematologic/Lymphatic/Immunologic: negative  Endocrine: negative     Exam:   /70 (BP Location: Left arm, Cuff Size: Adult Regular)   Wt 62.6 kg (138 lb)   LMP  (LMP Unknown)   BMI 21.61 kg/m    GENERAL:  WNWD female NAD  HEENT: NC/AT, EOMI  SKIN: normal skin turgor  GAIT: Normal  NECK: Symmetrical, no  masses noted   VULVA: Normal Genitalia  BUS: Normal  URETHRA:  No hypermobility noted  URETHRAL MEATUS:  No masses noted  VAGINA: Normal mucosa, no discharge  CERVIX: Closed, mobile, no discharge  PERIANAL:  No masses or lesions seen  EXTREMITIES: no clubbing, cyanosis, or edema    Assessment:  NIL with HPV 16    Plan:  Recommend to Proceed with Colpo  The details of the colposcopic procedure were reviewed, the risks of missed diagnoses, pain, infection, and bleeding.        Procedure:  Procedure for colposcopy and biopsy has been explained to the patient and consent obtained.    Before the procedure, it was ensured that the patient was educated regarding the nature of her findings and implications to date.  We reviewed the role of HPV and the natural history of the infection.  We also reviewed ways to minimize her future risk, the effect of HPV on the cervix and treatment options available, should they be indicated.    The pathophysiology of the cervix, including a discussion of the squamous and columnar cells, metaplasia and dysplasia have been reviewed, drawings, sketches and the pamphlets were reviewed with her.  The details of the colposcopic procedure were reviewed, the risks of missed diagnoses, pain, infection, and bleeding.  Questions seemed to be answered before proceeding and the patient then consented to the procedure.     Procedure:    Speculum placed and cervix visualized. Vagina normal with no lesions noted. Acetic acid applied to the cervix. The colposcopy is satisfactory as the entire transformation zone is visualized. No lesions noticed.  No abnormal vascular changes.  No acetowhite epithelial changes noted.  Lugal's solution applied to patients cervix.  Biopsy is completed on the cervix at the 1 and 9o'clock position. Specimen placed aside to be sent to pathology. Hemostasis noted. Speculum removed    She tolerated the procedure well. There were no apparent complications.    She is instructed not  to use tampons or have intercourse for 5 days.  Instructed to call if she has persistent bleeding, foul vaginal discharge or any other concerns.    Findings:    No images are attached to the encounter.     Cervix: no visible lesions  Vaginal inspection: vaginal colposcopy not performed.  Vulvar colposcopy: vulvar colposcopy not performed. N/A  Procedure Summary: Patient tolerated procedure well.      Assessment:   NIL with HPV 16    Plan:  Specimens labelled and sent to pathology.  Will base further treatment on pathology findings.  Post biopsy instructions given to patient and call to discuss Pathology results.    Edelmira Wu,

## 2021-06-10 LAB — COPATH REPORT: NORMAL

## 2021-07-07 ENCOUNTER — PATIENT OUTREACH (OUTPATIENT)
Dept: OBGYN | Facility: OTHER | Age: 51
End: 2021-07-07

## 2021-07-14 DIAGNOSIS — N95.2 ATROPHIC VAGINITIS: ICD-10-CM

## 2021-07-15 RX ORDER — ESTRADIOL 10 UG/1
INSERT VAGINAL
Qty: 18 TABLET | Refills: 0 | Status: SHIPPED | OUTPATIENT
Start: 2021-07-15 | End: 2021-09-08

## 2021-08-07 ENCOUNTER — HEALTH MAINTENANCE LETTER (OUTPATIENT)
Age: 51
End: 2021-08-07

## 2021-09-08 DIAGNOSIS — N95.2 ATROPHIC VAGINITIS: ICD-10-CM

## 2021-09-08 RX ORDER — ESTRADIOL 10 UG/1
INSERT VAGINAL
Qty: 18 TABLET | Refills: 0 | Status: SHIPPED | OUTPATIENT
Start: 2021-09-08 | End: 2021-09-13

## 2021-09-08 NOTE — TELEPHONE ENCOUNTER
Reason for call:  Other   Patient called regarding (reason for call): pt would like to switch to mail order. [facility] needs new prescription for 90 day supply Vagifem - VTB 10 MCG      Additional comments: please fax over new prescription to  [phone]     Phone number to reach patient:  Other phone number:[phone]      Best Time: anytime    Can we leave a detailed message on this number?  YES    Travel screening: Not Applicable

## 2021-09-13 DIAGNOSIS — N94.10 DYSPAREUNIA IN FEMALE: ICD-10-CM

## 2021-09-13 DIAGNOSIS — N95.2 ATROPHIC VAGINITIS: ICD-10-CM

## 2021-09-13 NOTE — TELEPHONE ENCOUNTER
Provider:  Please clarify and send to her mail order pharmacy what dose of Estradiol 10 MCG INST the patient should be taking.  She is out of medication.  Please remove whatever Prescription(s) is not appropriate.  Thank you. Vidya Kraus R.N.     Pharmacy is calling stating that they received the nitial daily dose of theestradiol (VAGIFEM) 10 MCG TABS vaginal tablet.  They did not get the follow up dosing that is a lower maintenance dose. Reference number # [id number].    Call to patient and she states that she is taking Estradiol 10 MCG INSTSig - Route: Place 10 mcg vaginally twice a week - Vaginal.

## 2021-09-15 ENCOUNTER — IMMUNIZATION (OUTPATIENT)
Dept: NURSING | Facility: CLINIC | Age: 51
End: 2021-09-15
Payer: COMMERCIAL

## 2021-09-15 PROCEDURE — 91301 PR COVID VAC MODERNA 100 MCG/0.5 ML IM: CPT

## 2021-09-15 PROCEDURE — 0011A PR COVID VAC MODERNA 100 MCG/0.5 ML IM: CPT

## 2021-10-02 ENCOUNTER — HEALTH MAINTENANCE LETTER (OUTPATIENT)
Age: 51
End: 2021-10-02

## 2021-10-13 ENCOUNTER — IMMUNIZATION (OUTPATIENT)
Dept: NURSING | Facility: CLINIC | Age: 51
End: 2021-10-13
Attending: FAMILY MEDICINE
Payer: COMMERCIAL

## 2021-10-13 PROCEDURE — 0012A PR COVID VAC MODERNA 100 MCG/0.5 ML IM: CPT

## 2021-10-13 PROCEDURE — 91301 PR COVID VAC MODERNA 100 MCG/0.5 ML IM: CPT

## 2021-11-20 ENCOUNTER — TRANSFERRED RECORDS (OUTPATIENT)
Dept: HEALTH INFORMATION MANAGEMENT | Facility: CLINIC | Age: 51
End: 2021-11-20
Payer: COMMERCIAL

## 2021-11-23 ENCOUNTER — OFFICE VISIT (OUTPATIENT)
Dept: URGENT CARE | Facility: URGENT CARE | Age: 51
End: 2021-11-23
Payer: COMMERCIAL

## 2021-11-23 ENCOUNTER — ANCILLARY PROCEDURE (OUTPATIENT)
Dept: GENERAL RADIOLOGY | Facility: CLINIC | Age: 51
End: 2021-11-23
Attending: PHYSICIAN ASSISTANT
Payer: COMMERCIAL

## 2021-11-23 ENCOUNTER — MYC MEDICAL ADVICE (OUTPATIENT)
Dept: FAMILY MEDICINE | Facility: CLINIC | Age: 51
End: 2021-11-23
Payer: COMMERCIAL

## 2021-11-23 VITALS
RESPIRATION RATE: 18 BRPM | DIASTOLIC BLOOD PRESSURE: 77 MMHG | TEMPERATURE: 98.5 F | SYSTOLIC BLOOD PRESSURE: 131 MMHG | OXYGEN SATURATION: 100 % | HEART RATE: 72 BPM

## 2021-11-23 DIAGNOSIS — R00.2 PALPITATIONS: Primary | ICD-10-CM

## 2021-11-23 DIAGNOSIS — I45.10 INCOMPLETE RIGHT BUNDLE BRANCH BLOCK: ICD-10-CM

## 2021-11-23 LAB
ALBUMIN SERPL-MCNC: 4.4 G/DL (ref 3.4–5)
ALP SERPL-CCNC: 60 U/L (ref 40–150)
ALT SERPL W P-5'-P-CCNC: 21 U/L (ref 0–50)
ANION GAP SERPL CALCULATED.3IONS-SCNC: 6 MMOL/L (ref 3–14)
AST SERPL W P-5'-P-CCNC: 17 U/L (ref 0–45)
BASOPHILS # BLD AUTO: 0.1 10E3/UL (ref 0–0.2)
BASOPHILS NFR BLD AUTO: 1 %
BILIRUB SERPL-MCNC: 0.6 MG/DL (ref 0.2–1.3)
BUN SERPL-MCNC: 24 MG/DL (ref 7–30)
CALCIUM SERPL-MCNC: 9.7 MG/DL (ref 8.5–10.1)
CHLORIDE BLD-SCNC: 104 MMOL/L (ref 94–109)
CO2 SERPL-SCNC: 28 MMOL/L (ref 20–32)
CREAT SERPL-MCNC: 0.82 MG/DL (ref 0.52–1.04)
EOSINOPHIL # BLD AUTO: 0.1 10E3/UL (ref 0–0.7)
EOSINOPHIL NFR BLD AUTO: 2 %
ERYTHROCYTE [DISTWIDTH] IN BLOOD BY AUTOMATED COUNT: 12.6 % (ref 10–15)
GFR SERPL CREATININE-BSD FRML MDRD: 83 ML/MIN/1.73M2
GLUCOSE BLD-MCNC: 92 MG/DL (ref 70–99)
HCT VFR BLD AUTO: 39.3 % (ref 35–47)
HGB BLD-MCNC: 13 G/DL (ref 11.7–15.7)
LYMPHOCYTES # BLD AUTO: 2.2 10E3/UL (ref 0.8–5.3)
LYMPHOCYTES NFR BLD AUTO: 37 %
MCH RBC QN AUTO: 29.7 PG (ref 26.5–33)
MCHC RBC AUTO-ENTMCNC: 33.1 G/DL (ref 31.5–36.5)
MCV RBC AUTO: 90 FL (ref 78–100)
MONOCYTES # BLD AUTO: 0.3 10E3/UL (ref 0–1.3)
MONOCYTES NFR BLD AUTO: 5 %
NEUTROPHILS # BLD AUTO: 3.2 10E3/UL (ref 1.6–8.3)
NEUTROPHILS NFR BLD AUTO: 55 %
PLATELET # BLD AUTO: 196 10E3/UL (ref 150–450)
POTASSIUM BLD-SCNC: 3.8 MMOL/L (ref 3.4–5.3)
PROT SERPL-MCNC: 7.6 G/DL (ref 6.8–8.8)
RBC # BLD AUTO: 4.37 10E6/UL (ref 3.8–5.2)
SODIUM SERPL-SCNC: 138 MMOL/L (ref 133–144)
WBC # BLD AUTO: 5.9 10E3/UL (ref 4–11)

## 2021-11-23 PROCEDURE — 36415 COLL VENOUS BLD VENIPUNCTURE: CPT | Performed by: PHYSICIAN ASSISTANT

## 2021-11-23 PROCEDURE — 85025 COMPLETE CBC W/AUTO DIFF WBC: CPT | Performed by: PHYSICIAN ASSISTANT

## 2021-11-23 PROCEDURE — 71046 X-RAY EXAM CHEST 2 VIEWS: CPT | Performed by: RADIOLOGY

## 2021-11-23 PROCEDURE — 93000 ELECTROCARDIOGRAM COMPLETE: CPT | Performed by: PHYSICIAN ASSISTANT

## 2021-11-23 PROCEDURE — 80053 COMPREHEN METABOLIC PANEL: CPT | Performed by: PHYSICIAN ASSISTANT

## 2021-11-23 PROCEDURE — 99214 OFFICE O/P EST MOD 30 MIN: CPT | Performed by: PHYSICIAN ASSISTANT

## 2021-11-23 ASSESSMENT — ENCOUNTER SYMPTOMS
PALPITATIONS: 1
HEADACHES: 0
SHORTNESS OF BREATH: 0

## 2021-11-23 ASSESSMENT — PAIN SCALES - GENERAL: PAINLEVEL: NO PAIN (0)

## 2021-11-23 NOTE — PROGRESS NOTES
"SUBJECTIVE:   An Cornejo is a 51 year old female presenting with a chief complaint of   Chief Complaint   Patient presents with     Palpitations     week and half ago patient was making the bed and she had sudden palpitations- then favio't into a normal pulse after about a minute- also, patient has asthma- she has been feeling a wheezing in her lungs at times- patient hasn't been having any asthma attacks. Mother has congestive heart failure.     Hypertension     Patient had is wondering about blood pressure- saturday it was reading 96/60- today is 131/77       She is an established patient of [facility].  Patient presents with complaints of palpitations x 1 for approximately 1 time.      PMHx:  Asthma  Medications:  None; estrogen  Allergies:  Reviewed  Surgeries:  Lumpectomy, partial mastectomy, implants and revisions.    Social:  etoh social.  Never smoked.  Family:  Mom CHF - alive    Review of Systems   Respiratory: Negative for shortness of breath.    Cardiovascular: Positive for palpitations and leg swelling.   Neurological: Negative for headaches.   All other systems reviewed and are negative.      Past Medical History:   Diagnosis Date     Abnormal Pap smear of cervix 2016, 2017, 12/18/18    See problem list     Anxiety     Use medication as needed     Asthma     Uses inhaler as needed     Cervical high risk HPV (human papillomavirus) test positive 2016 2017, 2018, 2020, 2021     Episodic tension type headache     Started when she was in her 30's intermittent     IUD     Mirena inserted 6/5/13     Moderate dysplasia of cervix 2019    see problem list     Neck pain     Both side of neck     Seizures (H) infancy    \"grew out of it\"     Status post colposcopy 11/28/2016    ECC - negative     Upper back pain     Over 5 years or more     Family History   Problem Relation Age of Onset     Depression Mother      Hypertension Mother      Lipids Mother      Arthritis Mother      Asthma Mother      Pulmonary " Embolism Mother      Breast Cancer Mother      Breast Cancer Maternal Grandmother         postmenopausal     Cancer Maternal Grandmother      Respiratory Paternal Grandfather         emphysema     Hypertension Father      Cancer Sister         Thyroid     Neurologic Disorder Sister         MS     Current Outpatient Medications   Medication Sig Dispense Refill     Estradiol 10 MCG INST Place 10 mcg vaginally twice a week 24 each 1     ADVAIR DISKUS 100-50 MCG/DOSE inhaler INHALE 1 PUFF INTO THE LUNGS EVERY 12 HOURS (Patient not taking: Reported on 11/23/2021) 1 Inhaler 0     albuterol (PROAIR HFA/PROVENTIL HFA/VENTOLIN HFA) 108 (90 Base) MCG/ACT inhaler Inhale 2 puffs into the lungs every 4 hours as needed for shortness of breath / dyspnea (Patient not taking: Reported on 11/23/2021) 18 g 3     ALPRAZolam (XANAX) 0.5 MG tablet Take 1 tablet (0.5 mg) by mouth nightly as needed for sleep (Patient not taking: Reported on 11/23/2021) 90 tablet 1     Lubricants (ASTROGLIDE) GEL Externally apply topically nightly as needed (prn) 1 Tube 1     rizatriptan (MAXALT) 10 MG tablet Take 1 tablet (10 mg) by mouth at onset of headache for migraine May repeat in 2 hours. Max 3 tablets/24 hours. (Patient not taking: Reported on 11/23/2021) 18 tablet 1     Social History     Tobacco Use     Smoking status: Never Smoker     Smokeless tobacco: Never Used     Tobacco comment: nonsmoking household   Substance Use Topics     Alcohol use: Yes     Alcohol/week: 0.0 standard drinks     Comment: 2 glasses of gin and tonic may be once per month       OBJECTIVE  /77   Pulse 72   Temp 98.5  F (36.9  C) (Tympanic)   Resp 18   LMP  (LMP Unknown)   SpO2 100%     Physical Exam  Vitals and nursing note reviewed.   Constitutional:       Appearance: Normal appearance. She is normal weight.   Eyes:      Extraocular Movements: Extraocular movements intact.      Conjunctiva/sclera: Conjunctivae normal.   Neck:      Vascular: No carotid bruit.    Cardiovascular:      Rate and Rhythm: Normal rate and regular rhythm.      Pulses: Normal pulses.      Heart sounds: Normal heart sounds.   Pulmonary:      Effort: Pulmonary effort is normal.      Breath sounds: Normal breath sounds.      Comments: No pitting edema.  Skin:     General: Skin is warm and dry.   Neurological:      General: No focal deficit present.      Mental Status: She is alert.   Psychiatric:         Mood and Affect: Mood normal.         Behavior: Behavior normal.         Labs:  Results for orders placed or performed in visit on 11/23/21 (from the past 24 hour(s))   CBC with platelets and differential    Narrative    The following orders were created for panel order CBC with platelets and differential.  Procedure                               Abnormality         Status                     ---------                               -----------         ------                     CBC with platelets and d...[[id number]]                                                   Please view results for these tests on the individual orders.       X-Ray was done, my findings are: pending.  EKG:  RBBB-incomplete, HR 66.    ASSESSMENT:      ICD-10-CM    1. Palpitations  R00.2 EKG 12-lead complete w/read - Clinics     XR Chest 2 Views     CBC with platelets and differential     Comprehensive metabolic panel (BMP + Alb, Alk Phos, ALT, AST, Total. Bili, TP)     Basic metabolic panel  (Ca, Cl, CO2, Creat, Gluc, K, Na, BUN)     Adult Cardiology Eval Referral     BNP-N terminal pro     CBC with platelets and differential     Comprehensive metabolic panel (BMP + Alb, Alk Phos, ALT, AST, Total. Bili, TP)     CANCELED: Basic metabolic panel  (Ca, Cl, CO2, Creat, Gluc, K, Na, BUN)        Medical Decision Making:    Differential Diagnosis:  Cardiac: Palpitations  RBBB    Serious Comorbid Conditions:  Adult:  Asthma    PLAN:    CBC, cmp, bnp, cxr pending.  Cardiology referral.  Patient education.  Discussed reasons to seek  immediate medical attention.  Will call with any abnormal labs.  Spent 34 minutes reviewing chart, discussing symptoms with patient and patient education regarding possible causes and electricity of heart.        Followup:    If not improving or if condition worsens, follow up with your Primary Care Provider, In 7  day(s) follow up with  Cardiology    Patient Instructions     Patient Education     Understanding Heart Palpitations    Heart palpitations are the feeling you have when your heartbeat seems to be racing, pounding, skipping, or fluttering. Heart palpitations are most often felt in the chest. Sometimes, they may also be felt in the neck.  What causes heart palpitations?  In most cases, heart palpitations are caused by:    Stress or anxiety    Exercise    Pregnancy    Some medicines    Caffeine    Nicotine    Alcohol    Illegal drugs, such as cocaine    Health problems, such as anemia or overactive thyroid  Many heart palpitations are harmless. But in some cases, palpitations may be caused by a problem with the heart such as an abnormal heart rhythm (arrhythmia). They may need to be managed by you and your healthcare provider or treated right away.  How are heart palpitations treated?  Treatments for heart palpitations depend on the cause. Options may include:    Managing the things that trigger your heart palpitations. This could mean:  ? Learning ways to reduce stress and anxiety  ? Staying away from caffeine, nicotine, alcohol, and illegal drugs  ? Stopping the use of certain medicines, under your doctor s guidance    Medicines, procedures, or surgery to treat an arrhythmia or other health problem that is causing your symptoms  What are possible complications of heart palpitations?  Complications of heart palpitations are rare unless they are caused by a problem such as an arrhythmia. In such cases, complications can include:    Fainting    Heart failure. This problem occurs when the heart is so weak it  no longer pumps blood well.    Blood clots and stroke    Sudden cardiac arrest. This problem occurs when the heart suddenly stops beating.  When should I call my healthcare provider?  Call your healthcare provider right away if you have any of these:    Palpitations that prevent you from sleeping or otherwise affect your quality of life.    Symptoms that don t get better with treatment, or symptoms that get worse    New symptoms, such as chest pain, shortness of breath, dizziness, or fainting  [employer] last reviewed this educational content on 6/1/2019 2000-2021 [employer]. All rights reserved. This information is not intended as a substitute for professional medical care. Always follow your healthcare professional's instructions.           Patient Education     Your Heart s Electrical System  The heartbeat is the rhythmic squeezing (contraction) of the heart muscle's 4 chambers. The 4 chambers are called the right atrium, the right ventricle, the left atrium, and the left ventricle. The heart muscle has a system that creates and sends electrical signals to trigger the action of these 4 chambers. First, an electrical signal is made in the right upper chamber of the heart. This signal tells the 2 upper chambers (atria) to squeeze. This moves blood to the 2 lower chambers (ventricles). Next, the signals tell the ventricles to squeeze. This moves blood to the lungs, brain, heart, and body.     Electrical signals  Groups of specialized cells in the right atrium send out the heart s electrical signals. These groups of cells are called nodes. The signals travel along pathways. In the ventricles, these pathways are called bundle branches.   The SA (sinoatrial) node  The SA usually sets the pace of the heartbeat. Each heartbeat is normally evenly spaced from beat to beat. It starts each beat by releasing an electrical signal. This signal tells the atria to squeeze. The resting rate for this is between 60  "and 100 beats per minute.   The AV (atrioventricular) node  This group of specialized cells gets the electrical signals from the atria. It is like a \"\" between the atria and the ventricles. The AV node sends the signals into the ventricles after a delay. This lets the atria contract before the ventricles contract. If the sinus node fails to fire, the AV node may also kick in as a backup. It has a rate of 40 to60 beats per minute.   The bundle branches   These branches carry the electrical signals through the ventricle walls. This causes the ventricles to squeeze.   [employer] last reviewed this educational content on 7/1/2019 2000-2021 [employer]. All rights reserved. This information is not intended as a substitute for professional medical care. Always follow your healthcare professional's instructions.                 "

## 2021-11-23 NOTE — TELEPHONE ENCOUNTER
Patient now calling about the below message.   I reiterated to her that if she is feeling an abnormal heart rhythm with symptoms like shortness of breath, dizziness, etc, she needs to be seen in the ER.   If she is feeing an abnormal heart rhythm with no symptoms, she can come to urgent care.  Advised patient to come into urgent care today to be evaluated.     Alba ROCHAN, RN

## 2021-11-23 NOTE — PATIENT INSTRUCTIONS
Patient Education     Understanding Heart Palpitations    Heart palpitations are the feeling you have when your heartbeat seems to be racing, pounding, skipping, or fluttering. Heart palpitations are most often felt in the chest. Sometimes, they may also be felt in the neck.  What causes heart palpitations?  In most cases, heart palpitations are caused by:    Stress or anxiety    Exercise    Pregnancy    Some medicines    Caffeine    Nicotine    Alcohol    Illegal drugs, such as cocaine    Health problems, such as anemia or overactive thyroid  Many heart palpitations are harmless. But in some cases, palpitations may be caused by a problem with the heart such as an abnormal heart rhythm (arrhythmia). They may need to be managed by you and your healthcare provider or treated right away.  How are heart palpitations treated?  Treatments for heart palpitations depend on the cause. Options may include:    Managing the things that trigger your heart palpitations. This could mean:  ? Learning ways to reduce stress and anxiety  ? Staying away from caffeine, nicotine, alcohol, and illegal drugs  ? Stopping the use of certain medicines, under your doctor s guidance    Medicines, procedures, or surgery to treat an arrhythmia or other health problem that is causing your symptoms  What are possible complications of heart palpitations?  Complications of heart palpitations are rare unless they are caused by a problem such as an arrhythmia. In such cases, complications can include:    Fainting    Heart failure. This problem occurs when the heart is so weak it no longer pumps blood well.    Blood clots and stroke    Sudden cardiac arrest. This problem occurs when the heart suddenly stops beating.  When should I call my healthcare provider?  Call your healthcare provider right away if you have any of these:    Palpitations that prevent you from sleeping or otherwise affect your quality of life.    Symptoms that don t get better with  "treatment, or symptoms that get worse    New symptoms, such as chest pain, shortness of breath, dizziness, or fainting  [employer] last reviewed this educational content on 6/1/2019 2000-2021 [employer]. All rights reserved. This information is not intended as a substitute for professional medical care. Always follow your healthcare professional's instructions.           Patient Education     Your Heart s Electrical System  The heartbeat is the rhythmic squeezing (contraction) of the heart muscle's 4 chambers. The 4 chambers are called the right atrium, the right ventricle, the left atrium, and the left ventricle. The heart muscle has a system that creates and sends electrical signals to trigger the action of these 4 chambers. First, an electrical signal is made in the right upper chamber of the heart. This signal tells the 2 upper chambers (atria) to squeeze. This moves blood to the 2 lower chambers (ventricles). Next, the signals tell the ventricles to squeeze. This moves blood to the lungs, brain, heart, and body.     Electrical signals  Groups of specialized cells in the right atrium send out the heart s electrical signals. These groups of cells are called nodes. The signals travel along pathways. In the ventricles, these pathways are called bundle branches.   The SA (sinoatrial) node  The SA usually sets the pace of the heartbeat. Each heartbeat is normally evenly spaced from beat to beat. It starts each beat by releasing an electrical signal. This signal tells the atria to squeeze. The resting rate for this is between 60 and 100 beats per minute.   The AV (atrioventricular) node  This group of specialized cells gets the electrical signals from the atria. It is like a \"\" between the atria and the ventricles. The AV node sends the signals into the ventricles after a delay. This lets the atria contract before the ventricles contract. If the sinus node fails to fire, the AV node may also " kick in as a backup. It has a rate of 40 to60 beats per minute.   The bundle branches   These branches carry the electrical signals through the ventricle walls. This causes the ventricles to squeeze.   Ramon last reviewed this educational content on 7/1/2019 2000-2021 [employer]. All rights reserved. This information is not intended as a substitute for professional medical care. Always follow your healthcare professional's instructions.

## 2021-11-29 ENCOUNTER — TRANSFERRED RECORDS (OUTPATIENT)
Dept: HEALTH INFORMATION MANAGEMENT | Facility: CLINIC | Age: 51
End: 2021-11-29
Payer: COMMERCIAL

## 2021-12-02 NOTE — TELEPHONE ENCOUNTER
RECORDS RECEIVED FROM:   DATE RECEIVED:   NOTES STATUS DETAILS   OFFICE NOTE from referring provider    Internal UC Dr. Green [phone]   OFFICE NOTE from other cardiologist    N/A    DISCHARGE SUMMARY from hospital    N/A    DISCHARGE REPORT from the ER   Care Everywhere 11-29-21 [facility]   OPERATIVE REPORT    N/A    MEDICATION LIST   Internal    LABS     BMP   Care Everywhere 11-29-21   CBC   Care Everywhere 11-29-21   CMP   Internal 11-23-21   Lipids   N/A    TSH   Internal 3-2-20   DIAGNOSTIC PROCEDURES     EKG   In process 11-29-21   Monitor Reports   N/A    IMAGING (DISC & REPORT)      Echo   N/A    Stress Tests   N/A    Cath   N/A    MRI/MRA   N/A    CT/CTA   N/A      Action 12/2/21 [facility]   Action Taken Requested EKG 11-29-21 from Abhijit    Sent second request to Abhijit 12/13    EKG given to Dr. Aviles 12/14

## 2021-12-14 ENCOUNTER — PRE VISIT (OUTPATIENT)
Dept: CARDIOLOGY | Facility: CLINIC | Age: 51
End: 2021-12-14
Payer: COMMERCIAL

## 2021-12-14 ENCOUNTER — ANCILLARY PROCEDURE (OUTPATIENT)
Dept: CARDIOLOGY | Facility: CLINIC | Age: 51
End: 2021-12-14
Attending: INTERNAL MEDICINE
Payer: COMMERCIAL

## 2021-12-14 ENCOUNTER — OFFICE VISIT (OUTPATIENT)
Dept: CARDIOLOGY | Facility: CLINIC | Age: 51
End: 2021-12-14
Attending: PHYSICIAN ASSISTANT
Payer: COMMERCIAL

## 2021-12-14 VITALS
WEIGHT: 139.1 LBS | OXYGEN SATURATION: 98 % | BODY MASS INDEX: 21.83 KG/M2 | DIASTOLIC BLOOD PRESSURE: 72 MMHG | HEART RATE: 72 BPM | SYSTOLIC BLOOD PRESSURE: 105 MMHG | HEIGHT: 67 IN

## 2021-12-14 DIAGNOSIS — R00.2 PALPITATIONS: ICD-10-CM

## 2021-12-14 DIAGNOSIS — R07.89 CHEST HEAVINESS: Primary | ICD-10-CM

## 2021-12-14 DIAGNOSIS — R07.89 CHEST HEAVINESS: ICD-10-CM

## 2021-12-14 PROCEDURE — 93248 EXT ECG>7D<15D REV&INTERPJ: CPT | Performed by: INTERNAL MEDICINE

## 2021-12-14 PROCEDURE — 93246 EXT ECG>7D<15D RECORDING: CPT

## 2021-12-14 PROCEDURE — G0463 HOSPITAL OUTPT CLINIC VISIT: HCPCS

## 2021-12-14 PROCEDURE — 99204 OFFICE O/P NEW MOD 45 MIN: CPT | Mod: 25 | Performed by: INTERNAL MEDICINE

## 2021-12-14 ASSESSMENT — PAIN SCALES - GENERAL: PAINLEVEL: NO PAIN (0)

## 2021-12-14 ASSESSMENT — MIFFLIN-ST. JEOR: SCORE: 1273.58

## 2021-12-14 NOTE — PATIENT INSTRUCTIONS
Complete an exercise stress echocardiogram.    Cardiac monitor placed today. Please mail as soon as completed. Results available approximately 10-14 days from mailing date.    As soon as results are compiled and reviewed, you will be notified.

## 2021-12-14 NOTE — LETTER
12/14/2021      RE: An Cornejo  [address]  [facility] [place] [address]       Dear Colleague,    Thank you for the opportunity to participate in the care of your patient, An Cornejo, at the [facility] [place] [facility]. Please see a copy of my visit note below.       SUBJECTIVE:  An Cornejo is a 51 year old female who presents for evaluation of palpitations and chest heaviness.  On 29th of last month patient had an episode of palpitations.  Very fast heartbeat lasting around 2 minutes.  According the patient an extra beat terminated the palpitations.  No associated symptoms.  Subsided by itself.  Patient was sent to urgent care and evaluated.  No abnormal findings on evaluation at urgent care.  Since then there is no recurrence.  No prior history of palpitation.  Patient will get intermittent chest tightness.  This is associated with some shortness of breath.  No diaphoresis nausea vomiting.  No history of excess alcohol or caffeinated drinks.  Coffee does give her skipped beats.  Patient has no significant CAD risk factors.  No diabetes hypertension.   with HDL of 80.  No family history of arrhythmia or premature coronary artery disease.      Patient Active Problem List    Diagnosis Date Noted     S/P LEEP 05/11/2021     Priority: Medium     Other irritable bowel syndrome 04/15/2021     Priority: Medium     Cervical high risk HPV (human papillomavirus) test positive 10/31/2016     Priority: Medium     10/31/16: [facility] Pap, + HR HPV 16 & other HR HPV. Plan colp  11/28/16: [place] ECC - benign. Plan cotest in 1 year.   12/6/17 [facility] Pap, + HR HPV 16 & other HR HPV. Plan colp  1/17/18 [place] no bx- Normal exam without visible pathology    Plan: Cotest in 1yr due by 12/6/18 12/18/18 [facility] pap, + HR HPV 16 and + HR HPV other. Plan colp.   1/7/19 [place] Bx - BARB 2, ECC - HSIL. Plan LEEP  1/14/19 LEEP Bx - Negative, ECC - Non-diagnostic. Plan  cotest in 1 year.   3/2/20 [facility] Pap, + HR HPV 16 (neg 18 & other). Plan colp  5/11/20 [place] visually normal; no biopsies taken. Cotest due 3/2/21.   4/7/21 NIL pap, + HR HPV 16 (neg 18 & other). [place] due by 7/7/21.   6/7/21 [place] Bx's - no BARB. Plan: cotest in 1 year, due 6/7/2022.   6/11/21 Pt notified by phone.        Rectocele 03/13/2014     Priority: Medium     KATIE (stress urinary incontinence, female) 03/13/2014     Priority: Medium     Asthma, exercise induced 04/05/2012     Priority: Medium     Exposure to pertussis 04/05/2012     Priority: Medium     Mild persistent asthma 10/06/2011     Priority: Medium     Anxiety 02/14/2011     Priority: Medium     CARDIOVASCULAR SCREENING; LDL GOAL LESS THAN 160 01/20/2011     Priority: Medium    .  Current Outpatient Medications   Medication Sig     Estradiol 10 MCG INST Place 10 mcg vaginally twice a week     ADVAIR DISKUS 100-50 MCG/DOSE inhaler INHALE 1 PUFF INTO THE LUNGS EVERY 12 HOURS (Patient not taking: Reported on 11/23/2021)     albuterol (PROAIR HFA/PROVENTIL HFA/VENTOLIN HFA) 108 (90 Base) MCG/ACT inhaler Inhale 2 puffs into the lungs every 4 hours as needed for shortness of breath / dyspnea (Patient not taking: Reported on 11/23/2021)     ALPRAZolam (XANAX) 0.5 MG tablet Take 1 tablet (0.5 mg) by mouth nightly as needed for sleep (Patient not taking: Reported on 11/23/2021)     Lubricants (ASTROGLIDE) GEL Externally apply topically nightly as needed (prn) (Patient not taking: Reported on 12/14/2021)     rizatriptan (MAXALT) 10 MG tablet Take 1 tablet (10 mg) by mouth at onset of headache for migraine May repeat in 2 hours. Max 3 tablets/24 hours. (Patient not taking: Reported on 11/23/2021)     No current facility-administered medications for this visit.     Past Medical History:   Diagnosis Date     Abnormal Pap smear of cervix 2016, 2017, 12/18/18    See problem list     Anxiety     Use medication as needed     Asthma     Uses inhaler as needed     Cervical  "high risk HPV (human papillomavirus) test positive 2016 2017, 2018, 2020, 2021     Episodic tension type headache     Started when she was in her 30's intermittent     IUD     Mirena inserted 6/5/13     Moderate dysplasia of cervix 2019    see problem list     Neck pain     Both side of neck     Seizures (H) infancy    \"grew out of it\"     Status post colposcopy 11/28/2016    ECC - negative     Upper back pain     Over 5 years or more     Past Surgical History:   Procedure Laterality Date     BIOPSY  2001     BREAST LUMPECTOMY, RT/LT Left     Benign lesion as per patient     COLONOSCOPY  2017     COLPORRHAPHY POSTERIOR N/A 10/21/2014    Procedure: COLPORRHAPHY POSTERIOR;  Surgeon: Fabian Harding MD;  Location: [facility]     CYSTOSCOPY, SLING TRANSVAGINAL N/A 10/21/2014    Procedure: CYSTOSCOPY, SLING TRANSVAGINAL;  Surgeon: Fabian Harding MD;  Location: [facility]     EXTRACTION(S) DENTAL       PERINEORRHAPHY N/A 10/21/2014    Procedure: PERINEORRHAPHY;  Surgeon: Fabian Harding MD;  Location: [facility]     RECONSTRUCT BREAST BILATERAL       Allergies   Allergen Reactions     Pain Relieving      Unable to urinate/have BM     Hydrocodone      Social History     Socioeconomic History     Marital status:      Spouse name: Not on file     Number of children: 2     Years of education: 16     Highest education level: Not on file   Occupational History     Occupation: Writer     Comment: [employer]   Tobacco Use     Smoking status: Never Smoker     Smokeless tobacco: Never Used     Tobacco comment: nonsmoking household   Substance and Sexual Activity     Alcohol use: Yes     Alcohol/week: 0.0 standard drinks     Comment: 2 glasses of gin and tonic may be once per month     Drug use: Never     Sexual activity: Yes     Partners: Male     Birth control/protection: Post-menopausal   Other Topics Concern     Parent/sibling w/ CABG, MI or angioplasty before 65F 55M? No   Social History Narrative     Not on " "file     Social Determinants of Health     Financial Resource Strain: Not on file   Food Insecurity: Not on file   Transportation Needs: Not on file   Physical Activity: Not on file   Stress: Not on file   Social Connections: Not on file   Intimate Partner Violence: Not on file   Housing Stability: Not on file     Family History   Problem Relation Age of Onset     Depression Mother      Hypertension Mother      Lipids Mother      Arthritis Mother      Asthma Mother      Pulmonary Embolism Mother      Breast Cancer Mother      Breast Cancer Maternal Grandmother         postmenopausal     Cancer Maternal Grandmother      Respiratory Paternal Grandfather         emphysema     Hypertension Father      Cancer Sister         Thyroid     Neurologic Disorder Sister         MS          REVIEW OF SYSTEMS:  General: negative, fever, chills, night sweats  Skin: negative, acne, rash and scaling  Eyes: negative, double vision, eye pain and photophobia  Ears/Nose/Throat: negative, nasal congestion and purulent rhinorrhea  Respiratory: No dyspnea on exertion, No cough, No hemoptysis and negative  Cardiovascular: negative, chest pain, exertional chest pain or pressure, paroxysmal nocturnal dyspnea, dyspnea on exertion and orthopnea         OBJECTIVE:  Blood pressure 105/72, pulse 72, height 1.702 m (5' 7\"), weight 63.1 kg (139 lb 1.6 oz), SpO2 98 %, not currently breastfeeding.  General Appearance: healthy, alert, active and no distress  Head: Normocephalic. No masses, lesions, tenderness or abnormalities  Eyes: conjuctiva clear, PERRL, EOM intact  Ears: External ears normal. Canals clear. TM's normal.  Nose: Nares normal  Mouth: normal  Neck: Supple, no cervical adenopathy, no thyromegaly  Lungs: clear to auscultation  Cardiac: regular rate and rhythm, normal S1 and S2, no murmur       ASSESSMENT/PLAN:  Patient here for evaluation of palpitations and chest heaviness.  One episode of tachycardia lasting 2 minutes with no associated " symptoms.  No prior history of arrhythmia.  No significant risk factors.  Fairly active with no cardiac symptoms.  Reviewed EKG normal sinus rhythm.  Normal EKG.  EKG from outside hospital reviewed.  Which showed normal sinus rhythm.  Sinus bradycardia otherwise normal  Discussed the fact that needed tracing of the rhythm during palpitation to diagnose and manage.  As her symptoms are very infrequent it may be difficult to obtain the tracing.  Will plan for an exercise stress echo to assess the intermittent chest tightness and a 2-week Zio patch.  If there is any recurrence of her symptoms we will plan further evaluation.  Patient will be contacted with the test result.  Per orders.   Return to Clinic as needed.  Total visit duration 45 minutes.  This includes face-to-face interview, physical exam, chart review, review of EKGs, care everywhere and documentation.      Please do not hesitate to contact me if you have any questions/concerns.     Sincerely,     TIMUR Nava MD

## 2021-12-14 NOTE — PROGRESS NOTES
"Per Dr. Aviles, patient to have Zio monitor placed.  Diagnosis: palpitations  Monitor placed: {YES / NO:381114::\"Yes\"}  Patient Instructed: {YES / NO:874395::\"Yes\"}  Patient verbalized understanding: {YES / NO:492667::\"Yes\"}  Holter # [id number]        "

## 2021-12-14 NOTE — PROGRESS NOTES
SUBJECTIVE:  An Cornejo is a 51 year old female who presents for evaluation of palpitations and chest heaviness.  On 29th of last month patient had an episode of palpitations.  Very fast heartbeat lasting around 2 minutes.  According the patient an extra beat terminated the palpitations.  No associated symptoms.  Subsided by itself.  Patient was sent to urgent care and evaluated.  No abnormal findings on evaluation at urgent care.  Since then there is no recurrence.  No prior history of palpitation.  Patient will get intermittent chest tightness.  This is associated with some shortness of breath.  No diaphoresis nausea vomiting.  No history of excess alcohol or caffeinated drinks.  Coffee does give her skipped beats.  Patient has no significant CAD risk factors.  No diabetes hypertension.   with HDL of 80.  No family history of arrhythmia or premature coronary artery disease.      Patient Active Problem List    Diagnosis Date Noted     S/P LEEP 05/11/2021     Priority: Medium     Other irritable bowel syndrome 04/15/2021     Priority: Medium     Cervical high risk HPV (human papillomavirus) test positive 10/31/2016     Priority: Medium     10/31/16: [facility] Pap, + HR HPV 16 & other HR HPV. Plan colp  11/28/16: [place] ECC - benign. Plan cotest in 1 year.   12/6/17 [facility] Pap, + HR HPV 16 & other HR HPV. Plan colp  1/17/18 [place] no bx- Normal exam without visible pathology    Plan: Cotest in 1yr due by 12/6/18 12/18/18 [facility] pap, + HR HPV 16 and + HR HPV other. Plan colp.   1/7/19 [place] Bx - BARB 2, ECC - HSIL. Plan LEEP  1/14/19 L[place] Bx - Negative, ECC - Non-diagnostic. Plan cotest in 1 year.   3/2/20 NIL Pap, + HR HPV 16 (neg 18 & other). Plan colp  5/11/20 [place] visually normal; no biopsies taken. Cotest due 3/2/21.   4/7/21 NIL pap, + HR HPV 16 (neg 18 & other). [place] due by 7/7/21.   6/7/21 [place] Bx's - no BARB. Plan: cotest in 1 year, due 6/7/2022.   6/11/21 Pt notified by phone.        Rectocele 03/13/2014      "Priority: Medium     KATIE (stress urinary incontinence, female) 03/13/2014     Priority: Medium     Asthma, exercise induced 04/05/2012     Priority: Medium     Exposure to pertussis 04/05/2012     Priority: Medium     Mild persistent asthma 10/06/2011     Priority: Medium     Anxiety 02/14/2011     Priority: Medium     CARDIOVASCULAR SCREENING; LDL GOAL LESS THAN 160 01/20/2011     Priority: Medium    .  Current Outpatient Medications   Medication Sig     Estradiol 10 MCG INST Place 10 mcg vaginally twice a week     ADVAIR DISKUS 100-50 MCG/DOSE inhaler INHALE 1 PUFF INTO THE LUNGS EVERY 12 HOURS (Patient not taking: Reported on 11/23/2021)     albuterol (PROAIR HFA/PROVENTIL HFA/VENTOLIN HFA) 108 (90 Base) MCG/ACT inhaler Inhale 2 puffs into the lungs every 4 hours as needed for shortness of breath / dyspnea (Patient not taking: Reported on 11/23/2021)     ALPRAZolam (XANAX) 0.5 MG tablet Take 1 tablet (0.5 mg) by mouth nightly as needed for sleep (Patient not taking: Reported on 11/23/2021)     Lubricants (ASTROGLIDE) GEL Externally apply topically nightly as needed (prn) (Patient not taking: Reported on 12/14/2021)     rizatriptan (MAXALT) 10 MG tablet Take 1 tablet (10 mg) by mouth at onset of headache for migraine May repeat in 2 hours. Max 3 tablets/24 hours. (Patient not taking: Reported on 11/23/2021)     No current facility-administered medications for this visit.     Past Medical History:   Diagnosis Date     Abnormal Pap smear of cervix 2016, 2017, 12/18/18    See problem list     Anxiety     Use medication as needed     Asthma     Uses inhaler as needed     Cervical high risk HPV (human papillomavirus) test positive 2016 2017, 2018, 2020, 2021     Episodic tension type headache     Started when she was in her 30's intermittent     IUD     Mirena inserted 6/5/13     Moderate dysplasia of cervix 2019    see problem list     Neck pain     Both side of neck     Seizures (H) infancy    \"grew out of it\"     " Status post colposcopy 11/28/2016    ECC - negative     Upper back pain     Over 5 years or more     Past Surgical History:   Procedure Laterality Date     BIOPSY  2001     BREAST LUMPECTOMY, RT/LT Left     Benign lesion as per patient     COLONOSCOPY  2017     COLPORRHAPHY POSTERIOR N/A 10/21/2014    Procedure: COLPORRHAPHY POSTERIOR;  Surgeon: Fabian Harding MD;  [facility]     CYSTOSCOPY, SLING TRANSVAGINAL N/A 10/21/2014    Procedure: CYSTOSCOPY, SLING TRANSVAGINAL;  Surgeon: Fabian Harding MD;  [facility]     EXTRACTION(S) DENTAL       PERINEORRHAPHY N/A 10/21/2014    Procedure: PERINEORRHAPHY;  Surgeon: Fabian Harding MD;  [facility]     RECONSTRUCT BREAST BILATERAL       Allergies   Allergen Reactions     Pain Relieving      Unable to urinate/have BM     Hydrocodone      Social History     Socioeconomic History     Marital status:      Spouse name: Not on file     Number of children: 2     Years of education: 16     Highest education level: Not on file   Occupational History     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 [employer]   Tobacco Use     Smoking status: Never Smoker     Smokeless tobacco: Never Used     Tobacco comment: nonsmoking household   Substance and Sexual Activity     Alcohol use: Yes     Alcohol/week: 0.0 standard drinks     Comment: 2 glasses of gin and tonic may be once per month     Drug use: Never     Sexual activity: Yes     Partners: Male     Birth control/protection: Post-menopausal   Other Topics Concern     Parent/sibling w/ CABG, MI or angioplasty before 65F 55M? No   Social History Narrative     Not on file     Social Determinants of Health     Financial Resource Strain: Not on file   Food Insecurity: Not on file   Transportation Needs: Not on file   Physical Activity: Not on file   Stress: Not on file   Social Connections: Not on file   Intimate Partner Violence: Not on file   Housing Stability: Not on file     Family History   Problem  "Relation Age of Onset     Depression Mother      Hypertension Mother      Lipids Mother      Arthritis Mother      Asthma Mother      Pulmonary Embolism Mother      Breast Cancer Mother      Breast Cancer Maternal Grandmother         postmenopausal     Cancer Maternal Grandmother      Respiratory Paternal Grandfather         emphysema     Hypertension Father      Cancer Sister         Thyroid     Neurologic Disorder Sister         MS          REVIEW OF SYSTEMS:  General: negative, fever, chills, night sweats  Skin: negative, acne, rash and scaling  Eyes: negative, double vision, eye pain and photophobia  Ears/Nose/Throat: negative, nasal congestion and purulent rhinorrhea  Respiratory: No dyspnea on exertion, No cough, No hemoptysis and negative  Cardiovascular: negative, chest pain, exertional chest pain or pressure, paroxysmal nocturnal dyspnea, dyspnea on exertion and orthopnea         OBJECTIVE:  Blood pressure 105/72, pulse 72, height 1.702 m (5' 7\"), weight 63.1 kg (139 lb 1.6 oz), SpO2 98 %, not currently breastfeeding.  General Appearance: healthy, alert, active and no distress  Head: Normocephalic. No masses, lesions, tenderness or abnormalities  Eyes: conjuctiva clear, PERRL, EOM intact  Ears: External ears normal. Canals clear. TM's normal.  Nose: Nares normal  Mouth: normal  Neck: Supple, no cervical adenopathy, no thyromegaly  Lungs: clear to auscultation  Cardiac: regular rate and rhythm, normal S1 and S2, no murmur       ASSESSMENT/PLAN:  Patient here for evaluation of palpitations and chest heaviness.  One episode of tachycardia lasting 2 minutes with no associated symptoms.  No prior history of arrhythmia.  No significant risk factors.  Fairly active with no cardiac symptoms.  Reviewed EKG normal sinus rhythm.  Normal EKG.  EKG from outside hospital reviewed.  Which showed normal sinus rhythm.  Sinus bradycardia otherwise normal  Discussed the fact that needed tracing of the rhythm during " palpitation to diagnose and manage.  As her symptoms are very infrequent it may be difficult to obtain the tracing.  Will plan for an exercise stress echo to assess the intermittent chest tightness and a 2-week Zio patch.  If there is any recurrence of her symptoms we will plan further evaluation.  Patient will be contacted with the test result.  Per orders.   Return to Clinic as needed.  Total visit duration 45 minutes.  This includes face-to-face interview, physical exam, chart review, review of EKGs, care everywhere and documentation.

## 2021-12-14 NOTE — NURSING NOTE
Chief Complaint   Patient presents with     New Patient     follow up after 11-29-21 ED admission     Vitals were taken and medications were reconciled.   LOLIS Potter  8:04 AM

## 2022-01-11 ENCOUNTER — E-VISIT (OUTPATIENT)
Dept: FAMILY MEDICINE | Facility: CLINIC | Age: 52
End: 2022-01-11
Payer: COMMERCIAL

## 2022-01-11 DIAGNOSIS — Z20.822 EXPOSURE TO 2019 NOVEL CORONAVIRUS: Primary | ICD-10-CM

## 2022-01-11 PROCEDURE — 99421 OL DIG E/M SVC 5-10 MIN: CPT | Performed by: FAMILY MEDICINE

## 2022-01-12 ENCOUNTER — ALLIED HEALTH/NURSE VISIT (OUTPATIENT)
Dept: NURSING | Facility: CLINIC | Age: 52
End: 2022-01-12
Payer: COMMERCIAL

## 2022-01-12 DIAGNOSIS — Z20.822 EXPOSURE TO 2019 NOVEL CORONAVIRUS: ICD-10-CM

## 2022-01-12 PROCEDURE — U0005 INFEC AGEN DETEC AMPLI PROBE: HCPCS

## 2022-01-12 PROCEDURE — U0003 INFECTIOUS AGENT DETECTION BY NUCLEIC ACID (DNA OR RNA); SEVERE ACUTE RESPIRATORY SYNDROME CORONAVIRUS 2 (SARS-COV-2) (CORONAVIRUS DISEASE [COVID-19]), AMPLIFIED PROBE TECHNIQUE, MAKING USE OF HIGH THROUGHPUT TECHNOLOGIES AS DESCRIBED BY CMS-2020-01-R: HCPCS

## 2022-01-12 PROCEDURE — 99207 PR NO CHARGE LOS: CPT

## 2022-01-13 LAB — SARS-COV-2 RNA RESP QL NAA+PROBE: NEGATIVE

## 2022-04-08 ENCOUNTER — TELEPHONE (OUTPATIENT)
Dept: FAMILY MEDICINE | Facility: CLINIC | Age: 52
End: 2022-04-08
Payer: COMMERCIAL

## 2022-04-08 DIAGNOSIS — N63.20 LEFT BREAST LUMP: Primary | ICD-10-CM

## 2022-04-08 NOTE — TELEPHONE ENCOUNTER
Reason for call:  Patient reporting a symptom    Symptom or request: Patient was scheduling mammo and was denied because she has a lump on her breast and it says you can't schedule if you do. Pt wondering if Vega can put order in for diagnostic mammo    Duration (how long have symptoms been present): didn't specify     Have you been treated for this before? Yes    Additional comments: Wants mammo scheduled and just wants to be informed of time, said sending [employer] message is best way to contact    Phone Number patient can be reached at:  Cell number on file:    Telephone Information:   Mobile [phone]       Best Time:  Any    Can we leave a detailed message on this number:  YES    Call taken on 4/8/2022 at 8:34 AM by Ryan Gil

## 2022-04-08 NOTE — TELEPHONE ENCOUNTER
Left message on patient's voicemail that the order was done, and left the phone number to [facility], to set up this appointment.Mariza Rust MA/ALEJANDRA

## 2022-04-18 ENCOUNTER — ANCILLARY PROCEDURE (OUTPATIENT)
Dept: MAMMOGRAPHY | Facility: CLINIC | Age: 52
End: 2022-04-18
Attending: FAMILY MEDICINE
Payer: COMMERCIAL

## 2022-04-18 DIAGNOSIS — N63.20 LEFT BREAST LUMP: ICD-10-CM

## 2022-04-18 PROCEDURE — G0279 TOMOSYNTHESIS, MAMMO: HCPCS | Performed by: RADIOLOGY

## 2022-04-18 PROCEDURE — 77066 DX MAMMO INCL CAD BI: CPT | Performed by: RADIOLOGY

## 2022-05-14 ENCOUNTER — HEALTH MAINTENANCE LETTER (OUTPATIENT)
Age: 52
End: 2022-05-14

## 2022-05-25 ENCOUNTER — PATIENT OUTREACH (OUTPATIENT)
Dept: FAMILY MEDICINE | Facility: CLINIC | Age: 52
End: 2022-05-25
Payer: COMMERCIAL

## 2022-05-25 DIAGNOSIS — R87.810 CERVICAL HIGH RISK HPV (HUMAN PAPILLOMAVIRUS) TEST POSITIVE: ICD-10-CM

## 2022-07-19 NOTE — TELEPHONE ENCOUNTER
FYI to provider - Patient is lost to pap tracking follow-up. Attempts to contact pt have been made per reminder process and there has been no reply and/or no appt scheduled. Contact hx listed below.     10/31/16 [facility] Pap, + HR HPV 16 & other HR HPV. Plan colp  11/28/16 [place] ECC - benign. Plan cotest in 1 year.   12/6/17 [facility] Pap, + HR HPV 16 & other HR HPV. Plan colp  1/17/18 [place] no bx- Normal exam without visible pathology    Plan: Cotest in 1yr due by 12/6/18 12/18/18 [facility] pap, + HR HPV 16 and + HR HPV other. Plan colp.   1/7/19 [place] Bx BARB 2 ECC  [facility]IL. Plan LEEP  1/14/19 LEEP Bx Negative, ECC - Non-diagnostic. Plan cotest in 1 year.   3/2/20 NIL Pap, + HR HPV 16 (neg 18 & other). Plan colp  5/11/20 [place] visually normal; no biopsies taken. Cotest due 3/2/21.   4/7/21 NIL pap, + HR HPV 16 (neg 18 & other). [place] due by 7/7/21.   6/7/21 [place] Bx no BARB. Plan: cotest in 1 year  6/11/21 Pt notified by phone.   5/25/22 Reminder brethart  6/17/22 Reminder call -- left message  7/19/22 Lost to follow-up for pap tracking     Tori Wallace RN BSN, Pap Tracking

## 2022-09-03 ENCOUNTER — HEALTH MAINTENANCE LETTER (OUTPATIENT)
Age: 52
End: 2022-09-03

## 2022-10-31 ENCOUNTER — TRANSFERRED RECORDS (OUTPATIENT)
Dept: HEALTH INFORMATION MANAGEMENT | Facility: CLINIC | Age: 52
End: 2022-10-31

## 2023-01-29 ENCOUNTER — E-VISIT (OUTPATIENT)
Dept: FAMILY MEDICINE | Facility: CLINIC | Age: 53
End: 2023-01-29
Payer: COMMERCIAL

## 2023-01-29 DIAGNOSIS — J01.90 ACUTE SINUSITIS WITH SYMPTOMS > 10 DAYS: Primary | ICD-10-CM

## 2023-01-29 PROCEDURE — 99422 OL DIG E/M SVC 11-20 MIN: CPT | Performed by: FAMILY MEDICINE

## 2023-06-02 ENCOUNTER — HEALTH MAINTENANCE LETTER (OUTPATIENT)
Age: 53
End: 2023-06-02

## 2023-07-22 ENCOUNTER — HEALTH MAINTENANCE LETTER (OUTPATIENT)
Age: 53
End: 2023-07-22

## 2023-09-18 ENCOUNTER — HOSPITAL ENCOUNTER (OUTPATIENT)
Dept: MAMMOGRAPHY | Facility: CLINIC | Age: 53
Discharge: HOME | End: 2023-09-18
Attending: FAMILY MEDICINE | Admitting: FAMILY MEDICINE
Payer: COMMERCIAL

## 2023-09-18 DIAGNOSIS — Z12.31 VISIT FOR SCREENING MAMMOGRAM: ICD-10-CM

## 2023-09-18 PROCEDURE — 77067 SCR MAMMO BI INCL CAD: CPT

## 2023-10-25 ENCOUNTER — OFFICE VISIT (OUTPATIENT)
Dept: FAMILY MEDICINE | Facility: CLINIC | Age: 53
End: 2023-10-25
Payer: COMMERCIAL

## 2023-10-25 VITALS
BODY MASS INDEX: 22.03 KG/M2 | DIASTOLIC BLOOD PRESSURE: 77 MMHG | SYSTOLIC BLOOD PRESSURE: 115 MMHG | RESPIRATION RATE: 16 BRPM | HEART RATE: 85 BPM | OXYGEN SATURATION: 98 % | TEMPERATURE: 98.1 F | WEIGHT: 140.4 LBS | HEIGHT: 67 IN

## 2023-10-25 DIAGNOSIS — Z00.00 ROUTINE GENERAL MEDICAL EXAMINATION AT A HEALTH CARE FACILITY: Primary | ICD-10-CM

## 2023-10-25 DIAGNOSIS — G43.809 VESTIBULAR MIGRAINE: ICD-10-CM

## 2023-10-25 DIAGNOSIS — R87.810 CERVICAL HIGH RISK HPV (HUMAN PAPILLOMAVIRUS) TEST POSITIVE: ICD-10-CM

## 2023-10-25 DIAGNOSIS — Z13.6 CARDIOVASCULAR SCREENING; LDL GOAL LESS THAN 160: ICD-10-CM

## 2023-10-25 DIAGNOSIS — J45.30 MILD PERSISTENT ASTHMA WITHOUT COMPLICATION: ICD-10-CM

## 2023-10-25 DIAGNOSIS — F41.9 ANXIETY: ICD-10-CM

## 2023-10-25 PROCEDURE — 90471 IMMUNIZATION ADMIN: CPT | Performed by: FAMILY MEDICINE

## 2023-10-25 PROCEDURE — G0124 SCREEN C/V THIN LAYER BY MD: HCPCS | Performed by: PATHOLOGY

## 2023-10-25 PROCEDURE — 90715 TDAP VACCINE 7 YRS/> IM: CPT | Performed by: FAMILY MEDICINE

## 2023-10-25 PROCEDURE — 87624 HPV HI-RISK TYP POOLED RSLT: CPT | Performed by: FAMILY MEDICINE

## 2023-10-25 PROCEDURE — 36415 COLL VENOUS BLD VENIPUNCTURE: CPT | Performed by: FAMILY MEDICINE

## 2023-10-25 PROCEDURE — 82947 ASSAY GLUCOSE BLOOD QUANT: CPT | Performed by: FAMILY MEDICINE

## 2023-10-25 PROCEDURE — 91320 SARSCV2 VAC 30MCG TRS-SUC IM: CPT | Performed by: FAMILY MEDICINE

## 2023-10-25 PROCEDURE — 80061 LIPID PANEL: CPT | Performed by: FAMILY MEDICINE

## 2023-10-25 PROCEDURE — 99396 PREV VISIT EST AGE 40-64: CPT | Mod: 25 | Performed by: FAMILY MEDICINE

## 2023-10-25 PROCEDURE — G0145 SCR C/V CYTO,THINLAYER,RESCR: HCPCS | Performed by: FAMILY MEDICINE

## 2023-10-25 PROCEDURE — 90480 ADMN SARSCOV2 VAC 1/ONLY CMP: CPT | Performed by: FAMILY MEDICINE

## 2023-10-25 PROCEDURE — 99214 OFFICE O/P EST MOD 30 MIN: CPT | Mod: 25 | Performed by: FAMILY MEDICINE

## 2023-10-25 RX ORDER — ALPRAZOLAM 0.5 MG
0.5 TABLET ORAL
Qty: 90 TABLET | Refills: 1 | Status: SHIPPED | OUTPATIENT
Start: 2023-10-25

## 2023-10-25 RX ORDER — RIZATRIPTAN BENZOATE 10 MG/1
10 TABLET ORAL
Qty: 18 TABLET | Refills: 1 | Status: SHIPPED | OUTPATIENT
Start: 2023-10-25

## 2023-10-25 RX ORDER — ALBUTEROL SULFATE 90 UG/1
2 AEROSOL, METERED RESPIRATORY (INHALATION) EVERY 4 HOURS PRN
Qty: 18 G | Refills: 3 | Status: CANCELLED | OUTPATIENT
Start: 2023-10-25

## 2023-10-25 RX ORDER — ALBUTEROL SULFATE 90 UG/1
2 AEROSOL, METERED RESPIRATORY (INHALATION) EVERY 4 HOURS PRN
Qty: 18 G | Refills: 3 | Status: SHIPPED | OUTPATIENT
Start: 2023-10-25

## 2023-10-25 ASSESSMENT — ENCOUNTER SYMPTOMS
NAUSEA: 0
CONSTIPATION: 0
DIARRHEA: 0
NERVOUS/ANXIOUS: 0
MYALGIAS: 0
COUGH: 0
JOINT SWELLING: 0
FEVER: 0
DIZZINESS: 0
FREQUENCY: 0
PARESTHESIAS: 0
HEADACHES: 0
PALPITATIONS: 0
SHORTNESS OF BREATH: 0
CHILLS: 0
BREAST MASS: 0
SORE THROAT: 0
WEAKNESS: 0
HEARTBURN: 0
ARTHRALGIAS: 0
HEMATOCHEZIA: 0
HEMATURIA: 0
ABDOMINAL PAIN: 0
EYE PAIN: 0
DYSURIA: 0

## 2023-10-25 ASSESSMENT — ASTHMA QUESTIONNAIRES: ACT_TOTALSCORE: 25

## 2023-10-25 ASSESSMENT — PAIN SCALES - GENERAL: PAINLEVEL: MILD PAIN (3)

## 2023-10-25 NOTE — NURSING NOTE
Prior to immunization administration, verified patients identity using patient s name and date of birth. Please see Immunization Activity for additional information.     Screening Questionnaire for Adult Immunization    Are you sick today?   No   Do you have allergies to medications, food, a vaccine component or latex?   No   Have you ever had a serious reaction after receiving a vaccination?   No   Do you have a long-term health problem with heart, lung, kidney, or metabolic disease (e.g., diabetes), asthma, a blood disorder, no spleen, complement component deficiency, a cochlear implant, or a spinal fluid leak?  Are you on long-term aspirin therapy?   Yes   Do you have cancer, leukemia, HIV/AIDS, or any other immune system problem?   No   Do you have a parent, brother, or sister with an immune system problem?   No   In the past 3 months, have you taken medications that affect  your immune system, such as prednisone, other steroids, or anticancer drugs; drugs for the treatment of rheumatoid arthritis, Crohn s disease, or psoriasis; or have you had radiation treatments?   No   Have you had a seizure, or a brain or other nervous system problem?   No   During the past year, have you received a transfusion of blood or blood    products, or been given immune (gamma) globulin or antiviral drug?   No   For women: Are you pregnant or is there a chance you could become       pregnant during the next month?   No   Have you received any vaccinations in the past 4 weeks?   No     Immunization questionnaire was positive for at least one answer.  Notified Dr. Vega.      Patient instructed to remain in clinic for 15 minutes afterwards, and to report any adverse reactions.     Screening performed by Pedro Bergman CMA on 10/25/2023 at 3:11 PM.

## 2023-10-25 NOTE — LETTER
My Asthma Action Plan    Name: An Cornejo   YOB: 1970  Date: 10/25/2023   My doctor: Melania Vega MD   My clinic: [facility]        My Rescue Medicine:   Albuterol inhaler (Proair/Ventolin/Proventil HFA)  2-4 puffs EVERY 4 HOURS as needed. Use a spacer if recommended by your provider.   My Asthma Severity:   Intermittent / Exercise Induced  Know your asthma triggers: upper respiratory infections             GREEN ZONE   Good Control  I feel good  No cough or wheeze  Can work, sleep and play without asthma symptoms       Take your asthma control medicine every day.     If exercise triggers your asthma, take your rescue medication  15 minutes before exercise or sports, and  During exercise if you have asthma symptoms  Spacer to use with inhaler: If you have a spacer, make sure to use it with your inhaler             YELLOW ZONE Getting Worse  I have ANY of these:  I do not feel good  Cough or wheeze  Chest feels tight  Wake up at night   Keep taking your Green Zone medications  Start taking your rescue medicine:  every 20 minutes for up to 1 hour. Then every 4 hours for 24-48 hours.  If you stay in the Yellow Zone for more than 12-24 hours, contact your doctor.  If you do not return to the Green Zone in 12-24 hours or you get worse, start taking your oral steroid medicine if prescribed by your provider.           RED ZONE Medical Alert - Get Help  I have ANY of these:  I feel awful  Medicine is not helping  Breathing getting harder  Trouble walking or talking  Nose opens wide to breathe       Take your rescue medicine NOW  If your provider has prescribed an oral steroid medicine, start taking it NOW  Call your doctor NOW  If you are still in the Red Zone after 20 minutes and you have not reached your doctor:  Take your rescue medicine again and  Call 911 or go to the emergency room right away    See your regular doctor within 2 weeks of an Emergency Room or Urgent Care  visit for follow-up treatment.          Annual Reminders:  Meet with Asthma Educator,  Flu Shot in the Fall, consider Pneumonia Vaccination for patients with asthma (aged 19 and older).    Pharmacy:    [facility][id number] - [place] - [address] [facility] [address] [place] [address]  [facility] [facility] [place] - ONE [facility] AT PORTAL TO REGISTERED [facility] SITES    Electronically signed by Melania Vega MD   Date: 10/25/23                    Asthma Triggers  How To Control Things That Make Your Asthma Worse    Triggers are things that make your asthma worse.  Look at the list below to help you find your triggers and   what you can do about them. You can help prevent asthma flare-ups by staying away from your triggers.      Trigger                                                          What you can do   Cigarette Smoke  Tobacco smoke can make asthma worse. Do not allow smoking in your home, car or around you.  Be sure no one smokes at a child s day care or school.  If you smoke, ask your health care provider for ways to help you quit.  Ask family members to quit too.  Ask your health care provider for a referral to Quit Plan to help you quit smoking, or call 3-203-578-PLAN.     Colds, Flu, Bronchitis  These are common triggers of asthma. Wash your hands often.  Don t touch your eyes, nose or mouth.  Get a flu shot every year.     Dust Mites  These are tiny bugs that live in cloth or carpet. They are too small to see. Wash sheets and blankets in hot water every week.   Encase pillows and mattress in dust mite proof covers.  Avoid having carpet if you can. If you have carpet, vacuum weekly.   Use a dust mask and HEPA vacuum.   Pollen and Outdoor Mold  Some people are allergic to trees, grass, or weed pollen, or molds. Try to keep your windows closed.  Limit time out doors when pollen count is high.   Ask you  health care provider about taking medicine during allergy season.     Animal Dander  Some people are allergic to skin flakes, urine or saliva from pets with fur or feathers. Keep pets with fur or feathers out of your home.    If you can t keep the pet outdoors, then keep the pet out of your bedroom.  Keep the bedroom door closed.  Keep pets off cloth furniture and away from stuffed toys.     Mice, Rats, and Cockroaches  Some people are allergic to the waste from these pests.   Cover food and garbage.  Clean up spills and food crumbs.  Store grease in the refrigerator.   Keep food out of the bedroom.   Indoor Mold  This can be a trigger if your home has high moisture. Fix leaking faucets, pipes, or other sources of water.   Clean moldy surfaces.  Dehumidify basement if it is damp and smelly.   Smoke, Strong Odors, and Sprays  These can reduce air quality. Stay away from strong odors and sprays, such as perfume, powder, hair spray, paints, smoke incense, paint, cleaning products, candles and new carpet.   Exercise or Sports  Some people with asthma have this trigger. Be active!  Ask your doctor about taking medicine before sports or exercise to prevent symptoms.    Warm up for 5-10 minutes before and after sports or exercise.     Other Triggers of Asthma  Cold air:  Cover your nose and mouth with a scarf.  Sometimes laughing or crying can be a trigger.  Some medicines and food can trigger asthma.

## 2023-10-25 NOTE — PROGRESS NOTES
SUBJECTIVE:   CC: An is an 53 year old who presents for preventive health visit.       10/25/2023     2:02 PM   Additional Questions   Roomed by Pedro       Healthy Habits:     Getting at least 3 servings of Calcium per day:  Yes    Bi-annual eye exam:  NO    Dental care twice a year:  NO    Sleep apnea or symptoms of sleep apnea:  Daytime drowsiness    Diet:  Regular (no restrictions)    Frequency of exercise:  None    Medication side effects:  Not applicable    Additional concerns today:  Yes      Today's PHQ-2 Score:       10/25/2023    11:33 AM   PHQ-2 ( 1999 Pfizer)   Q1: Little interest or pleasure in doing things 0   Q2: Feeling down, depressed or hopeless 0   PHQ-2 Score 0   Q1: Little interest or pleasure in doing things Not at all   Q2: Feeling down, depressed or hopeless Not at all   PHQ-2 Score 0                       Social History     Tobacco Use    Smoking status: Never    Smokeless tobacco: Never    Tobacco comments:     nonsmoking household   Substance Use Topics    Alcohol use: Yes     Comment: 2 glasses of gin and tonic may be once per month             10/25/2023    11:32 AM   Alcohol Use   Prescreen: >3 drinks/day or >7 drinks/week? No     Reviewed orders with patient.  Reviewed health maintenance and updated orders accordingly - Yes    G 3 P 2   No LMP recorded (lmp unknown). Patient is postmenopausal.     Fasting: No   Td:tdap 10/25/23        Flu: declined      Covid: 10/25/23       Shingrix: discussed      PPV: discussed      Last 3 Pap and HPV Results:       Latest Ref Rng & Units 4/7/2021     4:16 PM 4/7/2021     4:12 PM 3/2/2020     4:00 PM   PAP / HPV   PAP (Historical)  NIL      HPV 16 DNA NEG^Negative  Positive  Positive    HPV 18 DNA NEG^Negative  Negative  Negative    Other HR HPV NEG^Negative  Negative  Negative                   Cholesterol:   Lab Results   Component Value Date    CHOL 207 09/21/2018     Lab Results   Component Value Date    HDL 80 09/21/2018     Lab Results    Component Value Date     2018     Lab Results   Component Value Date    TRIG 80 2018     Lab Results   Component Value Date    CHOLHDLRATIO 2.2 10/13/2014         MM2023  Dexa:  NA     Flex/colo: 2016 q10y scope      Seat Belt: Yes    Sunscreen use: Yes   Calcium Intake: adeq  Health Care Directive: Yes   Sexually Active: Yes     Current contraception: none  History of abnormal Pap smear: Yes: see prob list  Family history of colon/breast/ovarian cancer: Yes: see [facility]  Regular self breast exam: Yes  History of abnormal mammogram: Yes: see reprots      Labs reviewed in EPIC  BP Readings from Last 3 Encounters:   10/25/23 115/77   21 105/72   21 131/77    Wt Readings from Last 3 Encounters:   10/25/23 63.7 kg (140 lb 6.4 oz)   21 63.1 kg (139 lb 1.6 oz)   21 62.6 kg (138 lb)                  Patient Active Problem List   Diagnosis    CARDIOVASCULAR SCREENING; LDL GOAL LESS THAN 160    Anxiety    Mild persistent asthma    Asthma, exercise induced    Exposure to pertussis    Rectocele    KATIE (stress urinary incontinence, female)    Cervical high risk HPV (human papillomavirus) test positive    Other irritable bowel syndrome    S/P LEEP     Past Surgical History:   Procedure Laterality Date    BIOPSY      BREAST LUMPECTOMY, RT/LT Left     Benign lesion as per patient    COLONOSCOPY  2017    COLPORRHAPHY POSTERIOR N/A 10/21/2014    Procedure: COLPORRHAPHY POSTERIOR;  Surgeon: Fabian Harding MD;  Location: [facility]    CYSTOSCOPY, SLING TRANSVAGINAL N/A 10/21/2014    Procedure: CYSTOSCOPY, SLING TRANSVAGINAL;  Surgeon: Fabian Harding MD;  Location: [facility] OR    EXTRACTION(S) DENTAL      PERINEORRHAPHY N/A 10/21/2014    Procedure: PERINEORRHAPHY;  Surgeon: Fabian Harding MD;  Location: [facility] OR    RECONSTRUCT BREAST BILATERAL         Social History     Tobacco Use    Smoking status: Never    Smokeless tobacco: Never    Tobacco comments:     nonsmoking household    Substance Use Topics    Alcohol use: Yes     Comment: 2 glasses of gin and tonic may be once per month     Family History   Problem Relation Age of Onset    Depression Mother     Hypertension Mother     Lipids Mother     Arthritis Mother     Asthma Mother     Pulmonary Embolism Mother     Breast Cancer Mother     Diabetes Mother     Breast Cancer Maternal Grandmother         postmenopausal    Cancer Maternal Grandmother     Respiratory Paternal Grandfather         emphysema    Hypertension Father     Prostate Cancer Father     Cancer Sister         Thyroid    Neurologic Disorder Sister         MS         Current Outpatient Medications   Medication Sig Dispense Refill    albuterol (PROAIR HFA/PROVENTIL HFA/VENTOLIN HFA) 108 (90 Base) MCG/ACT inhaler Inhale 2 puffs into the lungs every 4 hours as needed for shortness of breath / dyspnea 18 g 3    ALPRAZolam (XANAX) 0.5 MG tablet Take 1 tablet (0.5 mg) by mouth nightly as needed for sleep 90 tablet 1    rizatriptan (MAXALT) 10 MG tablet Take 1 tablet (10 mg) by mouth at onset of headache for migraine May repeat in 2 hours. Max 3 tablets/24 hours. 18 tablet 1       Breast Cancer Screenin/7/2021     9:53 AM   Breast CA Risk Assessment (FHS-7)   Do you have a family history of breast, colon, or ovarian cancer? No / Unknown         Mammogram Screening: Recommended annual mammography  Pertinent mammograms are reviewed under the imaging tab.        Reviewed and updated as needed this visit by clinical staff   Tobacco  Allergies  Meds  Problems  Med Hx  Surg Hx  Fam Hx          Reviewed and updated as needed this visit by Provider   Tobacco  Allergies  Meds  Problems  Med Hx  Surg Hx  Fam Hx             Review of Systems   Constitutional:  Negative for chills and fever.   HENT:  Negative for congestion, ear pain, hearing loss and sore throat.    Eyes:  Positive for visual disturbance. Negative for pain.   Respiratory:  Negative for cough and  "shortness of breath.    Cardiovascular:  Negative for chest pain, palpitations and peripheral edema.   Gastrointestinal:  Negative for abdominal pain, constipation, diarrhea, heartburn, hematochezia and nausea.   Breasts:  Negative for tenderness, breast mass and discharge.   Genitourinary:  Negative for dysuria, frequency, genital sores, hematuria, pelvic pain, urgency, vaginal bleeding and vaginal discharge.   Musculoskeletal:  Negative for arthralgias, joint swelling and myalgias.   Skin:  Negative for rash.   Neurological:  Negative for dizziness, weakness, headaches and paresthesias.   Psychiatric/Behavioral:  Negative for mood changes. The patient is not nervous/anxious.           OBJECTIVE:   /77   Pulse 85   Temp 98.1  F (36.7  C) (Oral)   Resp 16   Ht 1.689 m (5' 6.5\")   Wt 63.7 kg (140 lb 6.4 oz)   LMP  (LMP Unknown)   SpO2 98%   BMI 22.32 kg/m    Physical Exam  GENERAL APPEARANCE: healthy, alert and no distress  EYES: Eyes grossly normal to inspection, PERRL and conjunctivae and sclerae normal  HENT: ear canals and TM's normal, nose and mouth without ulcers or lesions, oropharynx clear and oral mucous membranes moist  NECK: no adenopathy, no asymmetry, masses, or scars and thyroid normal to palpation  RESP: lungs clear to auscultation - no rales, rhonchi or wheezes  BREAST: normal without masses, tenderness or nipple discharge and no palpable axillary masses or adenopathy  CV: regular rate and rhythm, normal S1 S2, no S3 or S4, no murmur, click or rub, no peripheral edema and peripheral pulses strong  ABDOMEN: soft, nontender, no hepatosplenomegaly, no masses and bowel sounds normal   (female): normal female external genitalia, normal urethral meatus, vaginal mucosal atrophy noted, normal cervix, adnexae, and uterus without masses or abnormal discharge  MS: no musculoskeletal defects are noted and gait is age appropriate without ataxia  SKIN: no suspicious lesions or rashes, small 4 mm " raised lesion on right upper chest,  NEURO: Normal strength and tone, sensory exam grossly normal, mentation intact and speech normal  PSYCH: mentation appears normal and affect normal/bright    Diagnostic Test Results:  Labs reviewed in Epic    ASSESSMENT/PLAN:   (Z00.00) Routine general medical examination at a health care facility  (primary encounter diagnosis)  (R87.810) Cervical high risk HPV (human papillomavirus) test positive  Comment: An,  Here are your lab results.  We will discuss these at your upcoming visit.   See you soon.  Melania Vega MD    Plan: Pap Screen with HPV - recommended age 30 - 65         years, Glucose        see orders in Epic.   Discussed recommended follow-up of HPV 16  Return for punch removal of skin lesion on chest    (J45.30) Mild persistent asthma without complication  Comment: stable and well controlled, rarely needs inhaler  Plan: albuterol (PROAIR HFA/PROVENTIL HFA/VENTOLIN         HFA) 108 (90 Base) MCG/ACT inhaler        Refill x 1 yr     (G43.809) Vestibular migraine  Comment: stable and rare  Plan: rizatriptan (MAXALT) 10 MG tablet        Refill x 1 yr     (F41.9) Anxiety  Comment: increased stress due to elderly, medically complicated parents and move to [place]  Plan: ALPRAZolam (XANAX) 0.5 MG tablet         Refill x 6 months     (Z13.6) CARDIOVASCULAR SCREENING; LDL GOAL LESS THAN 160  Comment: due  Plan: Lipid panel reflex to direct LDL Non-fasting                  COUNSELING:  Reviewed preventive health counseling, as reflected in patient instructions  Special attention given to:        Regular exercise       Healthy diet/nutrition        She reports that she has never smoked. She has never used smokeless tobacco.          Melania Vega MD  [facility]

## 2023-10-26 LAB
CHOLEST SERPL-MCNC: 215 MG/DL
FASTING STATUS PATIENT QL REPORTED: YES
GLUCOSE SERPL-MCNC: 90 MG/DL (ref 70–99)
HDLC SERPL-MCNC: 77 MG/DL
LDLC SERPL CALC-MCNC: 121 MG/DL
NONHDLC SERPL-MCNC: 138 MG/DL
TRIGL SERPL-MCNC: 87 MG/DL

## 2023-10-30 LAB
BKR LAB AP GYN ADEQUACY: ABNORMAL
BKR LAB AP GYN INTERPRETATION: ABNORMAL
BKR LAB AP HPV REFLEX: ABNORMAL
BKR LAB AP PREVIOUS ABNL DX: ABNORMAL
BKR LAB AP PREVIOUS ABNORMAL: ABNORMAL
PATH REPORT.COMMENTS IMP SPEC: ABNORMAL
PATH REPORT.COMMENTS IMP SPEC: ABNORMAL
PATH REPORT.RELEVANT HX SPEC: ABNORMAL

## 2023-11-01 ENCOUNTER — PATIENT OUTREACH (OUTPATIENT)
Dept: FAMILY MEDICINE | Facility: CLINIC | Age: 53
End: 2023-11-01
Payer: COMMERCIAL

## 2023-11-01 LAB
HUMAN PAPILLOMA VIRUS 16 DNA: POSITIVE
HUMAN PAPILLOMA VIRUS 18 DNA: NEGATIVE
HUMAN PAPILLOMA VIRUS FINAL DIAGNOSIS: ABNORMAL
HUMAN PAPILLOMA VIRUS OTHER HR: NEGATIVE

## 2023-11-06 ENCOUNTER — TELEPHONE (OUTPATIENT)
Dept: FAMILY MEDICINE | Facility: CLINIC | Age: 53
End: 2023-11-06
Payer: COMMERCIAL

## 2023-11-06 NOTE — TELEPHONE ENCOUNTER
Patient is calling. She is scheduled for a biopsy on 11/14 and a colposcopy on 11/16/23. She wants to know if these appointments can be combined into 1 appointment, she would prefer that this would be Thursday 11/16/23.Mariza Rust [facility]

## 2023-11-06 NOTE — TELEPHONE ENCOUNTER
Would need to block the [facility] closest to her colposcopy appt to have enough time.    Ok to do that. Then cancel 11/14/2023 appt.

## 2023-11-16 ENCOUNTER — OFFICE VISIT (OUTPATIENT)
Dept: FAMILY MEDICINE | Facility: CLINIC | Age: 53
End: 2023-11-16
Payer: COMMERCIAL

## 2023-11-16 VITALS
WEIGHT: 140 LBS | DIASTOLIC BLOOD PRESSURE: 76 MMHG | BODY MASS INDEX: 21.97 KG/M2 | HEIGHT: 67 IN | HEART RATE: 75 BPM | TEMPERATURE: 97.6 F | SYSTOLIC BLOOD PRESSURE: 117 MMHG | RESPIRATION RATE: 14 BRPM | OXYGEN SATURATION: 97 %

## 2023-11-16 DIAGNOSIS — D48.5 NEOPLASM OF UNCERTAIN BEHAVIOR OF SKIN OF CHEST: ICD-10-CM

## 2023-11-16 DIAGNOSIS — R87.611 ATYPICAL SQUAMOUS CELLS CANNOT EXCLUDE HIGH GRADE SQUAMOUS INTRAEPITHELIAL LESION ON CYTOLOGIC SMEAR OF CERVIX (ASC-H): Primary | ICD-10-CM

## 2023-11-16 DIAGNOSIS — D06.9 CIN III WITH SEVERE DYSPLASIA: ICD-10-CM

## 2023-11-16 DIAGNOSIS — C44.519 BASAL CELL CARCINOMA (BCC) OF CHEST: ICD-10-CM

## 2023-11-16 PROCEDURE — 88305 TISSUE EXAM BY PATHOLOGIST: CPT | Performed by: PATHOLOGY

## 2023-11-16 PROCEDURE — 11104 PUNCH BX SKIN SINGLE LESION: CPT | Performed by: FAMILY MEDICINE

## 2023-11-16 PROCEDURE — 57505 ENDOCERVICAL CURETTAGE: CPT | Performed by: FAMILY MEDICINE

## 2023-11-16 PROCEDURE — 99207 PR NO CHARGE LOS: CPT | Performed by: FAMILY MEDICINE

## 2023-11-16 ASSESSMENT — PAIN SCALES - GENERAL: PAINLEVEL: NO PAIN (0)

## 2023-11-16 NOTE — PROGRESS NOTES
S:  An Cornejo is a 53 year old female here for a colposcopy.    10/31/16 [facility] Pap, + HR HPV 16 & other HR HPV. Plan colp  11/28/16 [place] ECC - benign. Plan cotest in 1 year.   12/6/17 [facility] Pap, + HR HPV 16 & other HR HPV. Plan colp  1/17/18 [place] no bx- Normal exam without visible pathology    Plan: Cotest in 1yr due by 12/6/18 12/18/18 [facility] pap, + HR HPV 16 and + HR HPV other. Plan colp.   1/7/19 [place] Bx BARB 2 ECC  HSIL. Plan LEEP  1/14/19 LEEP Bx Negative, ECC - Non-diagnostic. Plan cotest in 1 year.   3/2/20 NIL Pap, + HR HPV 16. Plan colp  5/11/20 [place] visually normal; no biopsies taken. Cotest due 3/2/21.   4/7/21 NIL pap, + HR HPV 16. [place] due by 7/7/21.   6/7/21 [place] Bx no BARB. Plan: cotest in 1 year  7/19/22 Lost to follow-up for pap tracking   10/25/23 [facility], +HR HPV 16. Plan: colposcopy due before 1/25/24    Cervical risk factors: Tobacco Use no      Previous STD none      Previous dysplasia: yes      Previous colposcopy: yes:  date see prob list.       Previous treatment:  LEEP date 1/14/2019, negative         Current birth control: none  No LMP recorded (lmp unknown). Patient is postmenopausal.  I discussed the history of HPV and the risk of dysplasia/cancer.  I explained the indications for the colposcopy and the procedure in detail.  I discussed the potential risks including bleeding, infection and cramping. I have recommend abstinence for 2 weeks and no vigorous activity for 48 hours.  Consent form was signed by patient and all questions were answered.    PMH/PSH/Meds/All were reviewed and updated at this visit.    Also with suspicious skin lesion on right upper chest that should be removed.  4 mm raised lesion may have increased in size.        O:  Vitals noted.  Patient alert, oriented, and in no acute distress. She was placed on the exam table in the dorsal position and a sterile speculum inserted into the vagina. The cervix was easily visualized.  Acetic acid was applied to better visualize  the transformation zone.  Colposcopy was performed in the usual fashion.     Unenhanced examination of the cervix was normal without lesions  Pap repeated?:  No  SCJ seen?:  yes  Endocervical speculum needed?:  No  ECC done?:  Yes   Lugol's solution used?:  Yes   Satisfactory examination?:  yes    Vaginal vault: normal to cursory inspection   Urethra normal?:  yes  Labia normal?:  yes  Perineum normal?:  yes    FINDINGS:    Cervix: no visible lesions  Procedure: ECC only, no biopsies.    Procedure summary: Patient tolerated procedure well   (R87.611) Atypical squamous cells cannot exclude high grade squamous intraepithelial lesion on cytologic smear of cervix ([facility])  (primary encounter diagnosis)  Comment: Colposcopy of the cervix and upper/adjacent vagina Normal exam without visible pathology with ECC.  Plan: COLPOSCOPY, ENDOCERVICAL CURETTAGE, Surgical         Pathology Exam        Will await pathology results, refer to gyn for any dysplasia o/w repeat cotest in 1 yr    (D48.5) Neoplasm of uncertain behavior of skin of chest  Comment: changing skin lesion  Plan: PA PUNCH BIOPSY OF SKIN, FIRST/SINGLE LESION,         Surgical Pathology Exam        After informed written consent was obtained, using Betadine for cleansing and 1% Lidocaine with epinephrine for anesthetic, with sterile technique a 5 mm punch biopsy was used to remove the lesion. Hemostasis was obtained by pressure and wound was sutured with 2 interrupted 4'0 ethilon sutures.  Antibiotic dressing is applied, and wound care instructions provided. Be alert for any signs of cutaneous infection. The specimen is labeled and sent to pathology for evaluation. The procedure was well tolerated without complications.

## 2023-11-16 NOTE — PATIENT INSTRUCTIONS
Nothing in the vagina (no sex/tampons) for 2 weeks.  Monitor for symptoms of infection to include a foul smelling discharge, abdominal tenderness or fever without other explanation.  Monitor for heavy bleeding (soaking a maxi pad every hour for 2-3 hours).      Keep wound dry for 24 hours then you can shower.  Do not submerge wound for 2 weeks.  Do not soak or scrub the wound. Do not take a bath, go swimming, or use a hot tub.   If recommended by your healthcare provider, you may put a small amount of antibiotic ointment on the wound each time you clean it. This can prevent infection. It will also help keep bandages from sticking to the wound. If a rash appears, stop using the ointment.   Make sure the wound is kept dry between washings. After showering or bathing, gently pat the wound dry with a soft towel.   Protect the wound from repeat injury until the skin has had time to heal.   Protect the wound from a lot of exposure to sunlight or tanning lamps while skin glue is in place. Wounds exposed to the sun can become red, while scars that have not been exposed to the sun usually turn white after a period of time.   Do not scratch, rub, or pick at your wound.    Any wound can become infected. When you are cleaning your wound, look for these signs of infection:   increased redness   red streaks   increased swelling   increased pain or tenderness   pus or other drainage   warmth in the area of the wound   fever.   Contact your provider if you see any signs of infection.

## 2023-11-22 ENCOUNTER — TELEPHONE (OUTPATIENT)
Dept: FAMILY MEDICINE | Facility: CLINIC | Age: 53
End: 2023-11-22
Payer: COMMERCIAL

## 2023-11-22 LAB
PATH REPORT.COMMENTS IMP SPEC: NORMAL
PATH REPORT.FINAL DX SPEC: NORMAL
PATH REPORT.FINAL DX SPEC: NORMAL
PATH REPORT.GROSS SPEC: NORMAL
PATH REPORT.GROSS SPEC: NORMAL
PATH REPORT.MICROSCOPIC SPEC OTHER STN: NORMAL
PATH REPORT.MICROSCOPIC SPEC OTHER STN: NORMAL
PATH REPORT.RELEVANT HX SPEC: NORMAL
PATH REPORT.RELEVANT HX SPEC: NORMAL
PHOTO IMAGE: NORMAL
PHOTO IMAGE: NORMAL

## 2023-11-22 NOTE — TELEPHONE ENCOUNTER
Pt saw two new lab results and is concerned they are cancer.      Pt is traveling to [place], they are driving 1200 miles on Friday and she needs to know if she needs to stay in [place] or can go to [place].    Pt is hoping provider can advise today.  Tori Ulloa BSN, RN  ]

## 2023-11-27 ENCOUNTER — TELEPHONE (OUTPATIENT)
Dept: DERMATOLOGY | Facility: CLINIC | Age: 53
End: 2023-11-27
Payer: COMMERCIAL

## 2023-11-27 NOTE — TELEPHONE ENCOUNTER
Called patient to schedule surgery with Dr. Harvey    Date of Surgery: 01/22    Surgery type: Mohs    Consult scheduled: Yes    Has patient had mohs with us before? No    Additional comments: pt will be in the DR 01/10-/01/20.   We also scheduled a skin check with Gen Zachary Silva on 11/27/2023 at 10:45 AM

## 2023-11-29 NOTE — TELEPHONE ENCOUNTER
FUTURE VISIT INFORMATION      FUTURE VISIT INFORMATION:  Date: 1.2.24  Time: 3:15  Location: [facility]  REFERRAL INFORMATION:  Referring provider:  Gary  Referring providers clinic:  [facility]  Reason for visit/diagnosis  Basal cell carcinoma (BCC) of chest. pt also has one other spot of concern that she asked for Dr. Harvey to check.      RECORDS REQUESTED FROM:       Clinic name Comments Records Status Imaging Status   [facility] 11.16.23  Path # [id number] [facility] No photo

## 2023-12-04 ENCOUNTER — OFFICE VISIT (OUTPATIENT)
Dept: OBGYN | Facility: CLINIC | Age: 53
End: 2023-12-04
Attending: FAMILY MEDICINE
Payer: COMMERCIAL

## 2023-12-04 ENCOUNTER — TELEPHONE (OUTPATIENT)
Dept: OBGYN | Facility: CLINIC | Age: 53
End: 2023-12-04

## 2023-12-04 VITALS
HEIGHT: 67 IN | BODY MASS INDEX: 22.51 KG/M2 | OXYGEN SATURATION: 97 % | DIASTOLIC BLOOD PRESSURE: 80 MMHG | WEIGHT: 143.4 LBS | HEART RATE: 72 BPM | SYSTOLIC BLOOD PRESSURE: 134 MMHG

## 2023-12-04 DIAGNOSIS — D06.9 CIN III WITH SEVERE DYSPLASIA: Primary | ICD-10-CM

## 2023-12-04 PROCEDURE — 99215 OFFICE O/P EST HI 40 MIN: CPT | Performed by: OBSTETRICS & GYNECOLOGY

## 2023-12-04 NOTE — PROGRESS NOTES
"An is a 53 year old   here for follow up from her colposcopy with Dr. Vega.  Pathology:  A.  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  She is s/p LEEP 2019  ROS: Ten point review of systems was reviewed and negative except the above.    PMH: Her past medical, surgical, and obstetric histories were reviewed and are documented in their appropriate chart areas.    ALL/Meds: Her medication and allergy histories were reviewed and are documented in their appropriate chart areas.    SH/FMH: Reviewed and documented in the appropriate area.    PE: /80 (BP Location: Right arm, Patient Position: Sitting, Cuff Size: Adult Regular)   Pulse 72   Ht 1.689 m (5' 6.5\")   Wt 65 kg (143 lb 6.4 oz)   LMP  (LMP Unknown)   SpO2 97%   BMI 22.80 kg/m      Discussed the procedure for COLD KNIFE CONIZATION.   Explained the risk of bleeding, infection, possible cervical stenosis, infertility and incompetent cervix.  Discussed the recovery and follow up required.  Discussed the options regarding anesthesia.  She is given the opportunity to ask questions and have them answered.  She agrees to proceed.   Discussed the potential for invasive cancer and the need for Referral to GYN ONC for Radical Hysterectomy in that case.  Discussed her desire for hysterectomy, even if there isn't invasive cancer.  We would plan a TOTAL LAPAROSCOPIC HYSTERECTOMY BILATERAL SALPINGECTOMY or BILATERAL SALPINGO-OOPHRECTOMY after 6 weeks of healing.    A/P:   An presents with (D06.9) BARB III with severe dysplasia  (primary encounter diagnosis)  Comment: 45 minutes spent on the date of the encounter doing chart review, review of test results, patient visit, and documentation    Plan: COLD KNIFE CONIZATION         -    No orders of the defined types were placed in this encounter.        Stan Del Angel MD FACOG   "

## 2023-12-04 NOTE — TELEPHONE ENCOUNTER
Associated Diagnoses    BARB III with severe dysplasia [D06.9]  - Primary        Source Order Set    Order Set Name Order ID    [id number]     Case Request: Case Info    Panel 1    Providers    Provider Role Service   Stan Del Angel MD Primary      Procedures    Procedure Laterality Anesthesia Region   Cold Knife Conization N/A Choice Vagina                  Requested date:   Location:  OR   Patient class:      Pre-op diagnoses: BARB III with severe dysplasia     Scheduling Instructions    Additional Instructions for the Case  Procedure notes:  Need to work this in ASAP before the end of the year  Procedure length:  30 minutes  Diagnosis:  Severe Dysplasia/Carcinoma In situ  Request additional equipment:    Location[facility]  [facility]  Sterilization consent signed:  NA  OR staff assist:  no  H&P to be completed by PCP  Post op appointment needed:  no  Scheduling instructions:  Let me know if you are running into road blocks.   We could move it to the hospital if needed            SURGERY SCHEDULING AND PRECERTIFICATION    Medical Record Number: [id number]  An Mckinneyny  YOB: 1970   Phone: [phone] (home)   Primary Provider: Melania Vega    Reason for Admit:  ICD-10 CODE:  D06.9    Surgeon: Stan Del Angel MD  Surgical Procedure: Cold Knife Conization    Date of Surgery 12/26 Time of Surgery 10am  Surgery [facility] be performed at:  [facility]   Status: Outpatient  Type of Anesthesia Anticipated: Choice    Sterilization consent:  Not applicable to procedure being performed.    Pre-Op: On 12/12 with Tania Vences at [place]  COVID testing:  Per Provider's discretion Covid testing is not indicated.     Post-Op:  not needed    Pre-certification routed to Financial Counselors:  Auto routes via Case Request    Surgery packet mailed to patient's home address: Yes  Patient instructed NPO 12 hours prior to surgery, arrive according to the  time the nurse gives patient when called prior to surgery, must have a .  Patient understood and agrees to the plan.      Requestor:  Taylor Yeh     Location:  [place] [id number]

## 2023-12-04 NOTE — PATIENT INSTRUCTIONS
If you have any questions regarding your visit, Please contact your care team.     [facility]: [phone]    To Schedule an Appointment 24/7  Call: [phone]     Stan Del Angel MD  Medical Director        Vanessa-EVENS Cuenca-RN  Sara Brown-Surgery Scheduler  Lilliam-Surgery Scheduler               Tuesday-[place]  7:30 a.m-4:30 p.m    Thursday-[place]  7:30 a.m-4:30 p.m    Typical Surgery Days: Tuesday or Friday [facility]  [address] [place] [address]  PH: [phone]    Imaging Scheduling all locations  PH: [phone]     [facility]  [address] [place] [address] [place] [address]  PH: [phone]    [facility]  [address] [place] [address]  PH: [phone] Fax      **Surgeries** Our Surgery Schedulers will contact you to schedule. If you do not receive a call within 3 business days, please call [phone].    Urgent Care locations:  [facility] Monday-Friday  10 am - 8 pm  Saturday and Sunday   9 am - 5 pm  Monday-Friday   10 am - 8 pm  Saturday and Sunday   9 am - 5 pm   [phone]   If you need a medication refill, please contact your pharmacy. Please allow 3 business days for your refill to be completed.  As always, Thank you for trusting us with your healthcare needs!    see additional instructions from your care team below

## 2023-12-12 ENCOUNTER — OFFICE VISIT (OUTPATIENT)
Dept: FAMILY MEDICINE | Facility: CLINIC | Age: 53
End: 2023-12-12
Payer: COMMERCIAL

## 2023-12-12 VITALS
BODY MASS INDEX: 22.29 KG/M2 | RESPIRATION RATE: 16 BRPM | TEMPERATURE: 97.3 F | DIASTOLIC BLOOD PRESSURE: 75 MMHG | WEIGHT: 142 LBS | HEIGHT: 67 IN | OXYGEN SATURATION: 100 % | SYSTOLIC BLOOD PRESSURE: 107 MMHG | HEART RATE: 79 BPM

## 2023-12-12 DIAGNOSIS — Z01.818 PREOP GENERAL PHYSICAL EXAM: Primary | ICD-10-CM

## 2023-12-12 DIAGNOSIS — D06.9 CIN III WITH SEVERE DYSPLASIA: ICD-10-CM

## 2023-12-12 LAB
ANION GAP SERPL CALCULATED.3IONS-SCNC: 9 MMOL/L (ref 7–15)
BASOPHILS # BLD AUTO: 0 10E3/UL (ref 0–0.2)
BASOPHILS NFR BLD AUTO: 1 %
BUN SERPL-MCNC: 10.8 MG/DL (ref 6–20)
CALCIUM SERPL-MCNC: 9.7 MG/DL (ref 8.6–10)
CHLORIDE SERPL-SCNC: 102 MMOL/L (ref 98–107)
CREAT SERPL-MCNC: 0.83 MG/DL (ref 0.51–0.95)
DEPRECATED HCO3 PLAS-SCNC: 29 MMOL/L (ref 22–29)
EGFRCR SERPLBLD CKD-EPI 2021: 83 ML/MIN/1.73M2
EOSINOPHIL # BLD AUTO: 0.1 10E3/UL (ref 0–0.7)
EOSINOPHIL NFR BLD AUTO: 4 %
ERYTHROCYTE [DISTWIDTH] IN BLOOD BY AUTOMATED COUNT: 12.7 % (ref 10–15)
GLUCOSE SERPL-MCNC: 90 MG/DL (ref 70–99)
HCT VFR BLD AUTO: 41.8 % (ref 35–47)
HGB BLD-MCNC: 13.5 G/DL (ref 11.7–15.7)
IMM GRANULOCYTES # BLD: 0 10E3/UL
IMM GRANULOCYTES NFR BLD: 0 %
LYMPHOCYTES # BLD AUTO: 1.9 10E3/UL (ref 0.8–5.3)
LYMPHOCYTES NFR BLD AUTO: 49 %
MCH RBC QN AUTO: 29.9 PG (ref 26.5–33)
MCHC RBC AUTO-ENTMCNC: 32.3 G/DL (ref 31.5–36.5)
MCV RBC AUTO: 93 FL (ref 78–100)
MONOCYTES # BLD AUTO: 0.2 10E3/UL (ref 0–1.3)
MONOCYTES NFR BLD AUTO: 6 %
NEUTROPHILS # BLD AUTO: 1.6 10E3/UL (ref 1.6–8.3)
NEUTROPHILS NFR BLD AUTO: 41 %
PLATELET # BLD AUTO: 208 10E3/UL (ref 150–450)
POTASSIUM SERPL-SCNC: 4.6 MMOL/L (ref 3.4–5.3)
RBC # BLD AUTO: 4.51 10E6/UL (ref 3.8–5.2)
SODIUM SERPL-SCNC: 140 MMOL/L (ref 135–145)
WBC # BLD AUTO: 3.9 10E3/UL (ref 4–11)

## 2023-12-12 PROCEDURE — 80048 BASIC METABOLIC PNL TOTAL CA: CPT | Performed by: NURSE PRACTITIONER

## 2023-12-12 PROCEDURE — 85025 COMPLETE CBC W/AUTO DIFF WBC: CPT | Performed by: NURSE PRACTITIONER

## 2023-12-12 PROCEDURE — 99213 OFFICE O/P EST LOW 20 MIN: CPT | Performed by: NURSE PRACTITIONER

## 2023-12-12 PROCEDURE — 36415 COLL VENOUS BLD VENIPUNCTURE: CPT | Performed by: NURSE PRACTITIONER

## 2023-12-12 ASSESSMENT — PAIN SCALES - GENERAL: PAINLEVEL: NO PAIN (0)

## 2023-12-12 NOTE — PROGRESS NOTES
[facility]  [address] [facility] [address]  Phone: [phone]  Primary Provider: Melania eVga  Pre-op Performing Provider: MILTON DARBY      PREOPERATIVE EVALUATION:  Today's date: 12/12/2023    An is a 53 year old, presenting for the following:  Pre-Op Exam        12/12/2023     9:49 AM   Additional Questions   Roomed by juan cheek   Accompanied by self       Surgical Information:  Surgery/Procedure: Cold Knife Conization   Surgery Location: [facility]  Surgeon: Dr Del Angel  Surgery Date: 12/26/23  Time of Surgery: TBD  Where patient plans to recover: At home with family  Fax number for surgical facility: Note does not need to be faxed, will be available electronically in Epic.    Assessment & Plan     The proposed surgical procedure is considered LOW risk.    Preop general physical exam    - Basic metabolic panel  - CBC with Platelets & Differential    BARB III with severe dysplasia  -plans for cold knife conization           Risks and Recommendations:  The patient has the following additional risks and recommendations for perioperative complications:   - History of urinary retention after a previous surgery, but she believes that was due to hydrocodone.     Antiplatelet or Anticoagulation Medication Instructions:   - Patient is on no antiplatelet or anticoagulation medications.   - Bleeding risk is low for this procedure (e.g. dental, skin, cataract).    Additional Medication Instructions:  Patient does not take any daily scheduled medications.    RECOMMENDATION:  APPROVAL GIVEN to proceed with proposed procedure, without further diagnostic evaluation.    Ordering of each unique test      Subjective       HPI related to upcoming procedure: screening for cervical cancer, BARB 3 with severe dysplasia.           12/12/2023     9:48 AM   Preop Questions   1. Have you ever had a heart attack or stroke? No   2. Have you ever had surgery on your heart or  blood vessels, such as a stent placement, a coronary artery bypass, or surgery on an artery in your head, neck, heart, or legs? No   3. Do you have chest pain with activity? No   4. Do you have a history of  heart failure? No   5. Do you currently have a cold, bronchitis or symptoms of other infection? No   6. Do you have a cough, shortness of breath, or wheezing? No   7. Do you or anyone in your family have previous history of blood clots? YES - brother has had blood clots in his leg   8. Do you or does anyone in your family have a serious bleeding problem such as prolonged bleeding following surgeries or cuts? No   9. Have you ever had problems with anemia or been told to take iron pills? No   10. Have you had any abnormal blood loss such as black, tarry or bloody stools, or abnormal vaginal bleeding? No   11. Have you ever had a blood transfusion? No   12. Are you willing to have a blood transfusion if it is medically needed before, during, or after your surgery? Yes   13. Have you or any of your relatives ever had problems with anesthesia? No   14. Do you have sleep apnea, excessive snoring or daytime drowsiness? No   15. Do you have any artifical heart valves or other implanted medical devices like a pacemaker, defibrillator, or continuous glucose monitor? No   16. Do you have artificial joints? No   17. Are you allergic to latex? No   18. Is there any chance that you may be pregnant? No     Health Care Directive:  Patient has a Health Care Directive on file      Preoperative Review of :   reviewed - controlled substances reflected in medication list.      Review of Systems  CONSTITUTIONAL: NEGATIVE for fever, chills, change in weight  INTEGUMENTARY/SKIN: NEGATIVE for worrisome rashes, moles or lesions  EYES: NEGATIVE for vision changes or irritation  ENT/MOUTH: NEGATIVE for ear, mouth and throat problems  RESP: NEGATIVE for significant cough or SOB  CV: NEGATIVE for chest pain, palpitations or peripheral  edema  GI: NEGATIVE for nausea, abdominal pain, heartburn, or change in bowel habits  : NEGATIVE for frequency, dysuria, or hematuria  MUSCULOSKELETAL: NEGATIVE for significant arthralgias or myalgia  NEURO: NEGATIVE for weakness, dizziness or paresthesias  ENDOCRINE: NEGATIVE for temperature intolerance, skin/hair changes  HEME: NEGATIVE for bleeding problems  PSYCHIATRIC: NEGATIVE for changes in mood or affect    Patient Active Problem List    Diagnosis Date Noted    BARB III with severe dysplasia 12/04/2023     Priority: Medium    S/P LEEP 05/11/2021     Priority: Medium    Other irritable bowel syndrome 04/15/2021     Priority: Medium    Cervical high risk HPV (human papillomavirus) test positive 10/31/2016     Priority: Medium     10/31/16 [facility] Pap, + HR HPV 16 & other HR HPV. Plan colp  11/28/16 [place] ECC - benign. Plan cotest in 1 year.   12/6/17 [facility] Pap, + HR HPV 16 & other HR HPV. Plan colp  1/17/18 [place] no bx- Normal exam without visible pathology    Plan: Cotest in 1yr due by 12/6/18 12/18/18 [facility] pap, + HR HPV 16 and + HR HPV other. Plan colp.   1/7/19 [place] Bx BARB 2 ECC  HSIL. Plan LEEP  1/14/19 LEEP Bx Negative, ECC - Non-diagnostic. Plan cotest in 1 year.   3/2/20 NIL Pap, + HR HPV 16. Plan colp  5/11/20 [place] visually normal; no biopsies taken. Cotest due 3/2/21.   4/7/21 NIL pap, + HR HPV 16. [place] due by 7/7/21.   6/7/21 [place] Bx no BARB. Plan: cotest in 1 year  7/19/22 Lost to follow-up for pap tracking   10/25/23 [facility], +HR HPV 16. Plan: colposcopy due before 1/25/24 11/1/23 Pt notified      Rectocele 03/13/2014     Priority: Medium    KATIE (stress urinary incontinence, female) 03/13/2014     Priority: Medium    Asthma, exercise induced 04/05/2012     Priority: Medium    Exposure to pertussis 04/05/2012     Priority: Medium    Mild persistent asthma 10/06/2011     Priority: Medium    Anxiety 02/14/2011     Priority: Medium    CARDIOVASCULAR SCREENING; LDL GOAL LESS THAN 160 01/20/2011     Priority:  "Medium      Past Medical History:   Diagnosis Date    Abnormal Pap smear of cervix 2016, 2017, 12/18/18    See problem list    Anxiety     Use medication as needed    Asthma     Uses inhaler as needed    Cervical high risk HPV (human papillomavirus) test positive 2016 2017, 2018, 2020, 2021    Episodic tension type headache     Started when she was in her 30's intermittent    IUD     Mirena inserted 6/5/13    Moderate dysplasia of cervix 2019    see problem list    Neck pain     Both side of neck    Seizures (H) infancy    \"grew out of it\"    Status post colposcopy 11/28/2016    ECC - negative    Upper back pain     Over 5 years or more     Past Surgical History:   Procedure Laterality Date    BIOPSY  2001    BREAST LUMPECTOMY, RT/LT Left     Benign lesion as per patient    COLONOSCOPY  2017    COLPORRHAPHY POSTERIOR N/A 10/21/2014    Procedure: COLPORRHAPHY POSTERIOR;  Surgeon: Fabian Harding MD;  Location: [facility] OR    CYSTOSCOPY, SLING TRANSVAGINAL N/A 10/21/2014    Procedure: CYSTOSCOPY, SLING TRANSVAGINAL;  Surgeon: Fabian Harding MD;  [facility] OR    EXTRACTION(S) DENTAL      PERINEORRHAPHY N/A 10/21/2014    Procedure: PERINEORRHAPHY;  Surgeon: Fabian Harding MD;  [facility] OR    RECONSTRUCT BREAST BILATERAL       Current Outpatient Medications   Medication Sig Dispense Refill    albuterol (PROAIR HFA/PROVENTIL HFA/VENTOLIN HFA) 108 (90 Base) MCG/ACT inhaler Inhale 2 puffs into the lungs every 4 hours as needed for shortness of breath 18 g 3    ALPRAZolam (XANAX) 0.5 MG tablet Take 1 tablet (0.5 mg) by mouth nightly as needed for sleep 90 tablet 1    rizatriptan (MAXALT) 10 MG tablet Take 1 tablet (10 mg) by mouth at onset of headache for migraine May repeat in 2 hours. Max 3 tablets/24 hours. 18 tablet 1       Allergies   Allergen Reactions    Hydrocodone         Social History     Tobacco Use    Smoking status: Never    Smokeless tobacco: Never    Tobacco comments:     nonsmoking household " "  Substance Use Topics    Alcohol use: Yes     Comment: 2 glasses of gin and tonic may be once per month     Family History   Problem Relation Age of Onset    Depression Mother     Hypertension Mother     Lipids Mother     Arthritis Mother     Asthma Mother     Pulmonary Embolism Mother     Breast Cancer Mother     Diabetes Mother     Breast Cancer Maternal Grandmother         postmenopausal    Cancer Maternal Grandmother     Respiratory Paternal Grandfather         emphysema    Hypertension Father     Prostate Cancer Father     Cancer Sister         Thyroid    Neurologic Disorder Sister         MS     History   Drug Use Unknown         Objective     /75   Pulse 79   Temp 97.3  F (36.3  C) (Tympanic)   Resp 16   Ht 1.689 m (5' 6.5\")   Wt 64.4 kg (142 lb)   LMP  (LMP Unknown)   SpO2 100%   BMI 22.58 kg/m      Physical Exam    GENERAL APPEARANCE: healthy, alert and no distress     EYES: EOMI, PERRL     HENT: ear canals and TM's normal and nose and mouth without ulcers or lesions     NECK: no adenopathy, no asymmetry, masses, or scars and thyroid normal to palpation     RESP: lungs clear to auscultation - no rales, rhonchi or wheezes     CV: regular rates and rhythm, normal S1 S2, no S3 or S4 and no murmur, click or rub     ABDOMEN:  soft, nontender, no HSM or masses and bowel sounds normal     MS: extremities normal- no gross deformities noted, no evidence of inflammation in joints, FROM in all extremities.     SKIN: no suspicious lesions or rashes     NEURO: Normal strength and tone, sensory exam grossly normal, mentation intact and speech normal     PSYCH: mentation appears normal. and affect normal/bright     LYMPHATICS: No cervical adenopathy        Diagnostics:  Labs pending at this time.  Results will be reviewed when available.   No EKG required for low risk surgery (cataract, skin procedure, breast biopsy, etc).  No EKG required, no history of coronary heart disease, significant arrhythmia, " peripheral arterial disease or other structural heart disease.    Revised Cardiac Risk Index (RCRI):  The patient has the following serious cardiovascular risks for perioperative complications:   - No serious cardiac risks = 0 points     RCRI Interpretation: 0 points: Class I (very low risk - 0.4% complication rate)         Signed Electronically by: BRYAN Abbasi CNP  Copy of this evaluation report is provided to requesting physician.

## 2023-12-12 NOTE — PATIENT INSTRUCTIONS
Preparing for Your Surgery  Getting started  A nurse will call you to review your health history and instructions. They will give you an arrival time based on your scheduled surgery time. Please be ready to share:  Your doctor's clinic name and phone number  Your medical, surgical, and anesthesia history  A list of allergies and sensitivities  A list of medicines, including herbal treatments and over-the-counter drugs  Whether the patient has a legal guardian (ask how to send us the papers in advance)  Please tell us if you're pregnant--or if there's any chance you might be pregnant. Some surgeries may injure a fetus (unborn baby), so they require a pregnancy test. Surgeries that are safe for a fetus don't always need a test, and you can choose whether to have one.   If you have a child who's having surgery, please ask for a copy of Preparing for Your Child's Surgery.    Preparing for surgery  Within 10 to 30 days of surgery: Have a pre-op exam (sometimes called an H&P, or History and Physical). This can be done at a clinic or pre-operative center.  If you're having a , you may not need this exam. Talk to your care team.  At your pre-op exam, talk to your care team about all medicines you take. If you need to stop any medicines before surgery, ask when to start taking them again.  We do this for your safety. Many medicines can make you bleed too much during surgery. Some change how well surgery (anesthesia) drugs work.  Call your insurance company to let them know you're having surgery. (If you don't have insurance, call [phone].)  Call your clinic if there's any change in your health. This includes signs of a cold or flu (sore throat, runny nose, cough, rash, fever). It also includes a scrape or scratch near the surgery site.  If you have questions on the day of surgery, call your hospital or surgery center.  Eating and drinking guidelines  For your safety: Unless your surgeon tells you otherwise,  follow the guidelines below.  Eat and drink as usual until 8 hours before you arrive for surgery. After that, no food or milk.  Drink clear liquids until 2 hours before you arrive. These are liquids you can see through, like water, Gatorade, and Propel Water. They also include plain black coffee and tea (no cream or milk), candy, and breath mints. You can spit out gum when you arrive.  If you drink alcohol: Stop drinking it the night before surgery.  If your care team tells you to take medicine on the morning of surgery, it's okay to take it with a sip of water.  Preventing infection  Shower or bathe the night before and morning of your surgery. Follow the instructions your clinic gave you. (If no instructions, use regular soap.)  Don't shave or clip hair near your surgery site. We'll remove the hair if needed.  Don't smoke or vape the morning of surgery. You may chew nicotine gum up to 2 hours before surgery. A nicotine patch is okay.  Note: Some surgeries require you to completely quit smoking and nicotine. Check with your surgeon.  Your care team will make every effort to keep you safe from infection. We will:  Clean our hands often with soap and water (or an alcohol-based hand rub).  Clean the skin at your surgery site with a special soap that kills germs.  Give you a special gown to keep you warm. (Cold raises the risk of infection.)  Wear special hair covers, masks, gowns and gloves during surgery.  Give antibiotic medicine, if prescribed. Not all surgeries need antibiotics.  What to bring on the day of surgery  Photo ID and insurance card  Copy of your health care directive, if you have one  Glasses and hearing aids (bring cases)  You can't wear contacts during surgery  Inhaler and eye drops, if you use them (tell us about these when you arrive)  CPAP machine or breathing device, if you use them  A few personal items, if spending the night  If you have . . .  A pacemaker, ICD (cardiac defibrillator) or other  implant: Bring the ID card.  An implanted stimulator: Bring the remote control.  A legal guardian: Bring a copy of the certified (court-stamped) guardianship papers.  Please remove any jewelry, including body piercings. Leave jewelry and other valuables at home.  If you're going home the day of surgery  You must have a responsible adult drive you home. They should stay with you overnight as well.  If you don't have someone to stay with you, and you aren't safe to go home alone, we may keep you overnight. Insurance often won't pay for this.  After surgery  If it's hard to control your pain or you need more pain medicine, please call your surgeon's office.  Questions?   If you have any questions for your care team, list them here: _________________________________________________________________________________________________________________________________________________________________________ ____________________________________ ____________________________________ ____________________________________  For informational purposes only. Not to replace the advice of your health care provider. Copyright   2003, 2019 [employer]. All rights reserved. Clinically reviewed by Jocelyne Lin MD. [employer] 376703 - REV 12/22.    How to Take Your Medication Before Surgery  - Take all of your medications before surgery as usual

## 2023-12-20 ENCOUNTER — ANESTHESIA EVENT (OUTPATIENT)
Dept: SURGERY | Facility: AMBULATORY SURGERY CENTER | Age: 53
End: 2023-12-20
Payer: COMMERCIAL

## 2023-12-22 ENCOUNTER — PATIENT OUTREACH (OUTPATIENT)
Dept: FAMILY MEDICINE | Facility: CLINIC | Age: 53
End: 2023-12-22
Payer: COMMERCIAL

## 2023-12-26 ENCOUNTER — HOSPITAL ENCOUNTER (OUTPATIENT)
Facility: AMBULATORY SURGERY CENTER | Age: 53
Discharge: HOME | End: 2023-12-26
Attending: OBSTETRICS & GYNECOLOGY | Admitting: OBSTETRICS & GYNECOLOGY
Payer: COMMERCIAL

## 2023-12-26 ENCOUNTER — ANESTHESIA (OUTPATIENT)
Dept: SURGERY | Facility: AMBULATORY SURGERY CENTER | Age: 53
End: 2023-12-26
Payer: COMMERCIAL

## 2023-12-26 VITALS
OXYGEN SATURATION: 100 % | BODY MASS INDEX: 22.58 KG/M2 | DIASTOLIC BLOOD PRESSURE: 65 MMHG | SYSTOLIC BLOOD PRESSURE: 130 MMHG | TEMPERATURE: 97 F | RESPIRATION RATE: 16 BRPM | WEIGHT: 142 LBS

## 2023-12-26 LAB — HCG INTACT+B SERPL-ACNC: 3 MIU/ML

## 2023-12-26 PROCEDURE — 88307 TISSUE EXAM BY PATHOLOGIST: CPT | Performed by: PATHOLOGY

## 2023-12-26 PROCEDURE — G8916 PT W IV AB GIVEN ON TIME: HCPCS

## 2023-12-26 PROCEDURE — 84702 CHORIONIC GONADOTROPIN TEST: CPT | Performed by: OBSTETRICS & GYNECOLOGY

## 2023-12-26 PROCEDURE — G8907 PT DOC NO EVENTS ON DISCHARG: HCPCS

## 2023-12-26 PROCEDURE — G8918 PT W/O PREOP ORDER IV AB PRO: HCPCS

## 2023-12-26 PROCEDURE — 57520 CONIZATION OF CERVIX: CPT | Performed by: OBSTETRICS & GYNECOLOGY

## 2023-12-26 PROCEDURE — 57520 CONIZATION OF CERVIX: CPT

## 2023-12-26 RX ORDER — ACETAMINOPHEN 325 MG/1
975 TABLET ORAL ONCE
Status: COMPLETED | OUTPATIENT
Start: 2023-12-26 | End: 2023-12-26

## 2023-12-26 RX ORDER — BUPIVACAINE HYDROCHLORIDE AND EPINEPHRINE 5; 5 MG/ML; UG/ML
INJECTION, SOLUTION EPIDURAL; INTRACAUDAL; PERINEURAL PRN
Status: DISCONTINUED | OUTPATIENT
Start: 2023-12-26 | End: 2023-12-26 | Stop reason: HOSPADM

## 2023-12-26 RX ORDER — FENTANYL CITRATE 50 UG/ML
25 INJECTION, SOLUTION INTRAMUSCULAR; INTRAVENOUS
Status: DISCONTINUED | OUTPATIENT
Start: 2023-12-26 | End: 2023-12-27 | Stop reason: HOSPADM

## 2023-12-26 RX ORDER — SODIUM CHLORIDE, SODIUM LACTATE, POTASSIUM CHLORIDE, CALCIUM CHLORIDE 600; 310; 30; 20 MG/100ML; MG/100ML; MG/100ML; MG/100ML
INJECTION, SOLUTION INTRAVENOUS CONTINUOUS
Status: DISCONTINUED | OUTPATIENT
Start: 2023-12-26 | End: 2023-12-27 | Stop reason: HOSPADM

## 2023-12-26 RX ORDER — ACETIC ACID 5 %
LIQUID (ML) MISCELLANEOUS PRN
Status: DISCONTINUED | OUTPATIENT
Start: 2023-12-26 | End: 2023-12-26 | Stop reason: HOSPADM

## 2023-12-26 RX ORDER — CEFAZOLIN SODIUM 2 G/50ML
2 SOLUTION INTRAVENOUS SEE ADMIN INSTRUCTIONS
Status: DISCONTINUED | OUTPATIENT
Start: 2023-12-26 | End: 2023-12-27 | Stop reason: HOSPADM

## 2023-12-26 RX ORDER — ACETAMINOPHEN 325 MG/1
975 TABLET ORAL ONCE
Status: DISCONTINUED | OUTPATIENT
Start: 2023-12-26 | End: 2023-12-27 | Stop reason: HOSPADM

## 2023-12-26 RX ORDER — FENTANYL CITRATE 50 UG/ML
INJECTION, SOLUTION INTRAMUSCULAR; INTRAVENOUS PRN
Status: DISCONTINUED | OUTPATIENT
Start: 2023-12-26 | End: 2023-12-26

## 2023-12-26 RX ORDER — OXYCODONE HYDROCHLORIDE 5 MG/1
10 TABLET ORAL EVERY 4 HOURS PRN
Status: DISCONTINUED | OUTPATIENT
Start: 2023-12-26 | End: 2023-12-27 | Stop reason: HOSPADM

## 2023-12-26 RX ORDER — LIDOCAINE HYDROCHLORIDE 20 MG/ML
INJECTION, SOLUTION INFILTRATION; PERINEURAL PRN
Status: DISCONTINUED | OUTPATIENT
Start: 2023-12-26 | End: 2023-12-26

## 2023-12-26 RX ORDER — OXYCODONE HYDROCHLORIDE 5 MG/1
5 TABLET ORAL
Status: DISCONTINUED | OUTPATIENT
Start: 2023-12-26 | End: 2023-12-27 | Stop reason: HOSPADM

## 2023-12-26 RX ORDER — OXYCODONE HYDROCHLORIDE 5 MG/1
5 TABLET ORAL EVERY 4 HOURS PRN
Status: DISCONTINUED | OUTPATIENT
Start: 2023-12-26 | End: 2023-12-27 | Stop reason: HOSPADM

## 2023-12-26 RX ORDER — LIDOCAINE 40 MG/G
CREAM TOPICAL
Status: DISCONTINUED | OUTPATIENT
Start: 2023-12-26 | End: 2023-12-27 | Stop reason: HOSPADM

## 2023-12-26 RX ORDER — IBUPROFEN 400 MG/1
800 TABLET, FILM COATED ORAL ONCE
Status: DISCONTINUED | OUTPATIENT
Start: 2023-12-26 | End: 2023-12-27 | Stop reason: HOSPADM

## 2023-12-26 RX ORDER — CEFAZOLIN SODIUM 2 G/50ML
2 SOLUTION INTRAVENOUS
Status: COMPLETED | OUTPATIENT
Start: 2023-12-26 | End: 2023-12-26

## 2023-12-26 RX ORDER — ONDANSETRON 4 MG/1
4 TABLET, ORALLY DISINTEGRATING ORAL EVERY 30 MIN PRN
Status: DISCONTINUED | OUTPATIENT
Start: 2023-12-26 | End: 2023-12-27 | Stop reason: HOSPADM

## 2023-12-26 RX ORDER — ONDANSETRON 2 MG/ML
4 INJECTION INTRAMUSCULAR; INTRAVENOUS EVERY 30 MIN PRN
Status: DISCONTINUED | OUTPATIENT
Start: 2023-12-26 | End: 2023-12-27 | Stop reason: HOSPADM

## 2023-12-26 RX ORDER — PROPOFOL 10 MG/ML
INJECTION, EMULSION INTRAVENOUS CONTINUOUS PRN
Status: DISCONTINUED | OUTPATIENT
Start: 2023-12-26 | End: 2023-12-26

## 2023-12-26 RX ORDER — FERRIC SUBSULFATE 0.21 G/G
LIQUID TOPICAL PRN
Status: DISCONTINUED | OUTPATIENT
Start: 2023-12-26 | End: 2023-12-26 | Stop reason: HOSPADM

## 2023-12-26 RX ADMIN — PROPOFOL 20 MG: 10 INJECTION, EMULSION INTRAVENOUS at 10:03

## 2023-12-26 RX ADMIN — CEFAZOLIN SODIUM 2 G: 2 SOLUTION INTRAVENOUS at 09:44

## 2023-12-26 RX ADMIN — FENTANYL CITRATE 25 MCG: 50 INJECTION, SOLUTION INTRAMUSCULAR; INTRAVENOUS at 09:52

## 2023-12-26 RX ADMIN — PROPOFOL 150 MCG/KG/MIN: 10 INJECTION, EMULSION INTRAVENOUS at 09:50

## 2023-12-26 RX ADMIN — SODIUM CHLORIDE, SODIUM LACTATE, POTASSIUM CHLORIDE, CALCIUM CHLORIDE: 600; 310; 30; 20 INJECTION, SOLUTION INTRAVENOUS at 09:13

## 2023-12-26 RX ADMIN — ACETAMINOPHEN 975 MG: 325 TABLET ORAL at 08:56

## 2023-12-26 RX ADMIN — LIDOCAINE HYDROCHLORIDE 60 MG: 20 INJECTION, SOLUTION INFILTRATION; PERINEURAL at 09:52

## 2023-12-26 RX ADMIN — PROPOFOL 70 MG: 10 INJECTION, EMULSION INTRAVENOUS at 09:53

## 2023-12-26 NOTE — OP NOTE
Surgeon:  Stan Del Angel  Assistants: [facility] OR staff  Anesthesia:Local and IV sedation  Procedure COLD KNIFE CONIZATION   Findings:  No ectocervical lesion    Description of Operative Procedure:  Patient was taken to the operating room and placed in dorsal lithotomy position and prepped and draped in a sterile fashion, no internal prep was performed.   Anesthesia was induced.  The cervix was visualized, and was infiltrated with local anesthetic.  Stay sutures of 0 Vicryl were placed at 3 and 9 o'clock.  The cervix was painted with Lugol's solution.  A negative uptake area is noted.  Using a cone scalpel, a circumferential incision was made around the area of negative uptake.  This incision is then continued to a point up the endocervical canal.  The cone biopsy is removed and tagged with silk suture at the 12 o'clock position.  Cautery is then used to achieve hemostasis.  Monsel's solution is then place on the cone bed.  Good hemostasis is noted.   Patient was taken to the recovery room in stable condition.  The patient tolerated the procedure well.  Sponge and needle counts were correct X2.  EBL: 10 cc  Specimen:  Cone biopsy of Cervix  Implants:  none  Complications: none

## 2023-12-26 NOTE — ANESTHESIA POSTPROCEDURE EVALUATION
Patient: An Cornejo    Procedure: Procedure(s):  Cold Knife Conization       Anesthesia Type:  MAC    Note:  Disposition: Outpatient   Postop Pain Control: Uneventful            Sign Out: Well controlled pain   PONV: No   Neuro/Psych: Uneventful            Sign Out: Acceptable/Baseline neuro status   Airway/Respiratory: Uneventful            Sign Out: Acceptable/Baseline resp. status   CV/Hemodynamics: Uneventful            Sign Out: Acceptable CV status; No obvious hypovolemia; No obvious fluid overload   Other NRE: NONE   DID A NON-ROUTINE EVENT OCCUR? No       Last vitals:  Vitals Value Taken Time   /60 12/26/23 1023   Temp 97.3  F (36.3  C) 12/26/23 1023   Pulse     Resp 16 12/26/23 1023   SpO2 100 % 12/26/23 1023       Electronically Signed By: Chapito Bland MD  December 26, 2023  10:52 AM

## 2023-12-26 NOTE — ANESTHESIA PREPROCEDURE EVALUATION
"Anesthesia Pre-Procedure Evaluation    Patient: An Cornejo   MRN: [id number] : 1970        Procedure : Procedure(s):  Cold Knife Conization          Past Medical History:   Diagnosis Date     Abnormal Pap smear of cervix , , 17    See problem list     Anxiety     Use medication as needed     Asthma     Uses inhaler as needed     Cervical high risk HPV (human papillomavirus) test positive 2016, , ,      Episodic tension type headache     Started when she was in her 30's intermittent     IUD     Mirena inserted 12     Moderate dysplasia of cervix     see problem list     Neck pain     Both side of neck     Seizures (H) infancy    \"grew out of it\"     Status post colposcopy 2016    ECC - negative     Upper back pain     Over 5 years or more      Past Surgical History:   Procedure Laterality Date     BIOPSY       BREAST LUMPECTOMY, RT/LT Left     Benign lesion as per patient     COLONOSCOPY       COLPORRHAPHY POSTERIOR N/A 10/21/2014    Procedure: COLPORRHAPHY POSTERIOR;  Surgeon: Fabian Harding MD;  Location: [facility] OR     CYSTOSCOPY, SLING TRANSVAGINAL N/A 10/21/2014    Procedure: CYSTOSCOPY, SLING TRANSVAGINAL;  Surgeon: Fabian Harding MD;  Location: [facility] OR     EXTRACTION(S) DENTAL       PERINEORRHAPHY N/A 10/21/2014    Procedure: PERINEORRHAPHY;  Surgeon: Fabian Harding MD;  Location: [facility] OR     RECONSTRUCT BREAST BILATERAL        Allergies   Allergen Reactions     Hydrocodone       Social History     Tobacco Use     Smoking status: Never     Smokeless tobacco: Never     Tobacco comments:     nonsmoking household   Substance Use Topics     Alcohol use: Yes     Comment: 2 glasses of gin and tonic may be once per month      Wt Readings from Last 1 Encounters:   23 64.4 kg (142 lb)        Anesthesia Evaluation   Pt has had prior anesthetic. Type: MAC.        ROS/MED HX  ENT/Pulmonary:     (+)                     Intermittent, asthma  " "Treatment: Inhaler prn,                 Neurologic:  - neg neurologic ROS     Cardiovascular:  - neg cardiovascular ROS     METS/Exercise Tolerance:     Hematologic:  - neg hematologic  ROS     Musculoskeletal:  - neg musculoskeletal ROS     GI/Hepatic:  - neg GI/hepatic ROS     Renal/Genitourinary:  - neg Renal ROS     Endo:  - neg endo ROS     Psychiatric/Substance Use:     (+) psychiatric history anxiety       Infectious Disease:  - neg infectious disease ROS     Malignancy:  - neg malignancy ROS     Other:  - neg other ROS        Physical Exam    Airway  airway exam normal           Respiratory Devices and Support         Dental       (+) Completely normal teeth      Cardiovascular   cardiovascular exam normal          Pulmonary   pulmonary exam normal            OUTSIDE LABS:  CBC:   Lab Results   Component Value Date    WBC 3.9 (L) 12/12/2023    WBC 5.9 11/23/2021    HGB 13.5 12/12/2023    HGB 13.0 11/23/2021    HCT 41.8 12/12/2023    HCT 39.3 11/23/2021     12/12/2023     11/23/2021     BMP:   Lab Results   Component Value Date     12/12/2023     11/23/2021    POTASSIUM 4.6 12/12/2023    POTASSIUM 3.8 11/23/2021    CHLORIDE 102 12/12/2023    CHLORIDE 104 11/23/2021    CO2 29 12/12/2023    CO2 28 11/23/2021    BUN 10.8 12/12/2023    BUN 24 11/23/2021    CR 0.83 12/12/2023    CR 0.82 11/23/2021    GLC 90 12/12/2023    GLC 90 10/25/2023     COAGS: No results found for: \"PTT\", \"INR\", \"FIBR\"  POC:   Lab Results   Component Value Date    HCG Negative 10/21/2014     HEPATIC:   Lab Results   Component Value Date    ALBUMIN 4.4 11/23/2021    PROTTOTAL 7.6 11/23/2021    ALT 21 11/23/2021    AST 17 11/23/2021    ALKPHOS 60 11/23/2021    BILITOTAL 0.6 11/23/2021     OTHER:   Lab Results   Component Value Date    ALBERTO 9.7 12/12/2023    TSH 2.57 03/02/2020    CRP <2.9 10/28/2016    SED 5 10/28/2016       Anesthesia Plan    ASA Status:  2    NPO Status:  NPO Appropriate    Anesthesia Type: MAC.     " - Reason for MAC: straight local not clinically adequate   Induction: Intravenous, Propofol.   Maintenance: TIVA.        Consents    Anesthesia Plan(s) and associated risks, benefits, and realistic alternatives discussed. Questions answered and patient/representative(s) expressed understanding.     - Discussed:     - Discussed with:  Patient      - Extended Intubation/Ventilatory Support Discussed: No.      - Patient is DNR/DNI Status: No     Use of blood products discussed: No .     Postoperative Care    Pain management: IV analgesics, Oral pain medications.   PONV prophylaxis: Ondansetron (or other 5HT-3), Background Propofol Infusion     Comments:             Chapito Bland MD    I have reviewed the pertinent notes and labs in the chart from the past 30 days and (re)examined the patient.  Any updates or changes from those notes are reflected in this note.

## 2023-12-26 NOTE — DISCHARGE INSTRUCTIONS
Gynecology Outpatient Post-operative Instructions    1.Dressing (if present) may be removed tomorrow.  Leave incision uncovered.    2.You may shower as normal tomorrow.    3. You may eat as tolerated today.    4. You may take ibuprofen over the counter as tolerated.    5. Tomorrow, lifting and physical activity as tolerated.    6.  You may drive when you are no longer requiring narcotic pain medication.    7. You may return to work/school when you feel able.    8.  Please contact my office with any problems.    9.  I will touch base after the pathology comes back.  Tylenol 975 mg was given at 9 am.    You should not take more then 4,000 mg of tylenol/acetaminophen in a 24 hour period.

## 2023-12-26 NOTE — ANESTHESIA CARE TRANSFER NOTE
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    Anesthesia Type:   MAC     Note:    Oropharynx: oropharynx clear of all foreign objects and spontaneously breathing  Level of Consciousness: awake and drowsy  Oxygen Supplementation: room air    Independent Airway: airway patency satisfactory and stable  Dentition: dentition unchanged  Vital Signs Stable: post-procedure vital signs reviewed and stable  Report to RN Given: handoff report given  Patient transferred to: Phase II    Handoff Report: Identifed the Patient, Identified the Reponsible Provider, Reviewed the pertinent medical history, Discussed the surgical course, Reviewed Intra-OP anesthesia mangement and issues during anesthesia, Set expectations for post-procedure period and Allowed opportunity for questions and acknowledgement of understanding      Vitals:  Vitals Value Taken Time   /60 12/26/23 1023   Temp 97.3  F (36.3  C) 12/26/23 1023   Pulse     Resp 16 12/26/23 1023   SpO2 100 % 12/26/23 1023       Electronically Signed By: BRYAN Hernandez CRNA  December 26, 2023  10:25 AM

## 2023-12-29 LAB
PATH REPORT.COMMENTS IMP SPEC: NORMAL
PATH REPORT.COMMENTS IMP SPEC: NORMAL
PATH REPORT.FINAL DX SPEC: NORMAL
PATH REPORT.GROSS SPEC: NORMAL
PATH REPORT.MICROSCOPIC SPEC OTHER STN: NORMAL
PATH REPORT.RELEVANT HX SPEC: NORMAL
PHOTO IMAGE: NORMAL

## 2024-01-02 ENCOUNTER — OFFICE VISIT (OUTPATIENT)
Dept: DERMATOLOGY | Facility: CLINIC | Age: 54
End: 2024-01-02
Payer: COMMERCIAL

## 2024-01-02 ENCOUNTER — PRE VISIT (OUTPATIENT)
Dept: DERMATOLOGY | Facility: CLINIC | Age: 54
End: 2024-01-02

## 2024-01-02 DIAGNOSIS — C44.519 BCC (BASAL CELL CARCINOMA), TRUNK: Primary | ICD-10-CM

## 2024-01-02 PROCEDURE — 99203 OFFICE O/P NEW LOW 30 MIN: CPT | Performed by: DERMATOLOGY

## 2024-01-02 ASSESSMENT — PAIN SCALES - GENERAL: PAINLEVEL: NO PAIN (0)

## 2024-01-02 NOTE — PROGRESS NOTES
Dermatologic Surgery Consultation Note    Dermatology Problem List:  # BCC, chest, s/p bx 11/16/23    CC: mohs consult (BCC on chest. )      Subjective: An Cornejo is a 53 year old female who presents today for Mohs micrographic surgery consultation for a recent diagnosis of skin cancer.  - Skin cancer(s): BCC  - Location(s): Chest    - no other concerns today         Objective: An exam of the chest was performed today   - 1.2 cm biopsy scar on chest.    Path report:   B. Right upper chest skin lesion:  - Basal cell carcinoma extending to radial margin    Assessment and Plan:     1. Plan for Mohs micrographic surgery for untyped BCC above:  *Review lab result(s): Dermpath report   - We discussed the nature of the diagnosis/condition above. We discussed the treatment options, including the risks benefits and expectations of these options. We recommend micrographic surgery as the most effective and most tissue sparing option for treatment, and the patient agrees to proceed with this.  The patient is aware of the risks, benefits and expectations of this procedure. The patient will be scheduled for this procedure, if not already done so.  - We anticipate the following closure type: Linear closure, such as complex linear closure (CLC)    Patient was discussed with and evaluated by attending physician Dr. Harvey.    Scribe Disclosure:   I, JEROD NAVA, am serving as a scribe; to document services personally performed by Clarence Harvey MD -based on data collection and the provider's statements to me.    Attending Attestation  I attest that the Scribe recorded the interview and exam that I personally performed.  I have reviewed the note and edited it as necessary.    Clarence Harvey M.D.  Professor  Director of Dermatologic Surgery  Department of Dermatology  [facility]

## 2024-01-02 NOTE — LETTER
1/2/2024       RE: An Cornejo  [address]  [facility] [address]     Dear Colleague,    Thank you for referring your patient, An Cornejo, to the [facility] [place] at [facility]. Please see a copy of my visit note below.    Dermatologic Surgery Consultation Note    Dermatology Problem List:  # BCC, chest, s/p bx 11/16/23    CC: mohs consult (BCC on chest. )      Subjective: An Cornejo is a 53 year old female who presents today for Mohs micrographic surgery consultation for a recent diagnosis of skin cancer.  - Skin cancer(s): BCC  - Location(s): Chest    - no other concerns today         Objective: An exam of the chest was performed today   - 1.2 cm biopsy scar on chest.    Path report:   B. Right upper chest skin lesion:  - Basal cell carcinoma extending to radial margin    Assessment and Plan:     1. Plan for Mohs micrographic surgery for untyped BCC above:  *Review lab result(s): Dermpath report   - We discussed the nature of the diagnosis/condition above. We discussed the treatment options, including the risks benefits and expectations of these options. We recommend micrographic surgery as the most effective and most tissue sparing option for treatment, and the patient agrees to proceed with this.  The patient is aware of the risks, benefits and expectations of this procedure. The patient will be scheduled for this procedure, if not already done so.  - We anticipate the following closure type: Linear closure, such as complex linear closure (CLC)    Patient was discussed with and evaluated by attending physician Dr. Harvey.    Scribe Disclosure:   I, JEROD NAVA, am serving as a scribe; to document services personally performed by Clarence Harvey MD -based on data collection and the provider's statements to me.    Attending Attestation  I attest that the Scribe recorded the interview and exam that I  personally performed.  I have reviewed the note and edited it as necessary.    Clarence Harvey M.D.  Professor  Director of Dermatologic Surgery  Department of Dermatology  [facility]

## 2024-01-18 ENCOUNTER — PATIENT OUTREACH (OUTPATIENT)
Dept: OBGYN | Facility: CLINIC | Age: 54
End: 2024-01-18
Payer: COMMERCIAL

## 2024-01-22 ENCOUNTER — OFFICE VISIT (OUTPATIENT)
Dept: DERMATOLOGY | Facility: CLINIC | Age: 54
End: 2024-01-22
Payer: COMMERCIAL

## 2024-01-22 VITALS — SYSTOLIC BLOOD PRESSURE: 119 MMHG | HEART RATE: 81 BPM | DIASTOLIC BLOOD PRESSURE: 78 MMHG

## 2024-01-22 DIAGNOSIS — C44.519 BASAL CELL CARCINOMA (BCC) OF CHEST: ICD-10-CM

## 2024-01-22 PROCEDURE — 17313 MOHS 1 STAGE T/A/L: CPT | Mod: GC | Performed by: DERMATOLOGY

## 2024-01-22 PROCEDURE — 12032 INTMD RPR S/A/T/EXT 2.6-7.5: CPT | Mod: GC | Performed by: DERMATOLOGY

## 2024-01-22 NOTE — PATIENT INSTRUCTIONS
Wound care    I will experience scar, bleeding, swelling, pain, crusting and redness. I may experience incomplete removal or recurrence. Risks are bleeding, bruising, swelling, infection, nerve damage, & a large wound. A second procedure may be recommended to obtain the best cosmetic or functional result.       A three month office visit with your Surgeon is recommended for scar evaluation. Please reach out sooner if you have concerns about you surgical site/wound.    Caring for your skin after surgery    After your surgery, a pressure bandage will be placed over the area that has stitches. This is important to prevent bleeding. Please follow these instructions over the next 1 to 2 weeks. Following this regimen will help to prevent complications as your wound heals.     For the first 48 hours after your surgery:    Leave the pressure dressing on and keep it dry. If it should come loose, you may re-tape it, but do not take it off.  Relax and take it easy. Do not do any vigorous exercise or heavy lifting. This could cause the wound to bleed.  Post-Operative pain is usually mild. If you are able to take tylenol, You may take plain or extra-strength Tylenol (acetaminophen) As directed on the bottle (do not take more than 4,000mg in one day). If you are able to take ibuprofen, you can alternate the tylenol and ibuprofen.   Avoid alcohol as this may increase your tendency to bleed.   You may put an ice pack around the bandaged area for 20 minutes at a time as needed. This may help reduce swelling, bruising, and pain. Make sure the ice pack is waterproof so that the pressure bandage doesn t get wet.  If the wound is on the face try to sleep with your head elevated. Either in a recliner or propped up in bed, this will decrease swelling around the eyes.   You may see a small amount of drainage or blood on your pressure bandage. This is normal. However:  If drainage or bleeding continues or saturates the bandage, you will  "need to apply firm pressure over the bandage with a piece of gauze for 15 minutes.  If bleeding continues after applying pressure for 15 minutes, apply an ice pack to the bandaged area for 15 minutes.  If bleeding still continues, call our office or go to the nearest emergency room.    Remove pressure dressing 48 hours after surgery:    Carefully remove the pressure bandage. If it seems sticky or too difficult to get off, you may need to soak it off in the shower.  After the pressure dressing is removed, you may shower and get the wound wet. However, Do Not let the forceful stream of the shower hit the wound directly.  Follow these wound care and dressing change instructions:    You have steri strips, skin glue (purplish/clear), and tegaderm (clear film) over your incision line, you can shower.   You may allow water to run over the site. Do not soak.  Do Not rub or scrub the site.  Pat dry after the shower or bath.  Avoid topical medications, lotions, creams, ointment,or oils.  Do not use tanning lamps or expose the site to sun.   Check wound appearance daily, some swelling and redness is normal after a procedure but should go away as your would is healing. If the swelling and redness or pain increases or if any other signs of infection occur listed below please send in a photo via my chart and or call us to let us know.  The clear film should start peeling off approximately around 2 weeks. By this time your wound should be sufficiently healed. If it still looks to be healing when the glue comes off you can clean the wound with soapy warm water daily in the shower and apply Vaseline to the incision line.   Once the clear film starts peeling off to the point where water is getting trapped under the clear film, please gently peel off.        If you are able to take acetaminophen (\"Tylenol,\" etc.) and ibuprofen (\"Advil\" or \"Motrin,\" etc.), then you may STAGGER these medications by taking 400 mg of ibuprofen (usually " two tabs) every 8 hours and 1,000 mg of acetaminophen (e.g., two tabs of extra-strength Tylenol) every 8 hours.    This means, for example, that you could take the followin,000 mg of Tylenol, followed 4 hours later by 400 mg Ibuprofen, followed 4 hours later with 1,000 mg of Tylenol, followed 4 hours later by 400 mg Ibuprofen, followed 4 hours later with 1,000 mg of Tylenol, and so forth.     Essentially, you can take either 1,000 mg of Tylenol or 400 mg of ibuprofen in alternating fashion EVERY FOUR HOURS.    Do NOT exceed more than 4,000 mg of Tylenol or 3,200 mg of ibuprofen per 24 hours. If you are not able to take Tylenol or ibuprofen as above due to other health issues (or a physician has told you directly that you are not allowed to take one of them, say due to pre-existing severe liver or kidney issues), then disregard the above directions.    Scientific evidence supports that this combination/schedule of pain medications is just as effective, if not more effective, than taking a narcotic pain medicine.       Follow up will be a 3 month scar evaluation either in person or via a telephone visit with you sending in a photo via Tymphany. Unless you have been told to follow up sooner or if you have concerns and would like to be see sooner. Please call or send us in a Tymphany message if possible and attach a photo.        What to expect:    The first couple of days your wound may be tender and may bleed slightly when doing wound care.  There may be swelling and bruising around the wound, especially if it is near the eyes. For your comfort, you may apply ice or cold compresses to the bruises after your have removed the pressure bandage.  The area around your wound may be numb for several weeks or even months.  You may experience periodic sharp pain or mild itching around the wound as it heals.   The suture line will look dark for a while but will lighten over time.       When to call us:    You have bleeding  that will not stop after applying pressure and ice.  You have pain that is not controlled with Tylenol (acetaminophen.)  You have signs or symptoms of an infection such as:  Fever over 100 degrees Fahrenheit  Redness, warmth or foul-smelling drainage from the wound  If you have any questions, or are not sure how to take care of the wound.    Phone numbers:    During business hours (M-F 8:00-4:30 p.m.)  [facility]-  [phone] Option 1 appt. desk  [phone]  Option 3 nurse triage line  ---------------------------------------------------------  Evenings/Weekends/Holidays  Hospital - [phone]   TTY for hearing gtketcci-[phone]  *Ask  to page dermatologist on-call  Emergency Ktkb-[phone]  TTY for hearing impaired- [phone]

## 2024-01-22 NOTE — LETTER
1/22/2024       RE: An Cornejo  [address]  [facility] [address]     Dear Colleague,    Thank you for referring your patient, An Cornejo, to the [facility] DERMATOLOGIC SURGERY [facility]INIC [place] at [facility]. Please see a copy of my visit note below.    [facility]atologic Surgery [facility] [place] Procedure Note      Date of Service:  Jan 22, 2024  Surgery: Mohs micrographic surgery    Case 1  Repair Type: Intermediate  Repair Size: 3.2 cm  Suture Material: 4-0 Monocryl   Tumor Type: BCC  Location: Right upper chest   Derm-Path Accession #: [id number]   PreOp Size: 0.6 x 0.7 cm  PostOp Size: 1.8 x 1.5 cm  Mohs Accession #: [id number]  Level of Defect: fat      Procedure:  We discussed the principles of treatment and most likely complications including scarring, bleeding, infection, swelling, pain, crusting, nerve damage, large wound,  incomplete excision, wound dehiscence,  nerve damage, recurrence, and a second procedure may be recommended to obtain the best cosmetic or functional result.    Informed consent was obtained and the patient underwent the procedure as follows:  The patient was placed supine on the operating table.  The cancer was identified, outlined with a marker, and verified by the patient.  The entire surgical field was prepped with Hibiclens.  The surgical site was anesthetized using 1% lido with Epi.      The area of clinically apparent tumor was not debulked. The layer of tissue was then surgically excised using a #15 blade and was then transferred onto a specimen sheet maintaining the orientation of the specimen. Hemostasis was obtained using hyfrecator. The wound site was then covered with a dressing while the tissue samples were processed for examination.    The excised [facility] maintaining the tissue orientation.  The tissue specimen was relaxed so that the entire  surgical margin was in a a single horizontal plane for sectioning and inked for precise mapping.  A precise reference map was drawn to reflect the sectioning of the specimen, colored inking of the margins, and orientation on the patient. The tissue was processed using horizontal sectioning of the base and continuous peripheral margins.  The histopathologic sections were reviewed in conjunction with the reference map.    Total blocks: 1    Total slides:  2    There were no cancer cells visualized on examination, therefore Mohs surgery was complete.    Reconstruction: Intermediate Linear Closure      The patient was taken to the operative suite and placed supine on the operating room table.  The defect was identified.  Appropriate markings were made with a marking pen to plan the repair.  The area was infiltrated with Lidocaine 1% with epi 1:100,000 and prepped with Hibiclens and draped with sterile towels.     The wound was debeveled and undermined widely.  Cones were excised within relaxed skin tension lines on both sides of the defect.  Hemostasis was obtained using electrofulguration.  Subcutaneous tissues were then approximated using 4.0 Monocrcyl buried vertical mattress sutures.  The wound edges were then approximated additional  buried sutures were placed in a similar fashion where needed.  Running subcuticular 4-0 Monocryl sutures were carefully placed for maximum eversion and meticulous approximation.    Repair Size: 3.2 cm    The wound was cleansed with saline and ointment was applied along the wound surface.     A sterile pressure dressing was applied.  Wound care instructions were given verbally and in writing.  The patient left the operating suite in stable condition.      Dr. Tori Torres (Mohs micrographic surgery fellow) performed the Mohs micrographic surgery and reconstruction under the direct supervision of Clarence Harvey MD, who was present for the entire micrographic surgery and key portions of  the reconstruction, and always immediately available.    Suture removal: N/A (all dissolving sutures used)    Follow-up with dermatologic surgery: PRN (patient moving to [place] in the next couple weeks). Encouraged patient to establish care with a dermatologist for skin checks down in [place].     Tori Torres MD  Micrographic Surgery and Dermatologic Oncology (MSDO) Fellow, PGY-5    Scribe Disclosure:   I, FRANCOISE KAMINSKI, am serving as a scribe; to document services personally performed by Clarence Harvey MD -based on data collection and the provider's statements to me.     Provider Disclosure:  I agree with above History, Review of Systems, Physical exam and Plan.  I have reviewed the content of the documentation and have edited it as needed. I have personally performed the services documented here and the documentation accurately represents those services and the decisions I have made.      Electronically signed by:    Attending attestation:  I was present for key elements of the procedure and immediately available for all other portions of the procedure.  I have reviewed the note and edited it as necessary.    Clarence Harvey M.D.  Professor  Director of Dermatologic Surgery  Department of Dermatology  [facility]    Dermatology Surgery Clinic  [facility]  [address] [place], [place] [id number]

## 2024-01-22 NOTE — NURSING NOTE
Chief Complaint   Patient presents with    Skin Cancer     Mohs chest     Ryann ANDRADE, RN-BSN  Dermatology Surgery  [phone]

## 2024-01-22 NOTE — PROGRESS NOTES
[facility] Dermatologic Surgery Clinic [place] Procedure Note      Date of Service:  Jan 22, 2024  Surgery: Mohs micrographic surgery    Case 1  Repair Type: Intermediate  Repair Size: 3.2 cm  Suture Material: 4-0 Monocryl   Tumor Type: BCC  Location: Right upper chest   Derm-Path Accession #: [id number]   PreOp Size: 0.6 x 0.7 cm  PostOp Size: 1.8 x 1.5 cm  Mohs Accession #: [id number]  Level of Defect: fat      Procedure:  We discussed the principles of treatment and most likely complications including scarring, bleeding, infection, swelling, pain, crusting, nerve damage, large wound,  incomplete excision, wound dehiscence,  nerve damage, recurrence, and a second procedure may be recommended to obtain the best cosmetic or functional result.    Informed consent was obtained and the patient underwent the procedure as follows:  The patient was placed supine on the operating table.  The cancer was identified, outlined with a marker, and verified by the patient.  The entire surgical field was prepped with Hibiclens.  The surgical site was anesthetized using 1% lido with Epi.      The area of clinically apparent tumor was not debulked. The layer of tissue was then surgically excised using a #15 blade and was then transferred onto a specimen sheet maintaining the orientation of the specimen. Hemostasis was obtained using hyfrecator. The wound site was then covered with a dressing while the tissue samples were processed for examination.    The excised tissue was transported to the [facility]s histology laboratory maintaining the tissue orientation.  The tissue specimen was relaxed so that the entire surgical margin was in a a single horizontal plane for sectioning and inked for precise mapping.  A precise reference map was drawn to reflect the sectioning of the specimen, colored inking of the margins, and orientation on the patient. The tissue was processed using horizontal sectioning of the base and continuous peripheral  margins.  The histopathologic sections were reviewed in conjunction with the reference map.    Total blocks: 1    Total slides:  2    There were no cancer cells visualized on examination, therefore Mohs surgery was complete.    Reconstruction: Intermediate Linear Closure      The patient was taken to the operative suite and placed supine on the operating room table.  The defect was identified.  Appropriate markings were made with a marking pen to plan the repair.  The area was infiltrated with Lidocaine 1% with epi 1:100,000 and prepped with Hibiclens and draped with sterile towels.     The wound was debeveled and undermined widely.  Cones were excised within relaxed skin tension lines on both sides of the defect.  Hemostasis was obtained using electrofulguration.  Subcutaneous tissues were then approximated using 4.0 Monocrcyl buried vertical mattress sutures.  The wound edges were then approximated additional  buried sutures were placed in a similar fashion where needed.  Running subcuticular 4-0 Monocryl sutures were carefully placed for maximum eversion and meticulous approximation.    Repair Size: 3.2 cm    The wound was cleansed with saline and ointment was applied along the wound surface.     A sterile pressure dressing was applied.  Wound care instructions were given verbally and in writing.  The patient left the operating suite in stable condition.      Dr. Tori Torres (Mohs micrographic surgery fellow) performed the Mohs micrographic surgery and reconstruction under the direct supervision of Clarence Harvey MD, who was present for the entire micrographic surgery and key portions of the reconstruction, and always immediately available.    Suture removal: N/A (all dissolving sutures used)    Follow-up with dermatologic surgery: PRN (patient moving to [place] in the next couple weeks). Encouraged patient to establish care with a dermatologist for skin checks down in [place].     Tori Torres MD  Micrographic  Surgery and Dermatologic Oncology (MSDO) Fellow, PGY-5    Scribe Disclosure:   I, FRANCOISE PULIDONET, am serving as a scribe; to document services personally performed by Clarence Harvey MD -based on data collection and the provider's statements to me.     Provider Disclosure:  I agree with above History, Review of Systems, Physical exam and Plan.  I have reviewed the content of the documentation and have edited it as needed. I have personally performed the services documented here and the documentation accurately represents those services and the decisions I have made.      Electronically signed by:    Attending attestation:  I was present for key elements of the procedure and immediately available for all other portions of the procedure.  I have reviewed the note and edited it as necessary.    Clarence Harvey M.D.  Professor  Director of Dermatologic Surgery  Department of Dermatology  [facility]    Dermatology Surgery Clinic  [facility]  [address] [place] [id number]

## 2024-01-23 ENCOUNTER — TELEPHONE (OUTPATIENT)
Dept: DERMATOLOGY | Facility: CLINIC | Age: 54
End: 2024-01-23
Payer: COMMERCIAL

## 2024-01-23 NOTE — TELEPHONE ENCOUNTER
Follow up call attempted following Mohs procedure with JOAO Banuelos      Are you having pain?   Are you taking pain medication?   Are you applying ice?    Have you had any noticeable bleeding through the bandage?    Do you have any other concerns?          Please call [phone] option [phone] if you have any questions or concerns.

## 2024-04-11 ENCOUNTER — PATIENT OUTREACH (OUTPATIENT)
Dept: FAMILY MEDICINE | Facility: CLINIC | Age: 54
End: 2024-04-11
Payer: COMMERCIAL

## 2024-04-29 NOTE — PATIENT INSTRUCTIONS
If you have any questions regarding your visit, Please contact your care team.     [email or web address] Services: [phone]    To Schedule an Appointment 24/7  Call: [phone]     Stan Del Angel MD  Medical Director        Vanessa-EVENS Cuenca-RN  Sara Brown-Surgery Scheduler  Lilliam-Surgery Scheduler               Tuesday-[place]  7:30 a.m-4:30 p.m    Thursday-[place]  7:30 a.m-4:30 p.m    Typical Surgery Days: Tuesday or Friday [facility]  [address] [place] [address]  PH: [phone]    Imaging Scheduling all locations  PH: [phone]     [facility]  [address] [place] [address] [place] [address]  PH: [phone]    [facility]  [address] [place] [address]  PH: [phone] Fax      **Surgeries** Our Surgery Schedulers will contact you to schedule. If you do not receive a call within 3 business days, please call [phone].    Urgent Care locations:  [facility] Monday-Friday  10 am - 8 pm  Saturday and Sunday   9 am - 5 pm  Monday-Friday   10 am - 8 pm  Saturday and Sunday   9 am - 5 pm   [phone]   If you need a medication refill, please contact your pharmacy. Please allow 3 business days for your refill to be completed.  As always, Thank you for trusting us with your healthcare needs!    see additional instructions from your care team below    
no clubbing/no cyanosis/no pedal edema

## 2024-05-02 ENCOUNTER — OFFICE VISIT (OUTPATIENT)
Dept: OBGYN | Facility: CLINIC | Age: 54
End: 2024-05-02
Payer: COMMERCIAL

## 2024-05-02 VITALS
OXYGEN SATURATION: 98 % | HEART RATE: 89 BPM | WEIGHT: 143 LBS | BODY MASS INDEX: 22.74 KG/M2 | DIASTOLIC BLOOD PRESSURE: 74 MMHG | SYSTOLIC BLOOD PRESSURE: 107 MMHG

## 2024-05-02 DIAGNOSIS — D06.9 CIN III WITH SEVERE DYSPLASIA: Primary | ICD-10-CM

## 2024-05-02 PROCEDURE — 87624 HPV HI-RISK TYP POOLED RSLT: CPT | Performed by: OBSTETRICS & GYNECOLOGY

## 2024-05-02 PROCEDURE — 99213 OFFICE O/P EST LOW 20 MIN: CPT | Performed by: OBSTETRICS & GYNECOLOGY

## 2024-05-02 PROCEDURE — G0145 SCR C/V CYTO,THINLAYER,RESCR: HCPCS | Performed by: OBSTETRICS & GYNECOLOGY

## 2024-05-02 NOTE — PROGRESS NOTES
An is a 53 year old   here for follow up pap test.  Her last pap was   Lab Results   Component Value Date    PAP NIL 2021    .    Previous Treatment: cone, Barb 3 with negative margins    Current concerns include: none    All systems were reviewed and pertinent information is noted in subjective/HPI.    PMH: Her past medical, surgical, and obstetric histories were reviewed and are documented in their appropriate chart areas.    ALL/Meds: Her medication and allergy histories were reviewed and are documented in their appropriate chart areas    PE: LMP  (LMP Unknown)   EGBUS:  within normal limits  VAGINA:  normoestrogenic, well-supported, no unusual discharge  CERVIX:  smooth, non-friable, no gross lesions, Os stenotic, thin-layer PAP was taken    A/P   (D06.9) BARB III with severe dysplasia  (primary encounter diagnosis)  Comment:   Plan: Pap Screen with HPV - recommended age 30 - 65         years                1. Pap done. If normal, repeat pap in 1 year. If dysplasia has returned thinking about hysterectomy.

## 2024-05-08 LAB
BKR LAB AP GYN ADEQUACY: NORMAL
BKR LAB AP GYN INTERPRETATION: NORMAL
BKR LAB AP HPV REFLEX: NORMAL
BKR LAB AP PREVIOUS ABNL DX: NORMAL
BKR LAB AP PREVIOUS ABNORMAL: NORMAL
PATH REPORT.COMMENTS IMP SPEC: NORMAL
PATH REPORT.COMMENTS IMP SPEC: NORMAL
PATH REPORT.RELEVANT HX SPEC: NORMAL

## 2024-05-13 ENCOUNTER — TELEPHONE (OUTPATIENT)
Dept: OBGYN | Facility: CLINIC | Age: 54
End: 2024-05-13
Payer: COMMERCIAL

## 2024-05-13 ENCOUNTER — PATIENT OUTREACH (OUTPATIENT)
Dept: OBGYN | Facility: CLINIC | Age: 54
End: 2024-05-13
Payer: COMMERCIAL

## 2024-05-13 NOTE — TELEPHONE ENCOUNTER
Called and spoke with her regarding her Pap and HPV testing.  She expressed concerns and questions whether she should do a hysterectomy.  Explained our understanding of HPV and its association with cervical, but also vaginal/Vulvar dysplasia.  Explained that she doesn't appear to have any current dysplasia, post COLD KNIFE CONIZATION.  Explained that she will need Paps and monitoring regardless of whether she has a hysterectomy or not.    Reviewed the risks, benefits, and alternatives of hysterectomy including but not limited to bleeding, infection, injury to bowel,bladder and other structures.  The patient is aware that hysterectomy will render her sterile and unable to have further children.  We discussed the different routes of surgery including abdominal, vaginal, and laparoscopic and the possibility that the route of surgery may change during the procedure.  We discussed both total and supracervical hysterectomy and the benefits and contraindications involved.  We discussed ovarian sparing as well as oophrectomy. Reviewed pre and post op course. Patient was given the opportunity to ask questions and have them answered. The patient is unsure, but leaning toward hysterectomy.  Discussed salpingectomy and possible oophrectomy at the time of the surgery.  She will discuss it with her family and let us know her decision.  If she plans to proceed, would plan a TOTAL LAPAROSCOPIC HYSTERECTOMY BILATERAL SALPINGECTOMY possible BILATERAL SALPINGO-OOPHRECTOMY.

## 2024-07-11 ENCOUNTER — OFFICE VISIT (OUTPATIENT)
Dept: DERMATOLOGY | Facility: CLINIC | Age: 54
End: 2024-07-11
Payer: COMMERCIAL

## 2024-07-11 DIAGNOSIS — L82.0 INFLAMED SEBORRHEIC KERATOSIS: ICD-10-CM

## 2024-07-11 DIAGNOSIS — L82.1 SEBORRHEIC KERATOSES: ICD-10-CM

## 2024-07-11 DIAGNOSIS — D18.01 CHERRY ANGIOMA: ICD-10-CM

## 2024-07-11 DIAGNOSIS — L72.0 EIC (EPIDERMAL INCLUSION CYST): ICD-10-CM

## 2024-07-11 DIAGNOSIS — D22.9 MULTIPLE BENIGN MELANOCYTIC NEVI: ICD-10-CM

## 2024-07-11 DIAGNOSIS — L81.4 SOLAR LENTIGO: ICD-10-CM

## 2024-07-11 DIAGNOSIS — C44.519 BASAL CELL CARCINOMA (BCC) OF CHEST: Primary | ICD-10-CM

## 2024-07-11 PROCEDURE — 99213 OFFICE O/P EST LOW 20 MIN: CPT | Mod: 25 | Performed by: STUDENT IN AN ORGANIZED HEALTH CARE EDUCATION/TRAINING PROGRAM

## 2024-07-11 PROCEDURE — 17110 DESTRUCTION B9 LES UP TO 14: CPT | Mod: GC | Performed by: STUDENT IN AN ORGANIZED HEALTH CARE EDUCATION/TRAINING PROGRAM

## 2024-07-11 ASSESSMENT — PAIN SCALES - GENERAL: PAINLEVEL: NO PAIN (0)

## 2024-07-11 NOTE — PATIENT INSTRUCTIONS
Follow-up in 6 months For a full body skin check  Amlactin cream for the feet for the rough spots  - we did some freezing on the neck here are instructions for caring below      Cryotherapy Instructions     For the areas treated with liquid nitrogen (cryotherapy or freezing) today, they are expected to get pink, puffy, and perhaps even blister. The area should then crust up and fall off and the goal is to have nice new skin underneath. There is nothing special that you need to do for these areas. You can wash them as you do normal skin.     Sometimes a blister develops; if a blister does develop do NOT pop it. However, if it breaks open on its own, be sure to wash it with soap and water daily and put plain vasaline or petroleum jelly and a bandaid on it until the skin is healed.     Please call the clinic if you have any questions or concerns.        Checking for Skin Cancer  You can find cancer early by checking your skin each month. There are 3 kinds of skin cancer. They are melanoma, basal cell carcinoma, and squamous cell carcinoma. Doing monthly skin checks is the best way to find new marks or skin changes. Follow the instructions below for checking your skin.     The ABCDEs of checking moles for melanoma  Check your moles or growths for signs of melanoma using ABCDE:  Asymmetry: the sides of the mole or growth don t match  Border: the edges are ragged, notched, or blurred  Color: the color within the mole or growth varies  Diameter: the mole or growth is larger than 6 mm (size of a pencil eraser)  Evolving: the size, shape, or color of the mole or growth is changing       Checking for other types of skin cancer  Basal cell carcinoma or squamous cell carcinoma have symptoms such as:  A spot or mole that looks different from all other marks on your skin  Changes in how an area feels, such as itching, tenderness, or pain  Changes in the skin's surface, such as oozing, bleeding, or scaliness  A sore that does not  heal  New swelling or redness beyond the border of a mole     Who s at risk?  Anyone can get skin cancer. But you are at greater risk if you have:  Fair skin, light-colored hair, or light-colored eyes  Many moles or abnormal moles on your skin  A history of sunburns from sunlight or tanning beds  A family history of skin cancer  A history of exposure to radiation or chemicals  A weakened immune system  If you have had skin cancer in the past, you are at risk for recurring skin cancer.     How to check your skin  Do your monthly skin checkups in front of a full-length mirror. Check all parts of your body, including your:  Head (ears, face, neck, and scalp)  Torso (front, back, and sides)  Arms (tops, undersides, upper, and lower armpits)  Hands (palms, backs, and fingers, including under the nails)  Buttocks and genitals  Legs (front, back, and sides)  Feet (tops, soles, toes, including under the nails, and between toes)  If you have a lot of moles, take digital photos of them each month. Make sure to take photos both up close and from a distance. These can help you see if any moles change over time.  Most skin changes are not cancer. But if you see any changes in your skin, call your doctor right away. Only he or she can diagnose a problem. If you have skin cancer, seeing your doctor can be the first step toward getting the treatment that could save your life.     Use sunscreen of SPF 30 or greater. Apply liberally.  Relaxing in the sun may feel good. But it isn t good for your skin. In fact, the sun s harmful rays are the major cause of skin cancer. This is a serious disease that can be life-threatening. People of all ages, races, and backgrounds are at risk.  Skin cancer is the most common cancer in the [place] But in most cases, it can be prevented.     Your role in prevention  You can act today to help prevent skin cancer. Start by avoiding the sun s UV (ultraviolet) rays. And don t use tanning beds or lamps. They  "are no safer than the sun. Taking these steps can help keep you from getting skin cancer. It can also help prevent wrinkles and other aging effects caused by the sun. Make sure your children also follow these safeguards. Now is the time to start taking steps to prevent skin cancer.     When you are outdoors  Protect your skin when you go out during the day. Take safety steps whenever you go out to eat, run errands by car or on foot, or do any outdoor activity. There isn t just one easy way to protect your skin. It s best to follow all of these steps:  Wear tightly woven clothing that covers your skin. Put on a wide-brimmed hat to protect your face, ears, and scalp.  Watch the clock. Try to stay out of the sun between 10 a.m. and 4 p.m. That's when the sun's rays are strongest.  Head for the shade or create your own. Use an umbrella when sitting or strolling.  Know that the sun s rays can reflect off sand, water, and snow. This can harm your skin. Take extra care when you are near reflective surfaces.  Keep in mind that even when the weather is hazy or cloudy, your skin can be exposed to strong UV rays.  Shield your skin with sunscreen. Also use sunscreen on your children s skin. Keep babies younger than 6 months old out of the sun.     Tips for using sunscreen  To help prevent skin cancer, choose the right sunscreen and use it correctly. Try these tips:  Choose a sunscreen that has an SPF (sun protection factor) of at least 30. Also choose a sunscreen labeled \"broad spectrum.  This will protect you from both UVA and UVB (ultraviolet A and B) rays.  If one brand irritates your skin, try another, such as one without fragrance.  Use a water-resistant sunscreen if you swim or sweat.  Use at least 1 ounce of sunscreen to cover exposed areas. This is enough to fill a shot glass. You might need to adjust the amount depending on your body size.  Put the sunscreen on dry skin about 15 minutes before going outdoors. This " gives it time to soak in.  Reapply sunscreen every 2 hours. If you re active, do this more often.  Cover any sun-exposed skin, from your face to your feet. Don t forget your scalp, ears, and lips.  Know that while sunscreen helps protect you, it isn t enough. Sunscreens extend the length of time you can be outdoors before your skin starts to get red. But they don't give you total protection. Using sunscreen doesn't mean you can stay out in the sun for an unlimited time. Your skin cells are still being damaged. You should also wear protective clothing. And try to stay out of the sun as much as you can, especially from 10 a.m. to 4 p.m.

## 2024-07-11 NOTE — LETTER
7/11/2024       RE: An Cornejo  [address]  [place] [address]     Dear Colleague,    Thank you for referring your patient, An Cornejo, to the [facility] [place] [facility]. Please see a copy of my visit note below.    I have personally examined this patient and agree with the resident doctor's documentation and plan of care. I have reviewed and amended the resident's note. The documentation accurately reflects my clinical observations, diagnoses, treatment and follow-up plans.     Mendez Harvey MD  Dermatology Staff      [facility] Dermatology Note  Encounter Date: Jul 11, 2024  Office Visit     Dermatology Problem List:  FBSE 7/11/24 q6months x 2 years 2026    #. BCC, r upper chest   - extending to radial margin s/p excision 2/2024 ([facility])  # SK's neck  - LN2 7/11/24  #EIC, beneath right breast  ____________________________________________    Assessment & Plan:     # hx NMSC.  - NERD today  - Sun protection: Counseled SPF30+ sunscreen, UPF clothing, sun avoidance, tanning bed avoidance.  - ABCDEs: Counseled ABCDEs of melanoma: Asymmetry, Border (irregularity), Color (not uniform, changes in color), Diameter (greater than 6 mm which is about the size of a pencil eraser), and Evolving (any changes in preexisting moles).    # Seborrheic Keratosis's, Neck  Patient says she knicks them often and they are not comfortable and would like them treated.  - LN2 to 4 areas on the neck    #EIC, beneath right breast  - discussed risks and benefits of surgery   - risk factors include none  - pt wishes to proceed        Procedures Performed:   - Cryotherapy procedure note, location(s): anterior neck. After verbal consent and discussion of risks and benefits including, but not limited to, dyspigmentation/scar, blister, and pain, 4 lesion(s) was(were) treated with 1-2 mm freeze border for 1-2 cycles with liquid  "nitrogen. Post cryotherapy instructions were provided.    Follow-up: 6 month(s) in-person, or earlier for new or changing lesions    Staff and Resident:       Jefe Sharma DO, MS  PGY-3  Dermatology  [facility]  07/11/2024 3:32 PM    ____________________________________________    CC: Derm Problem (FBSE; spot on neck and under breast that she has noticed; also reports getting a \"heat rash\" when outside )    HPI:  Ms. An Cornejo is a(n) 53 year old female who presents today as a new patient for FBSE.  Had BCC that was removed in dermatology surgery here for her FBSE (first one). H/o skin cancer in the family but no one with melanoma to her knowledge. Is a red head and has had many sunburns.     Patient is otherwise feeling well, without additional skin concerns.    Labs Reviewed:  N/A    Physical Exam:  Vitals: LMP  (LMP Unknown)   SKIN: Total skin excluding the undergarment areas was performed. The exam included the head/face, neck, both arms, chest, back, abdomen, both legs, digits and/or nails.   - There is a dome shaped bright red papule on the trunk.   There is a raised dome shaped 4 mm nodule with a central punctum located on right under breast.  Multiple regular brown pigmented macules and papules are identified on the trunk and extremities.   There are fine lines and dyspigmentation on sun exposed areas of the face and chest.  There is a waxy stuck on tan to brown papule on the neck.   - No other lesions of concern on areas examined.     Medications:  Current Outpatient Medications   Medication Sig Dispense Refill     albuterol (PROAIR HFA/PROVENTIL HFA/VENTOLIN HFA) 108 (90 Base) MCG/ACT inhaler Inhale 2 puffs into the lungs every 4 hours as needed for shortness of breath 18 g 3     ALPRAZolam (XANAX) 0.5 MG tablet Take 1 tablet (0.5 mg) by mouth nightly as needed for sleep 90 tablet 1     rizatriptan (MAXALT) 10 MG tablet Take 1 tablet (10 mg) by mouth at onset of headache for " "migraine May repeat in 2 hours. Max 3 tablets/24 hours. 18 tablet 1     No current facility-administered medications for this visit.      Past Medical History:   Patient Active Problem List   Diagnosis     CARDIOVASCULAR SCREENING; LDL GOAL LESS THAN 160     Anxiety     Mild persistent asthma     Asthma, exercise induced     Exposure to pertussis     Rectocele     KATIE (stress urinary incontinence, female)     Cervical high risk HPV (human papillomavirus) test positive     Other irritable bowel syndrome     S/P LEEP     BARB III with severe dysplasia     Past Medical History:   Diagnosis Date     Abnormal Pap smear of cervix 2016, 2017, 12/18/18    See problem list     Anxiety     Use medication as needed     Asthma     Uses inhaler as needed     Cervical high risk HPV (human papillomavirus) test positive 2016 2017, 2018, 2020, 2021     Episodic tension type headache     Started when she was in her 30's intermittent     IUD     Mirena inserted 6/5/13     Moderate dysplasia of cervix 2019    see problem list     Neck pain     Both side of neck     Seizures (H) infancy    \"grew out of it\"     Status post colposcopy 11/28/2016    ECC - negative     Upper back pain     Over 5 years or more       CC Referred Self, MD  No address on file on close of this encounter.      Again, thank you for allowing me to participate in the care of your patient.      Sincerely,    Mendez Harvey MD    "

## 2024-07-11 NOTE — NURSING NOTE
"Chief Complaint   Patient presents with    Derm Problem     FBSE; spot on neck and under breast that she has noticed; also reports getting a \"heat rash\" when outside    Angely Sr RN    "

## 2024-07-11 NOTE — PROGRESS NOTES
I have personally examined this patient and agree with the resident doctor's documentation and plan of care. I have reviewed and amended the resident's note. The documentation accurately reflects my clinical observations, diagnoses, treatment and follow-up plans.     Mendez Harvey MD  Dermatology Staff      [facility] Dermatology Note  Encounter Date: Jul 11, 2024  Office Visit     Dermatology Problem List:  FBSE 7/11/24 q6months x 2 years 2026    #. BCC, r upper chest   - extending to radial margin s/p excision 2/2024 ([facility])  # SK's neck  - LN2 7/11/24  #EIC, beneath right breast  ____________________________________________    Assessment & Plan:     # hx NMSC.  - NERD today  - Sun protection: Counseled SPF30+ sunscreen, UPF clothing, sun avoidance, tanning bed avoidance.  - ABCDEs: Counseled ABCDEs of melanoma: Asymmetry, Border (irregularity), Color (not uniform, changes in color), Diameter (greater than 6 mm which is about the size of a pencil eraser), and Evolving (any changes in preexisting moles).    # iSeborrheic Keratosis's, Neck  Patient says she knicks them often and they are not comfortable and would like them treated.  - LN2 to 4 areas on the neck    #EIC, beneath right breast  - discussed risks and benefits of surgery   - risk factors include none  - pt wishes to proceed        Procedures Performed:   - Cryotherapy procedure note, location(s): anterior neck. After verbal consent and discussion of risks and benefits including, but not limited to, dyspigmentation/scar, blister, and pain, 4 lesion(s) was(were) treated with 1-2 mm freeze border for 1-2 cycles with liquid nitrogen. Post cryotherapy instructions were provided.    Follow-up: 6 month(s) in-person, or earlier for new or changing lesions    Staff and Resident:       Jefe Sharma DO, MS  PGY-3  Dermatology  [facility]  07/11/2024 3:32 PM    ____________________________________________    CC: Derm Problem  "(FBSE; spot on neck and under breast that she has noticed; also reports getting a \"heat rash\" when outside )    HPI:  Ms. An Cornejo is a(n) 53 year old female who presents today as a new patient for FBSE.  Had BCC that was removed in dermatology surgery here for her FBSE (first one). H/o skin cancer in the family but no one with melanoma to her knowledge. Is a red head and has had many sunburns.     Patient is otherwise feeling well, without additional skin concerns.    Labs Reviewed:  N/A    Physical Exam:  Vitals: LMP  (LMP Unknown)   SKIN: Total skin excluding the undergarment areas was performed. The exam included the head/face, neck, both arms, chest, back, abdomen, both legs, digits and/or nails.   - There is a dome shaped bright red papule on the trunk.   There is a raised dome shaped 4 mm nodule with a central punctum located on right under breast.  Multiple regular brown pigmented macules and papules are identified on the trunk and extremities.   There are fine lines and dyspigmentation on sun exposed areas of the face and chest.  There is a waxy stuck on tan to brown papule on the neck.   - No other lesions of concern on areas examined.     Medications:  Current Outpatient Medications   Medication Sig Dispense Refill    albuterol (PROAIR HFA/PROVENTIL HFA/VENTOLIN HFA) 108 (90 Base) MCG/ACT inhaler Inhale 2 puffs into the lungs every 4 hours as needed for shortness of breath 18 g 3    ALPRAZolam (XANAX) 0.5 MG tablet Take 1 tablet (0.5 mg) by mouth nightly as needed for sleep 90 tablet 1    rizatriptan (MAXALT) 10 MG tablet Take 1 tablet (10 mg) by mouth at onset of headache for migraine May repeat in 2 hours. Max 3 tablets/24 hours. 18 tablet 1     No current facility-administered medications for this visit.      Past Medical History:   Patient Active Problem List   Diagnosis    CARDIOVASCULAR SCREENING; LDL GOAL LESS THAN 160    Anxiety    Mild persistent asthma    Asthma, exercise induced    " "Exposure to pertussis    Rectocele    KATIE (stress urinary incontinence, female)    Cervical high risk HPV (human papillomavirus) test positive    Other irritable bowel syndrome    S/P LEEP    BARB III with severe dysplasia     Past Medical History:   Diagnosis Date    Abnormal Pap smear of cervix 2016, 2017, 12/18/18    See problem list    Anxiety     Use medication as needed    Asthma     Uses inhaler as needed    Cervical high risk HPV (human papillomavirus) test positive 2016 2017, 2018, 2020, 2021    Episodic tension type headache     Started when she was in her 30's intermittent    IUD     Mirena inserted 6/5/13    Moderate dysplasia of cervix 2019    see problem list    Neck pain     Both side of neck    Seizures (H) infancy    \"grew out of it\"    Status post colposcopy 11/28/2016    ECC - negative    Upper back pain     Over 5 years or more       CC Referred Self, MD  No address on file on close of this encounter.    "

## 2024-07-11 NOTE — NURSING NOTE
Chief Complaint   Patient presents with    Derm Problem     FBSE; spot on neck and under breast that she has noticed      Angely Sr RN

## 2024-07-15 ENCOUNTER — NURSE TRIAGE (OUTPATIENT)
Dept: FAMILY MEDICINE | Facility: CLINIC | Age: 54
End: 2024-07-15
Payer: COMMERCIAL

## 2024-07-15 NOTE — TELEPHONE ENCOUNTER
"Provider: What time frame does the patient need assessment for her symptoms and where?  The protocol does not address breast implants and I do not have much background assessing them.  Thank you. Vidya Kraus R.N.    Nurse Triage SBAR    Is this a 2nd Level Triage? YES, LICENSED PRACTITIONER REVIEW IS REQUIRED    Situation:   On Friday evening when she was weeding her garden, she felt something in the top of her left breast pop. It wasn't comfortable, but it wasn't painful either. Her first thought was that it was my implant.    Background:   The pain is getting worse not better  It hurts to laugh or take a deep breath in.   She has had her implants for years.   Last mammo was fine.  The implant seems to be the shape it is usually.  6.   Denies: fever, redness, swelling     Assessment: She needs assessment.     Protocol Recommended Disposition:   See in Office Within 2 Weeks    Recommendation: What time frame does the patient need assessment for her symptoms and where?  The protocol does not address breast implants and I do not have much background assessing them.      Routed to provider    Does the patient meet one of the following criteria for ADS visit consideration? 16+ years old, with an [facility] PCP     TIP  Providers, please consider if this condition is appropriate for management at one of our Acute and Diagnostic Services sites.     If patient is a good candidate, please use dotphrase <dot>triageresponse and select Refer to ADS to document.      This encounter is being created due to the Evoleen message dated 7/15/2024 as written below:       Patient reports that she is having left breast pain. Please see above note of possible cause for her symptoms. \"On Friday evening when I was weeding my garden, I felt something in the top of my left breast pop. It wasn't comfortable, but it wasn't painful either. My first thought was that it was my implant. I'd been squatting down trying to pull out a weed tree. It was " "really stuck and my knew was braced against my body. The pop happened at exactly the same time that I pulled. It started hurting about a half hour later, and a lot more by bedtime. It woke my from my sleep several times that night.\"     I assumed that it was a pulled muscle and would get better after a few days, but the soreness is only getting worse (three) days later. Now it hurts when I take a deep breath.\"    The pain is reported to be small pain cannot communicate using 0-10 factor).  She thought the pain was going to get better, but is just got worse. It hurts to laugh or take a deep breath in.  She has had her implants for years.  Last mammo was fine.  The implant seems to be the shape it is usually.    [place] may be able to make an appointment Wednesday if necessary. She is leaving out of town for work this week.     Denies: fever, redness, swelling       "

## 2024-07-15 NOTE — TELEPHONE ENCOUNTER
Pt calling back, see messages below.     Pt would like a call from clinic to help her get scheduled within timeframe recommended by PA Kristen Kehr.

## 2024-07-15 NOTE — TELEPHONE ENCOUNTER
"Reason for Disposition    Breast pain and cause is not known    Additional Information    Negative: SEVERE breast pain and fever > 103 F  (39.4 C)    Negative: Patient sounds very sick or weak to the triager    Negative: Breast looks infected (spreading redness, feels hot or painful to touch) and fever    Negative: Breast looks infected (spreading redness, feels hot or painful to touch) and no fever    Negative: Painful rash and multiple small blisters grouped together (i.e., dermatomal distribution or \"band\" or \"stripe\")    Negative: Cuts, burns, or bruises of breasts and suspicious history for the injury    Negative: Breast lump    Negative: Nipple discharge and bloody    Negative: Nipple is inverted (i.e., points inward)  (Exception: Long-term physical characteristic, present for many years.)    Negative: Dry flaking-peeling skin of nipple    Negative: Change in shape or appearance of breast    Negative: Dimpling of skin of breast (i.e., skin looks like the outside of an orange peel)    Negative: Patient wants to be seen    Negative: Breast tenderness and fullness and pregnant    Negative: Nipple discharge and not bloody (e.g., clear, white, yellow, brown, green)    Negative: Breast pain or tenderness that occurs monthly before menstrual period, and has NOT been evaluated by a doctor (or NP/PA)    Protocols used: Breast Symptoms-A-[place]    "

## 2024-07-15 NOTE — TELEPHONE ENCOUNTER
Called patient and left message for patient to return call.     RN please give provider message as written below. Patient may need assistance in scheduling follow up appointment.    Michelle Owusu RN

## 2024-07-16 NOTE — TELEPHONE ENCOUNTER
Called pt to schedule appointment with provider. Appointment 7/17/24 @ 0655. No further questions at this time.     Thank you - Belle Cummings, JOSEFINAN, RN

## 2024-07-17 ENCOUNTER — OFFICE VISIT (OUTPATIENT)
Dept: FAMILY MEDICINE | Facility: CLINIC | Age: 54
End: 2024-07-17
Payer: COMMERCIAL

## 2024-07-17 VITALS
BODY MASS INDEX: 22.6 KG/M2 | HEIGHT: 67 IN | TEMPERATURE: 97.7 F | DIASTOLIC BLOOD PRESSURE: 63 MMHG | OXYGEN SATURATION: 100 % | WEIGHT: 144 LBS | RESPIRATION RATE: 20 BRPM | HEART RATE: 70 BPM | SYSTOLIC BLOOD PRESSURE: 96 MMHG

## 2024-07-17 DIAGNOSIS — R07.89 CHEST WALL PAIN: Primary | ICD-10-CM

## 2024-07-17 PROCEDURE — G2211 COMPLEX E/M VISIT ADD ON: HCPCS | Performed by: FAMILY MEDICINE

## 2024-07-17 PROCEDURE — 99213 OFFICE O/P EST LOW 20 MIN: CPT | Mod: 24 | Performed by: FAMILY MEDICINE

## 2024-07-17 ASSESSMENT — ASTHMA QUESTIONNAIRES
ACT_TOTALSCORE: 21
QUESTION_4 LAST FOUR WEEKS HOW OFTEN HAVE YOU USED YOUR RESCUE INHALER OR NEBULIZER MEDICATION (SUCH AS ALBUTEROL): NOT AT ALL
QUESTION_1 LAST FOUR WEEKS HOW MUCH OF THE TIME DID YOUR ASTHMA KEEP YOU FROM GETTING AS MUCH DONE AT WORK, SCHOOL OR AT HOME: NONE OF THE TIME
QUESTION_5 LAST FOUR WEEKS HOW WOULD YOU RATE YOUR ASTHMA CONTROL: NOT CONTROLLED AT ALL
ACT_TOTALSCORE: 21
QUESTION_2 LAST FOUR WEEKS HOW OFTEN HAVE YOU HAD SHORTNESS OF BREATH: NOT AT ALL
QUESTION_3 LAST FOUR WEEKS HOW OFTEN DID YOUR ASTHMA SYMPTOMS (WHEEZING, COUGHING, SHORTNESS OF BREATH, CHEST TIGHTNESS OR PAIN) WAKE YOU UP AT NIGHT OR EARLIER THAN USUAL IN THE MORNING: NOT AT ALL

## 2024-07-17 ASSESSMENT — PAIN SCALES - GENERAL: PAINLEVEL: MILD PAIN (2)

## 2024-07-17 NOTE — PROGRESS NOTES
Assessment & Plan     (R07.89) Chest wall pain  (primary encounter diagnosis)  Comment: suspect costochondritis  Plan: continue to monitor, ok to ice, use nsaids and if no improvement, consider ultrasound to assess implant.    The longitudinal plan of care for the diagnosis(es)/condition(s) as documented were addressed during this visit. Due to the added complexity in care, I will continue to support An in the subsequent management and with ongoing continuity of care.    See Patient Instructions    Subjective   An is a 53 year old, presenting for the following health issues:  Breast Pain (Left, has implants, was weeding and felt a pop Friday night, taking breaths hurts)      7/17/2024     6:57 AM   Additional Questions   Roomed by maribel   Accompanied by self         7/17/2024     6:57 AM   Patient Reported Additional Medications   Patient reports taking the following new medications see chart     History of Present Illness       Reason for visit:  Breast pain  Symptom onset:  3-7 days ago  Symptoms include:  Pain  Symptom intensity:  Mild  Symptom progression:  Improving  Had these symptoms before:  No  What makes it worse:  Moving  What makes it better:  Nothing    She eats 2-3 servings of fruits and vegetables daily.She consumes 0 sweetened beverage(s) daily.She exercises with enough effort to increase her heart rate 30 to 60 minutes per day.  She exercises with enough effort to increase her heart rate 4 days per week.   She is taking medications regularly.  Today reports sudden onset of left upper chest and mid chest pain.  It started 4 days ago after she had been pulling weeds there was 1 particular meat that was very difficult recurrent requiring her to use both hands and straining she felt a pop but no pain at the time.  Later that evening pain started along the left side of her sternum and into her breast.  She has breast implants that are silicone and was concerned that she may have ruptured a  "silicone implant.  She does not report any change in shape of the implants or her breast there is been no swelling and no redness.    Her pain continued to worsen up until yesterday and now has started to improve.  She notes it is worsened with a deep breath or certain movements of her arm.  It does radiate from her sternum through her right breast to her side.  No fevers or chills there is no shortness of breath no palpitations.  Pain is mainly associated with movement.              Review of Systems  Constitutional, HEENT, cardiovascular, pulmonary, gi and gu systems are negative, except as otherwise noted.      Objective    BP 96/63   Pulse 70   Temp 97.7  F (36.5  C) (Tympanic)   Resp 20   Ht 1.702 m (5' 7\")   Wt 65.3 kg (144 lb)   LMP  (LMP Unknown)   SpO2 100%   BMI 22.55 kg/m    Body mass index is 22.55 kg/m .  Physical Exam   GENERAL: alert and no distress  EYES: Eyes grossly normal to inspection, PERRL and conjunctivae and sclerae normal  RESP: lungs clear to auscultation - no rales, rhonchi or wheezes  CHEST: mild ttp over left sternocostal area from rib 3-6, no ttp of axilla.  BREAST: normal without masses, tenderness or nipple discharge and no palpable axillary masses or adenopathy, implants palpable and no change in shape.  CV: regular rate and rhythm, normal S1 S2, no S3 or S4, no murmur, click or rub, no peripheral edema   MS: no gross musculoskeletal defects noted, no edema  SKIN: no suspicious lesions or rashes  PSYCH: mentation appears normal, affect normal/bright            Signed Electronically by: Melania Vega MD    "

## 2024-07-18 ENCOUNTER — TELEPHONE (OUTPATIENT)
Dept: DERMATOLOGY | Facility: CLINIC | Age: 54
End: 2024-07-18
Payer: COMMERCIAL

## 2024-07-22 ENCOUNTER — TELEPHONE (OUTPATIENT)
Dept: DERMATOLOGY | Facility: CLINIC | Age: 54
End: 2024-07-22
Payer: COMMERCIAL

## 2024-11-21 ENCOUNTER — TRANSFERRED RECORDS (OUTPATIENT)
Dept: HEALTH INFORMATION MANAGEMENT | Facility: CLINIC | Age: 54
End: 2024-11-21
Payer: COMMERCIAL

## 2025-01-11 ENCOUNTER — HEALTH MAINTENANCE LETTER (OUTPATIENT)
Age: 55
End: 2025-01-11

## 2025-05-06 NOTE — TELEPHONE ENCOUNTER
Attempted to reach pt. There was no answer. LM to return call to RN at [phone].  Shauna Worley, JOSEFINAN, RN    
Could be manifestation of asthma too, need video visit.       
RN contacted pt. Pt states she completed [facility] visit 4/16/20 and states sx persist.   Pt aware sx are consistent with possible COVID-19 and has been avoiding contact with others.   Pt denies new or worsening sx since 4/16/20 [facility] visit.  Pt states some days are better, some days and then worse. Pt is speaking in full, clear, uninterrupted sentences. Pt states she feels like she can't get a full breath in. Nagging cough sounds dry and pt states coughing is primarily triggered by speaking.    Pt notified of provider plan below that needs video visit. Pt scheduled for Video Visit at 1:20pm today with PCP. Pt indicates understanding of issues and agrees with the plan.  JOSEFINA BrandtN, RN    
Reason for Call:  Other call back    Detailed comments: patient is calling stating has dry chest cough for over a month, did [facility], states regardless of what has done cannot get cough to go away and would like some suggestions. Patient declined to do evisit, on[facility], or schedule virtual appts. States just wants advice from provider. Please call to advise. Thank  You.    Phone Number Patient can be reached at: Home number on file [phone] (home)    Best Time:     Can we leave a detailed message on this number? YES    Call taken on 5/1/2020 at 9:33 AM by Dina Anderson      
Routed to provider to review and advise. See [facility] Visit 4/16/20.   Since sx were consistent with possible COVID-19 on 4/16/20 would you like pt to submit another [facility] visit today, or would you like pt to schedule a Virtual Visit with you today?    Shauna Worley, JOSEFINAN, RN    
2-/5

## 2025-08-19 ENCOUNTER — PATIENT OUTREACH (OUTPATIENT)
Dept: CARE COORDINATION | Facility: CLINIC | Age: 55
End: 2025-08-19
Payer: COMMERCIAL

## (undated) DEVICE — BLADE KNIFE BEAVER CERVICAL  379100

## (undated) DEVICE — SU SILK 2-0 SH 30" K833H

## (undated) DEVICE — ESU GROUND PAD ADULT W/CORD E7507

## (undated) DEVICE — NDL SPINAL 22GA 3.5" QUINCKE 405181

## (undated) DEVICE — SUCTION CANISTER MEDIVAC LINER 1500ML W/LID 65651-515

## (undated) DEVICE — ESU SUCTION CAUTERY 10FR FOOT CONTROL E3310

## (undated) DEVICE — PAD PERI W/WINGS 1580A

## (undated) DEVICE — GLOVE BIOGEL PI MICRO SZ 7.5 48575

## (undated) DEVICE — NDL 19GA 1.5"

## (undated) DEVICE — SU VICRYL 0 CT-1 27" J340H

## (undated) DEVICE — DRAPE GYN/UROLOGY FLUID POUCH TUR 29455

## (undated) DEVICE — PACK MINOR SBA15MIFSE

## (undated) DEVICE — SWAB PROCTO 16" NON-STERILE

## (undated) DEVICE — SOL WATER IRRIG 1000ML BOTTLE 07139-09

## (undated) RX ORDER — ACETAMINOPHEN 325 MG/1
TABLET ORAL
Status: DISPENSED
Start: 2023-12-26

## (undated) RX ORDER — CEFAZOLIN SODIUM 1 G/3ML
INJECTION, POWDER, FOR SOLUTION INTRAMUSCULAR; INTRAVENOUS
Status: DISPENSED
Start: 2023-12-26

## (undated) RX ORDER — FENTANYL CITRATE 50 UG/ML
INJECTION, SOLUTION INTRAMUSCULAR; INTRAVENOUS
Status: DISPENSED
Start: 2023-12-26